# Patient Record
Sex: MALE | Race: WHITE | NOT HISPANIC OR LATINO
[De-identification: names, ages, dates, MRNs, and addresses within clinical notes are randomized per-mention and may not be internally consistent; named-entity substitution may affect disease eponyms.]

---

## 2018-09-04 ENCOUNTER — APPOINTMENT (OUTPATIENT)
Dept: NEUROLOGY | Facility: CLINIC | Age: 43
End: 2018-09-04

## 2018-09-04 VITALS
BODY MASS INDEX: 40.16 KG/M2 | SYSTOLIC BLOOD PRESSURE: 122 MMHG | DIASTOLIC BLOOD PRESSURE: 87 MMHG | WEIGHT: 265 LBS | HEART RATE: 87 BPM | HEIGHT: 68 IN

## 2018-10-23 ENCOUNTER — EMERGENCY (EMERGENCY)
Facility: HOSPITAL | Age: 43
LOS: 1 days | Discharge: HOME | End: 2018-10-23
Attending: EMERGENCY MEDICINE
Payer: MEDICARE

## 2018-10-23 VITALS
RESPIRATION RATE: 18 BRPM | WEIGHT: 235.01 LBS | HEART RATE: 75 BPM | SYSTOLIC BLOOD PRESSURE: 142 MMHG | OXYGEN SATURATION: 98 % | TEMPERATURE: 98 F | HEIGHT: 68 IN | DIASTOLIC BLOOD PRESSURE: 91 MMHG

## 2018-10-23 DIAGNOSIS — Z98.89 OTHER SPECIFIED POSTPROCEDURAL STATES: Chronic | ICD-10-CM

## 2018-10-23 DIAGNOSIS — Z96.7 PRESENCE OF OTHER BONE AND TENDON IMPLANTS: Chronic | ICD-10-CM

## 2018-10-23 LAB
ALBUMIN SERPL ELPH-MCNC: 4.5 G/DL — SIGNIFICANT CHANGE UP (ref 3.3–5)
ALP SERPL-CCNC: 86 U/L — SIGNIFICANT CHANGE UP (ref 40–120)
ALT FLD-CCNC: 27 U/L — SIGNIFICANT CHANGE UP (ref 10–45)
ANION GAP SERPL CALC-SCNC: 13 MMOL/L — SIGNIFICANT CHANGE UP (ref 5–17)
APPEARANCE UR: ABNORMAL
APTT BLD: 28.8 SEC — SIGNIFICANT CHANGE UP (ref 27.5–37.4)
AST SERPL-CCNC: 21 U/L — SIGNIFICANT CHANGE UP (ref 10–40)
BACTERIA # UR AUTO: ABNORMAL
BASOPHILS # BLD AUTO: 0 K/UL — SIGNIFICANT CHANGE UP (ref 0–0.2)
BASOPHILS NFR BLD AUTO: 0.4 % — SIGNIFICANT CHANGE UP (ref 0–2)
BILIRUB SERPL-MCNC: 0.7 MG/DL — SIGNIFICANT CHANGE UP (ref 0.2–1.2)
BILIRUB UR-MCNC: NEGATIVE — SIGNIFICANT CHANGE UP
BUN SERPL-MCNC: 15 MG/DL — SIGNIFICANT CHANGE UP (ref 7–23)
CALCIUM SERPL-MCNC: 10 MG/DL — SIGNIFICANT CHANGE UP (ref 8.4–10.5)
CHLORIDE SERPL-SCNC: 103 MMOL/L — SIGNIFICANT CHANGE UP (ref 96–108)
CO2 SERPL-SCNC: 24 MMOL/L — SIGNIFICANT CHANGE UP (ref 22–31)
COLOR SPEC: ABNORMAL
CREAT SERPL-MCNC: 1.31 MG/DL — HIGH (ref 0.5–1.3)
DIFF PNL FLD: ABNORMAL
EOSINOPHIL # BLD AUTO: 0 K/UL — SIGNIFICANT CHANGE UP (ref 0–0.5)
EOSINOPHIL NFR BLD AUTO: 0.4 % — SIGNIFICANT CHANGE UP (ref 0–6)
EPI CELLS # UR: 5 /HPF — SIGNIFICANT CHANGE UP
GLUCOSE SERPL-MCNC: 98 MG/DL — SIGNIFICANT CHANGE UP (ref 70–99)
GLUCOSE UR QL: NEGATIVE — SIGNIFICANT CHANGE UP
HCT VFR BLD CALC: 45.5 % — SIGNIFICANT CHANGE UP (ref 39–50)
HGB BLD-MCNC: 15.9 G/DL — SIGNIFICANT CHANGE UP (ref 13–17)
HYALINE CASTS # UR AUTO: 5 /LPF — HIGH (ref 0–2)
INR BLD: 1.02 RATIO — SIGNIFICANT CHANGE UP (ref 0.88–1.16)
KETONES UR-MCNC: NEGATIVE — SIGNIFICANT CHANGE UP
LEUKOCYTE ESTERASE UR-ACNC: ABNORMAL
LYMPHOCYTES # BLD AUTO: 2 K/UL — SIGNIFICANT CHANGE UP (ref 1–3.3)
LYMPHOCYTES # BLD AUTO: 24.8 % — SIGNIFICANT CHANGE UP (ref 13–44)
MCHC RBC-ENTMCNC: 29.7 PG — SIGNIFICANT CHANGE UP (ref 27–34)
MCHC RBC-ENTMCNC: 35 GM/DL — SIGNIFICANT CHANGE UP (ref 32–36)
MCV RBC AUTO: 85 FL — SIGNIFICANT CHANGE UP (ref 80–100)
MONOCYTES # BLD AUTO: 0.6 K/UL — SIGNIFICANT CHANGE UP (ref 0–0.9)
MONOCYTES NFR BLD AUTO: 7.1 % — SIGNIFICANT CHANGE UP (ref 2–14)
NEUTROPHILS # BLD AUTO: 5.5 K/UL — SIGNIFICANT CHANGE UP (ref 1.8–7.4)
NEUTROPHILS NFR BLD AUTO: 67.2 % — SIGNIFICANT CHANGE UP (ref 43–77)
NITRITE UR-MCNC: NEGATIVE — SIGNIFICANT CHANGE UP
PH UR: 5.5 — SIGNIFICANT CHANGE UP (ref 5–8)
PLATELET # BLD AUTO: 264 K/UL — SIGNIFICANT CHANGE UP (ref 150–400)
POTASSIUM SERPL-MCNC: 4 MMOL/L — SIGNIFICANT CHANGE UP (ref 3.5–5.3)
POTASSIUM SERPL-SCNC: 4 MMOL/L — SIGNIFICANT CHANGE UP (ref 3.5–5.3)
PROT SERPL-MCNC: 7.7 G/DL — SIGNIFICANT CHANGE UP (ref 6–8.3)
PROT UR-MCNC: ABNORMAL
PROTHROM AB SERPL-ACNC: 11 SEC — SIGNIFICANT CHANGE UP (ref 9.8–12.7)
RBC # BLD: 5.35 M/UL — SIGNIFICANT CHANGE UP (ref 4.2–5.8)
RBC # FLD: 13.2 % — SIGNIFICANT CHANGE UP (ref 10.3–14.5)
RBC CASTS # UR COMP ASSIST: 16 /HPF — HIGH (ref 0–4)
SODIUM SERPL-SCNC: 140 MMOL/L — SIGNIFICANT CHANGE UP (ref 135–145)
SP GR SPEC: 1.03 — HIGH (ref 1.01–1.02)
UROBILINOGEN FLD QL: NEGATIVE — SIGNIFICANT CHANGE UP
WBC # BLD: 8.1 K/UL — SIGNIFICANT CHANGE UP (ref 3.8–10.5)
WBC # FLD AUTO: 8.1 K/UL — SIGNIFICANT CHANGE UP (ref 3.8–10.5)
WBC UR QL: 11 /HPF — HIGH (ref 0–5)

## 2018-10-23 PROCEDURE — 99284 EMERGENCY DEPT VISIT MOD MDM: CPT | Mod: GC

## 2018-10-23 PROCEDURE — 74176 CT ABD & PELVIS W/O CONTRAST: CPT | Mod: 26

## 2018-10-23 RX ORDER — AZITHROMYCIN 500 MG/1
500 TABLET, FILM COATED ORAL ONCE
Qty: 0 | Refills: 0 | Status: DISCONTINUED | OUTPATIENT
Start: 2018-10-23 | End: 2018-10-23

## 2018-10-23 RX ORDER — CEFTRIAXONE 500 MG/1
1 INJECTION, POWDER, FOR SOLUTION INTRAMUSCULAR; INTRAVENOUS ONCE
Qty: 0 | Refills: 0 | Status: DISCONTINUED | OUTPATIENT
Start: 2018-10-23 | End: 2018-10-23

## 2018-10-23 RX ORDER — ACETAMINOPHEN 500 MG
1000 TABLET ORAL ONCE
Qty: 0 | Refills: 0 | Status: COMPLETED | OUTPATIENT
Start: 2018-10-23 | End: 2018-10-23

## 2018-10-23 RX ORDER — KETOROLAC TROMETHAMINE 30 MG/ML
15 SYRINGE (ML) INJECTION ONCE
Qty: 0 | Refills: 0 | Status: DISCONTINUED | OUTPATIENT
Start: 2018-10-23 | End: 2018-10-23

## 2018-10-23 RX ORDER — SODIUM CHLORIDE 9 MG/ML
1000 INJECTION INTRAMUSCULAR; INTRAVENOUS; SUBCUTANEOUS ONCE
Qty: 0 | Refills: 0 | Status: COMPLETED | OUTPATIENT
Start: 2018-10-23 | End: 2018-10-23

## 2018-10-23 RX ADMIN — Medication 15 MILLIGRAM(S): at 19:55

## 2018-10-23 RX ADMIN — SODIUM CHLORIDE 1000 MILLILITER(S): 9 INJECTION INTRAMUSCULAR; INTRAVENOUS; SUBCUTANEOUS at 19:55

## 2018-10-23 RX ADMIN — Medication 1000 MILLIGRAM(S): at 20:31

## 2018-10-23 RX ADMIN — Medication 400 MILLIGRAM(S): at 19:55

## 2018-10-23 RX ADMIN — Medication 15 MILLIGRAM(S): at 20:31

## 2018-10-23 RX ADMIN — SODIUM CHLORIDE 1000 MILLILITER(S): 9 INJECTION INTRAMUSCULAR; INTRAVENOUS; SUBCUTANEOUS at 22:04

## 2018-10-23 NOTE — ED PROVIDER NOTE - MEDICAL DECISION MAKING DETAILS
Attending Pedro Lyles DO: 42 yo male presents with right flank pain radiating to right groin x 1 day. Pain "11/10". No hx of kidney stones. No hematuria, dysuria or increased urinary frequency. No fevers. PE: Uncomfortable due to pain, right CVA tenderness, right lower quadrant ttp. A/P: likely kidney stone. Will obtain labs, fluids, analgesia, ct ab/pel, reevaluate.

## 2018-10-23 NOTE — ED PROVIDER NOTE - PLAN OF CARE
1) Please take tylenol and motrin for pain control and stay hydrated to prevent another kidney stone. Please follow-up with your primary care doctor within the next 3 days.  Please call today or tomorrow for an appointment.  If you cannot follow-up with your doctor(s), please return to the ED for any urgent issues.  2) If you have any worsening of symptoms or any other concerns please return to the ED immediately.  3) Please continue taking your home medications as directed.  4) You may have been given a copy of your labs and/or imaging.  Please go over these with your primary care doctor.

## 2018-10-23 NOTE — ED PROVIDER NOTE - PROGRESS NOTE DETAILS
NICKY Pennington MD : ct w perinephric stranding and no visualized stone - poss passed stone vs early pyelo. no overt uti on ua, cx pending. pt reassessed, states he is now havign some urinary burning. will tx w keflex - updated Dr Villarreal (family of pt). additional verbal instructions regarding diagnosis, return precautions and follow up plan given to pt and/or family. NICKY Pennington MD

## 2018-10-23 NOTE — ED PROVIDER NOTE - CARE PLAN
Principal Discharge DX:	Nephrolithiasis  Assessment and plan of treatment:	1) Please take tylenol and motrin for pain control and stay hydrated to prevent another kidney stone. Please follow-up with your primary care doctor within the next 3 days.  Please call today or tomorrow for an appointment.  If you cannot follow-up with your doctor(s), please return to the ED for any urgent issues.  2) If you have any worsening of symptoms or any other concerns please return to the ED immediately.  3) Please continue taking your home medications as directed.  4) You may have been given a copy of your labs and/or imaging.  Please go over these with your primary care doctor. Principal Discharge DX:	Nephrolithiasis  Assessment and plan of treatment:	1) Please take tylenol and motrin for pain control and stay hydrated to prevent another kidney stone. Please follow-up with your primary care doctor within the next 3 days.  Please call today or tomorrow for an appointment.  If you cannot follow-up with your doctor(s), please return to the ED for any urgent issues.  2) If you have any worsening of symptoms or any other concerns please return to the ED immediately.  3) Please continue taking your home medications as directed.  4) You may have been given a copy of your labs and/or imaging.  Please go over these with your primary care doctor.  Secondary Diagnosis:	Acute cystitis with hematuria

## 2018-10-23 NOTE — ED PROVIDER NOTE - NS ED ROS FT
Constitutional: no fevers, chills  HEENT: no visual changes, no sore throat, no rhinorrhea  CV: no cp  Resp: no sob  GI: +RLQ pain, no n/v/diarrhea/constipation  : no dysuria, hematuria  MSK: +R flank pain  skin: no rashes  neuro: no HA, no confusion

## 2018-10-23 NOTE — ED ADULT NURSE NOTE - OBJECTIVE STATEMENT
42 YO male c/o Right flank pain radiating to groin. Patient states his pain started this morning, describes the pain as sharp, 10/10, radiating to the groin area and LLQ of abdomen and had associated nausea and one episode of diarrhea. Patient took two Advil this morning with partial relief but came to the ED when pain returned. Patient denies fever or chills, denies blood in urine. Patient denies burning or pain upon urination. Positive CVA tenderness. Of note, patient states that he went to his cardiologist office and told him he had a abnormal EKG, patient does not recall what rhythm he was diagnosed with. Patient is A&OX3, no complaints of CP, HA or SOB. Airway patent, breathing unlabored. breath sounds clear and equal b/l. Abdomen soft non-tender, non-distended. Peripheral pulses positive b/l. Skin warm, dry and pink. Safety maintained, side rails up, call bell within reach.

## 2018-10-23 NOTE — ED ADULT TRIAGE NOTE - CHIEF COMPLAINT QUOTE
rt flank pain rad rt groin rad rle  nausea  Denies h/o kidney stones Denies blood in urine or diff urinating

## 2018-10-23 NOTE — ED PROVIDER NOTE - PHYSICAL EXAMINATION
Vitals: WNL  Gen: laying comfortably in NAD  Head: NCAT  ENT: sclerae white, anicterus, moist mucous membranes. No exudates. Neck supple, no LAD,  no carotid bruits, no JVD  CV: RRR. Audible S1 and S2. No murmurs, rubs, gallops, S3, nor S4, 2+ radial and DP pulses   Pulm: Clear to auscultation bilaterally. No wheezes, rales, or rhonchi  Abd: soft, normoactive BS x4, NTND, no rebound, no guarding, no rashes  Musculoskeletal:  No peripheral edema  Skin: no lesions or scars noted  Neurologic: AAOx3  : +L CVA tenderness, genital exam (chpaeroned by Milena Norman RN): no testicular tenderness, massess, no lesions/rashes, no hernias

## 2018-10-23 NOTE — ED ADULT NURSE NOTE - PSH
H/O lumbar discectomy    S/P ORIF (open reduction internal fixation) fracture  of the right ankle in Jan 2016

## 2018-10-23 NOTE — ED PROVIDER NOTE - OBJECTIVE STATEMENT
44yo M h/o chronic bl LBP pain L>R s/p fall years ago p/w R flank pain radiating to the RLQ and groin that started this AM. Pain worse with movemet, improved with using hot water on the R flank. Pain is constant. Denies dysuria, hematuria, f/c, n/v, bulges, h/o abd surgeries, h/o hernias, diarrhea/constipation, trauma to the region. Took flexeril and tylenol which didn't help.

## 2018-10-24 VITALS
DIASTOLIC BLOOD PRESSURE: 69 MMHG | OXYGEN SATURATION: 99 % | RESPIRATION RATE: 18 BRPM | SYSTOLIC BLOOD PRESSURE: 107 MMHG | HEART RATE: 66 BPM

## 2018-10-24 RX ORDER — OXYCODONE HYDROCHLORIDE 5 MG/1
1 TABLET ORAL
Qty: 0 | Refills: 0 | COMMUNITY
Start: 2018-10-24

## 2018-10-24 RX ORDER — CEPHALEXIN 500 MG
500 CAPSULE ORAL ONCE
Qty: 0 | Refills: 0 | Status: COMPLETED | OUTPATIENT
Start: 2018-10-24 | End: 2018-10-24

## 2018-10-24 RX ORDER — CEPHALEXIN 500 MG
1 CAPSULE ORAL
Qty: 14 | Refills: 0 | OUTPATIENT
Start: 2018-10-24 | End: 2018-10-30

## 2018-10-24 RX ADMIN — Medication 500 MILLIGRAM(S): at 01:32

## 2018-10-25 LAB
CULTURE RESULTS: NO GROWTH — SIGNIFICANT CHANGE UP
SPECIMEN SOURCE: SIGNIFICANT CHANGE UP

## 2018-10-26 ENCOUNTER — INPATIENT (INPATIENT)
Facility: HOSPITAL | Age: 43
LOS: 3 days | Discharge: HOME | DRG: 871 | End: 2018-10-30
Attending: INTERNAL MEDICINE | Admitting: INTERNAL MEDICINE
Payer: MEDICARE

## 2018-10-26 VITALS
OXYGEN SATURATION: 94 % | HEART RATE: 115 BPM | RESPIRATION RATE: 18 BRPM | SYSTOLIC BLOOD PRESSURE: 122 MMHG | TEMPERATURE: 103 F | DIASTOLIC BLOOD PRESSURE: 80 MMHG

## 2018-10-26 DIAGNOSIS — Z96.7 PRESENCE OF OTHER BONE AND TENDON IMPLANTS: Chronic | ICD-10-CM

## 2018-10-26 DIAGNOSIS — A41.9 SEPSIS, UNSPECIFIED ORGANISM: ICD-10-CM

## 2018-10-26 DIAGNOSIS — Z98.89 OTHER SPECIFIED POSTPROCEDURAL STATES: Chronic | ICD-10-CM

## 2018-10-26 LAB
ALBUMIN SERPL ELPH-MCNC: 3.9 G/DL — SIGNIFICANT CHANGE UP (ref 3.3–5)
ALP SERPL-CCNC: 84 U/L — SIGNIFICANT CHANGE UP (ref 40–120)
ALT FLD-CCNC: 24 U/L — SIGNIFICANT CHANGE UP (ref 10–45)
ANION GAP SERPL CALC-SCNC: 15 MMOL/L — SIGNIFICANT CHANGE UP (ref 5–17)
APPEARANCE UR: ABNORMAL
APTT BLD: 27.3 SEC — LOW (ref 27.5–37.4)
AST SERPL-CCNC: 21 U/L — SIGNIFICANT CHANGE UP (ref 10–40)
BACTERIA # UR AUTO: NEGATIVE — SIGNIFICANT CHANGE UP
BASE EXCESS BLDV CALC-SCNC: 0.2 MMOL/L — SIGNIFICANT CHANGE UP (ref -2–2)
BASOPHILS # BLD AUTO: 0 K/UL — SIGNIFICANT CHANGE UP (ref 0–0.2)
BASOPHILS NFR BLD AUTO: 0.1 % — SIGNIFICANT CHANGE UP (ref 0–2)
BILIRUB SERPL-MCNC: 1.6 MG/DL — HIGH (ref 0.2–1.2)
BILIRUB UR-MCNC: NEGATIVE — SIGNIFICANT CHANGE UP
BUN SERPL-MCNC: 8 MG/DL — SIGNIFICANT CHANGE UP (ref 7–23)
CA-I SERPL-SCNC: 1.11 MMOL/L — LOW (ref 1.12–1.3)
CALCIUM SERPL-MCNC: 9.3 MG/DL — SIGNIFICANT CHANGE UP (ref 8.4–10.5)
CHLORIDE BLDV-SCNC: 110 MMOL/L — HIGH (ref 96–108)
CHLORIDE SERPL-SCNC: 103 MMOL/L — SIGNIFICANT CHANGE UP (ref 96–108)
CO2 BLDV-SCNC: 24 MMOL/L — SIGNIFICANT CHANGE UP (ref 22–30)
CO2 SERPL-SCNC: 19 MMOL/L — LOW (ref 22–31)
COLOR SPEC: YELLOW — SIGNIFICANT CHANGE UP
CREAT SERPL-MCNC: 0.96 MG/DL — SIGNIFICANT CHANGE UP (ref 0.5–1.3)
DIFF PNL FLD: ABNORMAL
EOSINOPHIL # BLD AUTO: 0 K/UL — SIGNIFICANT CHANGE UP (ref 0–0.5)
EOSINOPHIL NFR BLD AUTO: 0.2 % — SIGNIFICANT CHANGE UP (ref 0–6)
EPI CELLS # UR: 11 /HPF — HIGH
GAS PNL BLDV: 131 MMOL/L — LOW (ref 136–145)
GAS PNL BLDV: SIGNIFICANT CHANGE UP
GLUCOSE BLDV-MCNC: 106 MG/DL — HIGH (ref 70–99)
GLUCOSE SERPL-MCNC: 108 MG/DL — HIGH (ref 70–99)
GLUCOSE UR QL: NEGATIVE — SIGNIFICANT CHANGE UP
HCO3 BLDV-SCNC: 23 MMOL/L — SIGNIFICANT CHANGE UP (ref 21–29)
HCT VFR BLD CALC: 43.3 % — SIGNIFICANT CHANGE UP (ref 39–50)
HCT VFR BLDA CALC: 47 % — SIGNIFICANT CHANGE UP (ref 39–50)
HGB BLD CALC-MCNC: 15.3 G/DL — SIGNIFICANT CHANGE UP (ref 13–17)
HGB BLD-MCNC: 15.3 G/DL — SIGNIFICANT CHANGE UP (ref 13–17)
HYALINE CASTS # UR AUTO: 5 /LPF — HIGH (ref 0–2)
INR BLD: 1.11 RATIO — SIGNIFICANT CHANGE UP (ref 0.88–1.16)
INR BLD: 1.26 RATIO — HIGH (ref 0.88–1.16)
KETONES UR-MCNC: NEGATIVE — SIGNIFICANT CHANGE UP
LACTATE BLDV-MCNC: 2.2 MMOL/L — HIGH (ref 0.7–2)
LACTATE BLDV-MCNC: 2.5 MMOL/L — HIGH (ref 0.7–2)
LEUKOCYTE ESTERASE UR-ACNC: NEGATIVE — SIGNIFICANT CHANGE UP
LYMPHOCYTES # BLD AUTO: 0.9 K/UL — LOW (ref 1–3.3)
LYMPHOCYTES # BLD AUTO: 8.7 % — LOW (ref 13–44)
MCHC RBC-ENTMCNC: 29.7 PG — SIGNIFICANT CHANGE UP (ref 27–34)
MCHC RBC-ENTMCNC: 35.3 GM/DL — SIGNIFICANT CHANGE UP (ref 32–36)
MCV RBC AUTO: 84 FL — SIGNIFICANT CHANGE UP (ref 80–100)
MONOCYTES # BLD AUTO: 0.7 K/UL — SIGNIFICANT CHANGE UP (ref 0–0.9)
MONOCYTES NFR BLD AUTO: 7.3 % — SIGNIFICANT CHANGE UP (ref 2–14)
NEUTROPHILS # BLD AUTO: 8.4 K/UL — HIGH (ref 1.8–7.4)
NEUTROPHILS NFR BLD AUTO: 83.7 % — HIGH (ref 43–77)
NITRITE UR-MCNC: NEGATIVE — SIGNIFICANT CHANGE UP
PCO2 BLDV: 33 MMHG — LOW (ref 35–50)
PH BLDV: 7.46 — HIGH (ref 7.35–7.45)
PH UR: 6 — SIGNIFICANT CHANGE UP (ref 5–8)
PLATELET # BLD AUTO: 193 K/UL — SIGNIFICANT CHANGE UP (ref 150–400)
PO2 BLDV: 54 MMHG — HIGH (ref 25–45)
POTASSIUM BLDV-SCNC: 3.4 MMOL/L — LOW (ref 3.5–5.3)
POTASSIUM SERPL-MCNC: 3.7 MMOL/L — SIGNIFICANT CHANGE UP (ref 3.5–5.3)
POTASSIUM SERPL-SCNC: 3.7 MMOL/L — SIGNIFICANT CHANGE UP (ref 3.5–5.3)
PROT SERPL-MCNC: 7.3 G/DL — SIGNIFICANT CHANGE UP (ref 6–8.3)
PROT UR-MCNC: ABNORMAL
PROTHROM AB SERPL-ACNC: 12.1 SEC — SIGNIFICANT CHANGE UP (ref 9.8–12.7)
PROTHROM AB SERPL-ACNC: 13.8 SEC — HIGH (ref 9.8–12.7)
RAPID RVP RESULT: SIGNIFICANT CHANGE UP
RBC # BLD: 5.15 M/UL — SIGNIFICANT CHANGE UP (ref 4.2–5.8)
RBC # FLD: 12.9 % — SIGNIFICANT CHANGE UP (ref 10.3–14.5)
RBC CASTS # UR COMP ASSIST: 20 /HPF — HIGH (ref 0–4)
SAO2 % BLDV: 88 % — SIGNIFICANT CHANGE UP (ref 67–88)
SODIUM SERPL-SCNC: 137 MMOL/L — SIGNIFICANT CHANGE UP (ref 135–145)
SP GR SPEC: 1.02 — SIGNIFICANT CHANGE UP (ref 1.01–1.02)
URATE CRY FLD QL MICRO: ABNORMAL
UROBILINOGEN FLD QL: NEGATIVE — SIGNIFICANT CHANGE UP
WBC # BLD: 10.1 K/UL — SIGNIFICANT CHANGE UP (ref 3.8–10.5)
WBC # FLD AUTO: 10.1 K/UL — SIGNIFICANT CHANGE UP (ref 3.8–10.5)
WBC UR QL: 9 /HPF — HIGH (ref 0–5)

## 2018-10-26 PROCEDURE — 93010 ELECTROCARDIOGRAM REPORT: CPT

## 2018-10-26 PROCEDURE — 99232 SBSQ HOSP IP/OBS MODERATE 35: CPT

## 2018-10-26 PROCEDURE — 99285 EMERGENCY DEPT VISIT HI MDM: CPT | Mod: GC,25

## 2018-10-26 PROCEDURE — 71045 X-RAY EXAM CHEST 1 VIEW: CPT | Mod: 26

## 2018-10-26 RX ORDER — SODIUM CHLORIDE 9 MG/ML
1000 INJECTION, SOLUTION INTRAVENOUS
Qty: 0 | Refills: 0 | Status: DISCONTINUED | OUTPATIENT
Start: 2018-10-26 | End: 2018-10-28

## 2018-10-26 RX ORDER — ZOLPIDEM TARTRATE 10 MG/1
5 TABLET ORAL AT BEDTIME
Qty: 0 | Refills: 0 | Status: DISCONTINUED | OUTPATIENT
Start: 2018-10-26 | End: 2018-10-26

## 2018-10-26 RX ORDER — KETOROLAC TROMETHAMINE 30 MG/ML
15 SYRINGE (ML) INJECTION ONCE
Qty: 0 | Refills: 0 | Status: DISCONTINUED | OUTPATIENT
Start: 2018-10-26 | End: 2018-10-27

## 2018-10-26 RX ORDER — ACETAMINOPHEN 500 MG
1000 TABLET ORAL ONCE
Qty: 0 | Refills: 0 | Status: COMPLETED | OUTPATIENT
Start: 2018-10-26 | End: 2018-10-26

## 2018-10-26 RX ORDER — SODIUM CHLORIDE 9 MG/ML
2000 INJECTION, SOLUTION INTRAVENOUS ONCE
Qty: 0 | Refills: 0 | Status: COMPLETED | OUTPATIENT
Start: 2018-10-26 | End: 2018-10-26

## 2018-10-26 RX ORDER — PIPERACILLIN AND TAZOBACTAM 4; .5 G/20ML; G/20ML
3.38 INJECTION, POWDER, LYOPHILIZED, FOR SOLUTION INTRAVENOUS EVERY 8 HOURS
Qty: 0 | Refills: 0 | Status: DISCONTINUED | OUTPATIENT
Start: 2018-10-26 | End: 2018-10-30

## 2018-10-26 RX ORDER — ONDANSETRON 8 MG/1
4 TABLET, FILM COATED ORAL ONCE
Qty: 0 | Refills: 0 | Status: COMPLETED | OUTPATIENT
Start: 2018-10-26 | End: 2018-10-26

## 2018-10-26 RX ORDER — CEFTRIAXONE 500 MG/1
1 INJECTION, POWDER, FOR SOLUTION INTRAMUSCULAR; INTRAVENOUS ONCE
Qty: 0 | Refills: 0 | Status: DISCONTINUED | OUTPATIENT
Start: 2018-10-26 | End: 2018-10-26

## 2018-10-26 RX ORDER — PIPERACILLIN AND TAZOBACTAM 4; .5 G/20ML; G/20ML
3.38 INJECTION, POWDER, LYOPHILIZED, FOR SOLUTION INTRAVENOUS ONCE
Qty: 0 | Refills: 0 | Status: COMPLETED | OUTPATIENT
Start: 2018-10-26 | End: 2018-10-26

## 2018-10-26 RX ORDER — HEPARIN SODIUM 5000 [USP'U]/ML
5000 INJECTION INTRAVENOUS; SUBCUTANEOUS EVERY 12 HOURS
Qty: 0 | Refills: 0 | Status: DISCONTINUED | OUTPATIENT
Start: 2018-10-26 | End: 2018-10-30

## 2018-10-26 RX ORDER — ACETAMINOPHEN 500 MG
650 TABLET ORAL EVERY 6 HOURS
Qty: 0 | Refills: 0 | Status: DISCONTINUED | OUTPATIENT
Start: 2018-10-26 | End: 2018-10-30

## 2018-10-26 RX ORDER — KETOROLAC TROMETHAMINE 30 MG/ML
15 SYRINGE (ML) INJECTION ONCE
Qty: 0 | Refills: 0 | Status: DISCONTINUED | OUTPATIENT
Start: 2018-10-26 | End: 2018-10-26

## 2018-10-26 RX ADMIN — Medication 15 MILLIGRAM(S): at 20:17

## 2018-10-26 RX ADMIN — ONDANSETRON 4 MILLIGRAM(S): 8 TABLET, FILM COATED ORAL at 17:43

## 2018-10-26 RX ADMIN — Medication 15 MILLIGRAM(S): at 19:47

## 2018-10-26 RX ADMIN — Medication 650 MILLIGRAM(S): at 22:44

## 2018-10-26 RX ADMIN — PIPERACILLIN AND TAZOBACTAM 25 GRAM(S): 4; .5 INJECTION, POWDER, LYOPHILIZED, FOR SOLUTION INTRAVENOUS at 22:14

## 2018-10-26 RX ADMIN — Medication 650 MILLIGRAM(S): at 22:14

## 2018-10-26 RX ADMIN — SODIUM CHLORIDE 100 MILLILITER(S): 9 INJECTION, SOLUTION INTRAVENOUS at 20:55

## 2018-10-26 RX ADMIN — Medication 400 MILLIGRAM(S): at 17:36

## 2018-10-26 RX ADMIN — SODIUM CHLORIDE 2000 MILLILITER(S): 9 INJECTION, SOLUTION INTRAVENOUS at 17:36

## 2018-10-26 RX ADMIN — PIPERACILLIN AND TAZOBACTAM 200 GRAM(S): 4; .5 INJECTION, POWDER, LYOPHILIZED, FOR SOLUTION INTRAVENOUS at 17:36

## 2018-10-26 NOTE — ED PROVIDER NOTE - ENMT, MLM
Airway patent, Nasal mucosa clear. Mouth with slightly erythematous mucosa. Throat has no vesicles, no oropharyngeal exudates and uvula is midline.

## 2018-10-26 NOTE — ED PROVIDER NOTE - PROGRESS NOTE DETAILS
At this time a FOCUSSED SEPSIS REASSESSMENT EXAM was performed. The patient generally appears improved. Vital signs were reviewed and are notable for improved heart rate and appropriate blood pressure. Cardiac and pulmonary auscultation was performed and the exam is unchanged. The patient shows no increased work of breathing. The skin exam is notable for good color and turgor, no incrased edema, and is warm to the touch. Distal capillary refill is <2 seconds. Mental status is appropriate. The patient appears to be responding appropriately to the current plan.

## 2018-10-26 NOTE — H&P ADULT - NSHPREVIEWOFSYSTEMS_GEN_ALL_CORE
REVIEW OF SYSTEMS:  GEN: fever,   / chills  RESP: no SOB,   no cough  CVS: no chest pain,   no palpitations  GI: abdominal pain,   no nausea,   no vomiting,   no constipation,   no diarrhea  : no dysuria,   no frequency  NEURO: no headache,   no dizziness  PSYCH: no depression,   not anxious  Derm : no rash

## 2018-10-26 NOTE — CHART NOTE - NSCHARTNOTEFT_GEN_A_CORE
Notified by RN for fever of 103. Pt seen and examined at bedside. Pt endorses chills and flank pain.  Denies any chest pain, palpitations, SOB, headache or urinary symptoms.     Vital Signs Last 24 Hrs  T(C): 39.4 (26 Oct 2018 22:13), Max: 39.4 (26 Oct 2018 16:24)  T(F): 103 (26 Oct 2018 22:13), Max: 103 (26 Oct 2018 22:13)  HR: 96 (26 Oct 2018 21:25) (95 - 115)  BP: 100/69 (26 Oct 2018 21:25) (100/69 - 122/80)  BP(mean): --  RR: 18 (26 Oct 2018 21:25) (18 - 20)  SpO2: 95% (26 Oct 2018 21:25) (94% - 96%)    PHYSICAL EXAM:   General: NAD, non-toxic appearance + chills  Neuro: NC, AT, PERRLA, no focal deficits  CV: S1 S2 RRR  Resp: B/L Lungs CTA, nonlabored  Abd: soft, NT, ND, + BS X4 quadrants  Ext: no edema, +PP b/l LE, warm to touch                           15.3   10.1  )-----------( 193      ( 26 Oct 2018 17:44 )             43.3     10-26    137  |  103  |  8   ----------------------------<  108<H>  3.7   |  19<L>  |  0.96    Ca    9.3      26 Oct 2018 17:44    TPro  7.3  /  Alb  3.9  /  TBili  1.6<H>  /  DBili  x   /  AST  21  /  ALT  24  /  AlkPhos  84  10-26    Blood Gas Venous - Lactate (10.26.18 @ 19:43)    Blood Gas Venous - Lactate: 2.5 mmoL/L    .Urine Clean Catch (Midstream)  10-24-18   No growth  --  --    Urinalysis (10.26.18 @ 18:31)    pH Urine: 6.0    Glucose Qualitative, Urine: Negative    Blood, Urine: Small    Color: Yellow    Urine Appearance: Slightly Turbid    Bilirubin: Negative    Ketone - Urine: Negative    Specific Gravity: 1.024    Protein, Urine: 30 mg/dL    Urobilinogen: Negative    Nitrite: Negative    Leukocyte Esterase Concentration: Negative    Rapid Respiratory Viral Panel (10.26.18 @ 17:44)    Rapid RVP Result: NotDete: The FilmArray RVP Rapid uses polymerase chain reaction (PCR) and melt  curve analysis to screen for adenovirus; coronavirus HKU1, NL63, 229E,  OC43; human metapneumovirus (hMPV); human enterovirus/rhinovirus  (Entero/RV); influenza A; influenza A/H1;influenza A/H3; influenza  A/H1-2009; influenza B; parainfluenza viruses 1, 2, 3, 4; respiratory  syncytial virus; Bordetella pertussis; Mycoplasma pneumoniae; and  Chlamydophila pneumoniae.    < from: Xray Chest 1 View- PORTABLE-Urgent (10.26.18 @ 19:18) >    INTERPRETATION:  low lung volumes. hazy opacity in the left lower lung   may be secondary to atelectasis vs pneumonia.    < end of copied text >    < from: CT Abdomen and Pelvis No Cont (10.23.18 @ 21:46) >    IMPRESSION:     Minimal right perinephric stranding, which may be due to a recently   passed stone and/or urinary tract infection. No hydroureteronephrosis or   obstructing radiopaque urinary tract stone. Punctate nonobstructing right   renal stone.    < end of copied text >    Assessment & Plan   42yo M who recently came in with   a  R kidney stone,    p/w fevers and malaise for 2 days . After the patient was seen in the ED  and  sent home  with keflex,  2 days  ago, , home he was unable to sleep, eat food 2/2 nausea and continued to feel worse. His flank pain resolved by 2 days PTA, but it has recently started back up again.  He has been taking his abx as prescribed without much improvement and states that when he takes ibuprofen his fever abates, but returns within 2 hours.  He denies any diarrhea, constipation, vomiting, burning with urination. He endorses a productive cough, but denies nasal dripping or sore throat.	  now  in er.  with  chills/ malaise/  ill looking (26 Oct 2018 17:38)    Pt acutely presenting with fever of 103  - Continue with IV Zosyn  - Continue IVF for hydration   - Tylenol PRN   - Cooling measures; pt refusing hypothermia blanket or ice packs.   - F/u blood cx x2 and urine cx sent in ER  - Will continue to monitor  F/U with primary team in am    Tamanna KILLIAN EPISODIC PA NOTE     CC: TEMP     Notified by RN for fever of 103. Pt seen and examined at bedside. Pt endorses chills and flank pain.  Denies any chest pain, palpitations, SOB, headache or urinary symptoms.     Vital Signs Last 24 Hrs  T(C): 39.4 (26 Oct 2018 22:13), Max: 39.4 (26 Oct 2018 16:24)  T(F): 103 (26 Oct 2018 22:13), Max: 103 (26 Oct 2018 22:13)  HR: 96 (26 Oct 2018 21:25) (95 - 115)  BP: 100/69 (26 Oct 2018 21:25) (100/69 - 122/80)  BP(mean): --  RR: 18 (26 Oct 2018 21:25) (18 - 20)  SpO2: 95% (26 Oct 2018 21:25) (94% - 96%)    PHYSICAL EXAM:   General: NAD, non-toxic appearance + chills  Neuro: NC, AT, PERRLA, no focal deficits  CV: S1 S2 RRR  Resp: B/L Lungs CTA, nonlabored  Abd: soft, NT, ND, + BS X4 quadrants  Ext: no edema, +PP b/l LE, warm to touch                           15.3   10.1  )-----------( 193      ( 26 Oct 2018 17:44 )             43.3     10-26    137  |  103  |  8   ----------------------------<  108<H>  3.7   |  19<L>  |  0.96    Ca    9.3      26 Oct 2018 17:44    TPro  7.3  /  Alb  3.9  /  TBili  1.6<H>  /  DBili  x   /  AST  21  /  ALT  24  /  AlkPhos  84  10-26    Blood Gas Venous - Lactate (10.26.18 @ 19:43)    Blood Gas Venous - Lactate: 2.5 mmoL/L    .Urine Clean Catch (Midstream)  10-24-18   No growth  --  --    Urinalysis (10.26.18 @ 18:31)    pH Urine: 6.0    Glucose Qualitative, Urine: Negative    Blood, Urine: Small    Color: Yellow    Urine Appearance: Slightly Turbid    Bilirubin: Negative    Ketone - Urine: Negative    Specific Gravity: 1.024    Protein, Urine: 30 mg/dL    Urobilinogen: Negative    Nitrite: Negative    Leukocyte Esterase Concentration: Negative    Rapid Respiratory Viral Panel (10.26.18 @ 17:44)    Rapid RVP Result: NotDete: The FilmArray RVP Rapid uses polymerase chain reaction (PCR) and melt  curve analysis to screen for adenovirus; coronavirus HKU1, NL63, 229E,  OC43; human metapneumovirus (hMPV); human enterovirus/rhinovirus  (Entero/RV); influenza A; influenza A/H1;influenza A/H3; influenza  A/H1-2009; influenza B; parainfluenza viruses 1, 2, 3, 4; respiratory  syncytial virus; Bordetella pertussis; Mycoplasma pneumoniae; and  Chlamydophila pneumoniae.    < from: Xray Chest 1 View- PORTABLE-Urgent (10.26.18 @ 19:18) >    INTERPRETATION:  low lung volumes. hazy opacity in the left lower lung   may be secondary to atelectasis vs pneumonia.    < end of copied text >    < from: CT Abdomen and Pelvis No Cont (10.23.18 @ 21:46) >    IMPRESSION:     Minimal right perinephric stranding, which may be due to a recently   passed stone and/or urinary tract infection. No hydroureteronephrosis or   obstructing radiopaque urinary tract stone. Punctate nonobstructing right   renal stone.    < end of copied text >    Assessment & Plan   42yo M who recently came in with   a  R kidney stone,    p/w fevers and malaise for 2 days . After the patient was seen in the ED  and  sent home  with keflex,  2 days  ago, , home he was unable to sleep, eat food 2/2 nausea and continued to feel worse. His flank pain resolved by 2 days PTA, but it has recently started back up again.  He has been taking his abx as prescribed without much improvement and states that when he takes ibuprofen his fever abates, but returns within 2 hours.  He denies any diarrhea, constipation, vomiting, burning with urination. He endorses a productive cough, but denies nasal dripping or sore throat.	  now  in er.  with  chills/ malaise/  ill looking (26 Oct 2018 17:38)    Pt acutely presenting with fever of 103  - Continue with IV Zosyn  - Continue IVF for hydration   - Tylenol PRN   - Cooling measures; pt refusing hypothermia blanket or ice packs.   - F/u blood cx x2 and urine cx sent in ER  - Will continue to monitor  F/U with primary team in am    Tamanna KILLIAN  #[id number] EPISODIC PA NOTE     CC: TEMP     Notified by RN for fever of 103. Pt seen and examined at bedside. Pt endorses chills and flank pain.  Denies any chest pain, palpitations, SOB, headache or urinary symptoms.     Vital Signs Last 24 Hrs  T(C): 39.4 (26 Oct 2018 22:13), Max: 39.4 (26 Oct 2018 16:24)  T(F): 103 (26 Oct 2018 22:13), Max: 103 (26 Oct 2018 22:13)  HR: 96 (26 Oct 2018 21:25) (95 - 115)  BP: 100/69 (26 Oct 2018 21:25) (100/69 - 122/80)  BP(mean): --  RR: 18 (26 Oct 2018 21:25) (18 - 20)  SpO2: 95% (26 Oct 2018 21:25) (94% - 96%)    PHYSICAL EXAM:   General: NAD, non-toxic appearance + chills  Neuro: NC, AT, PERRLA, no focal deficits  CV: S1 S2 RRR  Resp: B/L Lungs CTA, nonlabored  Abd: soft, NT, ND, + BS X4 quadrants  Ext: no edema, +PP b/l LE, warm to touch                           15.3   10.1  )-----------( 193      ( 26 Oct 2018 17:44 )             43.3     10-26    137  |  103  |  8   ----------------------------<  108<H>  3.7   |  19<L>  |  0.96    Ca    9.3      26 Oct 2018 17:44    TPro  7.3  /  Alb  3.9  /  TBili  1.6<H>  /  DBili  x   /  AST  21  /  ALT  24  /  AlkPhos  84  10-26    Blood Gas Venous - Lactate (10.26.18 @ 19:43)    Blood Gas Venous - Lactate: 2.5 mmoL/L    .Urine Clean Catch (Midstream)  10-24-18   No growth  --  --    Urinalysis (10.26.18 @ 18:31)    pH Urine: 6.0    Glucose Qualitative, Urine: Negative    Blood, Urine: Small    Color: Yellow    Urine Appearance: Slightly Turbid    Bilirubin: Negative    Ketone - Urine: Negative    Specific Gravity: 1.024    Protein, Urine: 30 mg/dL    Urobilinogen: Negative    Nitrite: Negative    Leukocyte Esterase Concentration: Negative    Rapid Respiratory Viral Panel (10.26.18 @ 17:44)    Rapid RVP Result: NotDete: The FilmArray RVP Rapid uses polymerase chain reaction (PCR) and melt  curve analysis to screen for adenovirus; coronavirus HKU1, NL63, 229E,  OC43; human metapneumovirus (hMPV); human enterovirus/rhinovirus  (Entero/RV); influenza A; influenza A/H1;influenza A/H3; influenza  A/H1-2009; influenza B; parainfluenza viruses 1, 2, 3, 4; respiratory  syncytial virus; Bordetella pertussis; Mycoplasma pneumoniae; and  Chlamydophila pneumoniae.    < from: Xray Chest 1 View- PORTABLE-Urgent (10.26.18 @ 19:18) >    INTERPRETATION:  low lung volumes. hazy opacity in the left lower lung   may be secondary to atelectasis vs pneumonia.    < end of copied text >    < from: CT Abdomen and Pelvis No Cont (10.23.18 @ 21:46) >    IMPRESSION:     Minimal right perinephric stranding, which may be due to a recently   passed stone and/or urinary tract infection. No hydroureteronephrosis or   obstructing radiopaque urinary tract stone. Punctate nonobstructing right   renal stone.    < end of copied text >    Assessment & Plan   42yo M who recently came in with   a  R kidney stone,    p/w fevers and malaise for 2 days . After the patient was seen in the ED  and  sent home  with keflex,  2 days  ago, , home he was unable to sleep, eat food 2/2 nausea and continued to feel worse. His flank pain resolved by 2 days PTA, but it has recently started back up again.  He has been taking his abx as prescribed without much improvement and states that when he takes ibuprofen his fever abates, but returns within 2 hours.  He denies any diarrhea, constipation, vomiting, burning with urination. He endorses a productive cough, but denies nasal dripping or sore throat.	  now  in er.  with  chills/ malaise/  ill looking (26 Oct 2018 17:38)    Pt acutely presenting with fever of 103  - Continue with IV Zosyn  - Continue IVF for hydration   - Tylenol PRN   - Cooling measures; pt refusing hypothermia blanket or ice packs.   - F/u blood cx x2 and urine cx sent in ER  - Will continue to monitor  F/U with primary team in am    Tamanna KILLIAN  #[id number]    ADDENDUM  Repeat temp @ 12:03 of 103. Pt feels better; refusing hypothermia blanket. Creatinine, Serum: 0.96 mg/dL (10.26.18 @ 17:44); Motrin 400 mg x 1. Will continue to monitor.     Tamanna HONG  #[id number] EPISODIC PA NOTE     CC: TEMP     Notified by RN for fever of 103. Pt seen and examined at bedside. Pt endorses chills and flank pain.  Denies any chest pain, palpitations, SOB, headache or urinary symptoms.     Vital Signs Last 24 Hrs  T(C): 39.4 (26 Oct 2018 22:13), Max: 39.4 (26 Oct 2018 16:24)  T(F): 103 (26 Oct 2018 22:13), Max: 103 (26 Oct 2018 22:13)  HR: 96 (26 Oct 2018 21:25) (95 - 115)  BP: 100/69 (26 Oct 2018 21:25) (100/69 - 122/80)  BP(mean): --  RR: 18 (26 Oct 2018 21:25) (18 - 20)  SpO2: 95% (26 Oct 2018 21:25) (94% - 96%)    PHYSICAL EXAM:   General: NAD, non-toxic appearance + chills  Neuro: NC, AT, PERRLA, no focal deficits  CV: S1 S2 RRR  Resp: B/L Lungs CTA, nonlabored  Abd: soft, NT, ND, + BS X4 quadrants  Ext: no edema, +PP b/l LE, warm to touch                           15.3   10.1  )-----------( 193      ( 26 Oct 2018 17:44 )             43.3     10-26    137  |  103  |  8   ----------------------------<  108<H>  3.7   |  19<L>  |  0.96    Ca    9.3      26 Oct 2018 17:44    TPro  7.3  /  Alb  3.9  /  TBili  1.6<H>  /  DBili  x   /  AST  21  /  ALT  24  /  AlkPhos  84  10-26    Blood Gas Venous - Lactate (10.26.18 @ 19:43)    Blood Gas Venous - Lactate: 2.5 mmoL/L    .Urine Clean Catch (Midstream)  10-24-18   No growth  --  --    Urinalysis (10.26.18 @ 18:31)    pH Urine: 6.0    Glucose Qualitative, Urine: Negative    Blood, Urine: Small    Color: Yellow    Urine Appearance: Slightly Turbid    Bilirubin: Negative    Ketone - Urine: Negative    Specific Gravity: 1.024    Protein, Urine: 30 mg/dL    Urobilinogen: Negative    Nitrite: Negative    Leukocyte Esterase Concentration: Negative    Rapid Respiratory Viral Panel (10.26.18 @ 17:44)    Rapid RVP Result: NotDete: The FilmArray RVP Rapid uses polymerase chain reaction (PCR) and melt  curve analysis to screen for adenovirus; coronavirus HKU1, NL63, 229E,  OC43; human metapneumovirus (hMPV); human enterovirus/rhinovirus  (Entero/RV); influenza A; influenza A/H1;influenza A/H3; influenza  A/H1-2009; influenza B; parainfluenza viruses 1, 2, 3, 4; respiratory  syncytial virus; Bordetella pertussis; Mycoplasma pneumoniae; and  Chlamydophila pneumoniae.    < from: Xray Chest 1 View- PORTABLE-Urgent (10.26.18 @ 19:18) >    INTERPRETATION:  low lung volumes. hazy opacity in the left lower lung   may be secondary to atelectasis vs pneumonia.    < end of copied text >    < from: CT Abdomen and Pelvis No Cont (10.23.18 @ 21:46) >    IMPRESSION:     Minimal right perinephric stranding, which may be due to a recently   passed stone and/or urinary tract infection. No hydroureteronephrosis or   obstructing radiopaque urinary tract stone. Punctate nonobstructing right   renal stone.    < end of copied text >    Assessment & Plan   44yo M who recently came in with   a  R kidney stone,    p/w fevers and malaise for 2 days . After the patient was seen in the ED  and  sent home  with keflex,  2 days  ago, , home he was unable to sleep, eat food 2/2 nausea and continued to feel worse. His flank pain resolved by 2 days PTA, but it has recently started back up again.  He has been taking his abx as prescribed without much improvement and states that when he takes ibuprofen his fever abates, but returns within 2 hours.  He denies any diarrhea, constipation, vomiting, burning with urination. He endorses a productive cough, but denies nasal dripping or sore throat.	  now  in er.  with  chills/ malaise/  ill looking (26 Oct 2018 17:38)    Pt acutely presenting with fever of 103  - Continue with IV Zosyn  - Continue IVF for hydration   - Tylenol PRN   - Cooling measures; pt refusing hypothermia blanket or ice packs.   - F/u blood cx x2 and urine cx sent in ER  - Will continue to monitor  F/U with primary team in am    Tamanna KILLIAN  #[id number]    ADDENDUM  Repeat temp @ 12:03 of 103. Pt feels better; refusing hypothermia blanket. Creatinine, Serum: 0.96 mg/dL (10.26.18 @ 17:44); Motrin 400 mg x 1. Will continue to monitor.     Tamanna HONG  #[id number]    ADDENDUM  Pt hypotensive with SBP of 95 1:40 am. Pt asymptomatic; resting comfortably.  cc over 1hr and will re assess BP. CXR: low lung volumes. hazy opacity in the left lower lung   may be secondary to atelectasis vs pneumonia. Pt with recent visit to hospital ; Vancomycin x 1 and will continue Zosyn. Will continue to monitor.     Tamanna HONG  #[id number] EPISODIC PA NOTE     CC: TEMP     Notified by RN for fever of 103. Pt seen and examined at bedside. Pt endorses chills and flank pain.  Denies any chest pain, palpitations, SOB, headache or urinary symptoms.     Vital Signs Last 24 Hrs  T(C): 39.4 (26 Oct 2018 22:13), Max: 39.4 (26 Oct 2018 16:24)  T(F): 103 (26 Oct 2018 22:13), Max: 103 (26 Oct 2018 22:13)  HR: 96 (26 Oct 2018 21:25) (95 - 115)  BP: 100/69 (26 Oct 2018 21:25) (100/69 - 122/80)  BP(mean): --  RR: 18 (26 Oct 2018 21:25) (18 - 20)  SpO2: 95% (26 Oct 2018 21:25) (94% - 96%)    PHYSICAL EXAM:   General: NAD, non-toxic appearance + chills  Neuro: NC, AT, PERRLA, no focal deficits  CV: S1 S2 RRR  Resp: B/L Lungs CTA, nonlabored  Abd: soft, NT, ND, + BS X4 quadrants  Ext: no edema, +PP b/l LE, warm to touch                           15.3   10.1  )-----------( 193      ( 26 Oct 2018 17:44 )             43.3     10-26    137  |  103  |  8   ----------------------------<  108<H>  3.7   |  19<L>  |  0.96    Ca    9.3      26 Oct 2018 17:44    TPro  7.3  /  Alb  3.9  /  TBili  1.6<H>  /  DBili  x   /  AST  21  /  ALT  24  /  AlkPhos  84  10-26    Blood Gas Venous - Lactate (10.26.18 @ 19:43)    Blood Gas Venous - Lactate: 2.5 mmoL/L    .Urine Clean Catch (Midstream)  10-24-18   No growth  --  --    Urinalysis (10.26.18 @ 18:31)    pH Urine: 6.0    Glucose Qualitative, Urine: Negative    Blood, Urine: Small    Color: Yellow    Urine Appearance: Slightly Turbid    Bilirubin: Negative    Ketone - Urine: Negative    Specific Gravity: 1.024    Protein, Urine: 30 mg/dL    Urobilinogen: Negative    Nitrite: Negative    Leukocyte Esterase Concentration: Negative    Rapid Respiratory Viral Panel (10.26.18 @ 17:44)    Rapid RVP Result: NotDete: The FilmArray RVP Rapid uses polymerase chain reaction (PCR) and melt  curve analysis to screen for adenovirus; coronavirus HKU1, NL63, 229E,  OC43; human metapneumovirus (hMPV); human enterovirus/rhinovirus  (Entero/RV); influenza A; influenza A/H1;influenza A/H3; influenza  A/H1-2009; influenza B; parainfluenza viruses 1, 2, 3, 4; respiratory  syncytial virus; Bordetella pertussis; Mycoplasma pneumoniae; and  Chlamydophila pneumoniae.    < from: Xray Chest 1 View- PORTABLE-Urgent (10.26.18 @ 19:18) >    INTERPRETATION:  low lung volumes. hazy opacity in the left lower lung   may be secondary to atelectasis vs pneumonia.    < end of copied text >    < from: CT Abdomen and Pelvis No Cont (10.23.18 @ 21:46) >    IMPRESSION:     Minimal right perinephric stranding, which may be due to a recently   passed stone and/or urinary tract infection. No hydroureteronephrosis or   obstructing radiopaque urinary tract stone. Punctate nonobstructing right   renal stone.    < end of copied text >    Assessment & Plan   42yo M who recently came in with   a  R kidney stone,    p/w fevers and malaise for 2 days . After the patient was seen in the ED  and  sent home  with keflex,  2 days  ago, , home he was unable to sleep, eat food 2/2 nausea and continued to feel worse. His flank pain resolved by 2 days PTA, but it has recently started back up again.  He has been taking his abx as prescribed without much improvement and states that when he takes ibuprofen his fever abates, but returns within 2 hours.  He denies any diarrhea, constipation, vomiting, burning with urination. He endorses a productive cough, but denies nasal dripping or sore throat.	  now  in er.  with  chills/ malaise/  ill looking (26 Oct 2018 17:38)    Pt acutely presenting with fever of 103  - Continue with IV Zosyn  - Continue IVF for hydration   - Tylenol PRN   - Cooling measures; pt refusing hypothermia blanket or ice packs.   - F/u blood cx x2 and urine cx sent in ER  - Will continue to monitor  F/U with primary team in am    Tamanna KILLIAN  #[id number]    ADDENDUM  Repeat temp @ 12:03 of 103. Pt feels better; refusing hypothermia blanket. Creatinine, Serum: 0.96 mg/dL (10.26.18 @ 17:44); Motrin 400 mg x 1. Will continue to monitor.     Tamanna HONG  #[id number]    ADDENDUM  Pt hypotensive with SBP of 95 1:40 am. Pt asymptomatic; resting comfortably.  cc over 1hr and will re assess BP. CXR: low lung volumes. hazy opacity in the left lower lung may be secondary to atelectasis vs pneumonia. Pt with recent visit to hospital ; Vancomycin x 1 and will continue Zosyn. Will continue to monitor.     Tamanna HONG  #[id number]    ADDENDUM  Pt c/o headache; states "feels like my fever is coming back". Temp: 99.6. Pt requesting IV Tylenol. IV tylenol x 1 ordered.  D/w Dr. Powers about CXR results; Vancomycin given; recommended CT chest and azithromycin 500 IVPB qD. Will continue to monitor.     Tamanna HONG  #[id number]

## 2018-10-26 NOTE — ED ADULT NURSE NOTE - NSIMPLEMENTINTERV_GEN_ALL_ED
Implemented All Universal Safety Interventions:  [place] to call system. Call bell, personal items and telephone within reach. Instruct patient to call for assistance. Room bathroom lighting operational. Non-slip footwear when patient is off stretcher. Physically safe environment: no spills, clutter or unnecessary equipment. Stretcher in lowest position, wheels locked, appropriate side rails in place.

## 2018-10-26 NOTE — H&P ADULT - NSHPPHYSICALEXAM_GEN_ALL_CORE
PHYSICAL EXAMINATION:  Vital Signs Last 24 Hrs  T(C): 39.4 (26 Oct 2018 16:24), Max: 39.4 (26 Oct 2018 16:24)  T(F): 102.9 (26 Oct 2018 16:24), Max: 102.9 (26 Oct 2018 16:24)  HR: 115 (26 Oct 2018 16:24) (115 - 115)  BP: 122/80 (26 Oct 2018 16:24) (122/80 - 122/80)  BP(mean): --  RR: 18 (26 Oct 2018 16:24) (18 - 18)  SpO2: 94% (26 Oct 2018 16:24) (94% - 94%)  CAPILLARY BLOOD GLUCOSE            GENERAL: NAD, well-groomed,  HEAD:  atraumatic, normocephalic  EYES: sclera anicteric  ENMT: mucous membranes moist  NECK: supple, No JVD  CHEST/LUNG: clear to auscultation bilaterally;    no      rales   ,   no rhonchi,   HEART: normal S1, S2  ABDOMEN: BS+, soft, ND, NT   myalgias/  no flank tenderness  EXTREMITIES:    no    edema    b/l LEs  foot drop, right  NEURO: awake, ,     moves all extremities  SKIN: no     rash

## 2018-10-26 NOTE — H&P ADULT - HISTORY OF PRESENT ILLNESS
43y Male complaining of weakness.	    Pt is a 42yo M who recently came in with   a  R kidney stone,    p/w fevers and malaise for 2 days . After the patient was seen in the ED  and  sent home  with keflex,  2 days  ago, , home he was unable to sleep, eat food 2/2 nausea and continued to feel worse. His flank pain resolved by 2 days PTA, but it has recently started back up again.  He has been taking his abx as prescribed without much improvement and states that when he takes ibuprofen his fever abates, but returns within 2 hours.  He denies any diarrhea, constipation, vomiting, burning with urination. He endorses a productive cough, but denies nasal dripping or sore throat.	  now  in er.  with  chills/ malaise/  ill looking

## 2018-10-26 NOTE — ED PROVIDER NOTE - OBJECTIVE STATEMENT
Pt is a 44yo M who recently came in with sx suggesting a R kidney stone p/w fevers and malaise for 2 days. After the patient was seen in the ED he states that when he went home he was unable to sleep, eat food 2/2 nausea and continued to feel worse. His flank pain resolved by 2 days PTA, but it has recently started back up again. He has been taking his abx as prescribed without much improvement and states that when he takes ibuprofen his fever abates, but returns within 2 hours. He denies any diarrhea, constipation, vomiting, burning with urination. He endorses a productive cough, but denies nasal dripping or sore throat.

## 2018-10-26 NOTE — ED ADULT NURSE NOTE - OBJECTIVE STATEMENT
Pt BIBA for weakness and fever.  Pt seen here 2 days ago with right sided flank pain radiating to groin and dx with kidney stone, dc from ER.  Was afebrile at that time and d/c on Keflex BID, which he has been taking.  His flank pain began to resolve but then he started having decreased appetite and chills.  Flank pain and groin pain returned today.  Was taking Advil cold and sinus at home but fevers were returning after 2 hours, last took this morning 0900, no tylenol.  Pt currently conversive without difficulty, endorses cough but none noted during exam, abd soft/nt/nd, +nausea, +right sided cva tenderness, pain still radiating to groin, skin hot/diaphoretic, denies cp, sob, vomiting/diarrhea, abd pain, hematuria.  CODE SEPSIS initiated and 2 large bore IVs place, +IVF and IVabx.

## 2018-10-26 NOTE — ED PROVIDER NOTE - MEDICAL DECISION MAKING DETAILS
43yo M with sx suggesting sepsis. Will obtain CXR, RVP, blood cultures, CBC, CMP, and further workup for sepsis. Will start fluids and give tylenol. Will start cefepime for abx.

## 2018-10-26 NOTE — PATIENT PROFILE ADULT - STATED REASON FOR ADMISSION
Pt c/o excruciating back pain, d/c home but then returned w/ frequent uncontrollable fevers, rigors, sweating, nausea.

## 2018-10-26 NOTE — H&P ADULT - ASSESSMENT
pt  with  h/o  fx  right  ankle a year  ago/  h/o  spinal  stenosis,   now admitted  with fevers/  chills/ myalgias/   fleeting flank pain at  home,  resolved now  appears  ill looking,  tachycardia, febrile/ chills/   RVP    iv  fluids   zosyn   follow blood  c/s  labs  in am   cxr, pending   ct  abdomen, noted     < from: CT Abdomen and Pelvis No Cont (10.23.18 @ 21:46) >    IMPRESSION:   Minimal right perinephric stranding, which may be due to a recently   passed stone and/or urinary tract infection. No hydroureteronephrosis or   obstructing radiopaque urinary tract stone. Punctate nonobstructing right   renal stone.  Additional findings as described.  < end of copied text > pt  with  h/o  fx  right  ankle a year  ago/  h/o  spinal  stenosis,   now admitted  with fevers/  chills/ myalgias/   fleeting flank pain at  home,  resolved now  appears  ill looking,  tachycardia, febrile/ chills/   pyelonephritis/ ?  passed  stone  RVP    iv  fluids   zosyn   follow blood  c/s  labs  in am   cxr, pending   ct  abdomen, noted     < from: CT Abdomen and Pelvis No Cont (10.23.18 @ 21:46) >    IMPRESSION:   Minimal right perinephric stranding, which may be due to a recently   passed stone and/or urinary tract infection. No hydroureteronephrosis or   obstructing radiopaque urinary tract stone. Punctate nonobstructing right   renal stone.  Additional findings as described.  < end of copied text >

## 2018-10-26 NOTE — ED PROVIDER NOTE - ATTENDING CONTRIBUTION TO CARE
ATTENDING MD:  James LEHMAN, personally have seen and examined this patient.  I have discussed all aspects of care with the resident physician. Resident note reviewed and agree on plan of care and except where noted.  See HPI, PE, and MDM for details.    VITALS: SIRS+, normoxemic  GEN: ill appearing, nontoxic, A & O x 4  HEAD/EYES: NCAT, PERRL, EOMI, anicteric sclerae, no conjunctival pallor  ENT: mucus membranes dry, oropharynx WNL, trachea midline, no JVD  RESP: lungs CTA with equal breath sounds bilaterally, chest wall nontender and atraumatic  CV: heart tachycardic reg rhythm S1, S2, no murmur; distal pulses intact and symmetric bilaterally, no pedal edema  GI: normoactive bowel sounds. the abdomen is soft, nondistended, there is mild suprapubic tenderness, there are no palpable masses  : +R CVAT  MSK: diffuse muscular tendernes sof trunka nd extremities nonfocal, no bony tendernes sof back, enck or extremities  SKIN: warm, dry, no rash, no bruising, no cyanosis. color appropriate for ethnicity  NEURO: alert, mentating appropriately, no facial asymmetry. gross sensation, motor, coordination are intact, no photophobia or meningismus  PSYCH: Affect appropriate     MDM: pt appaers septic, likely pyelo. dry couhg will send RVP. will cover with antibitoics, admit to medicine. floor stable currently. sepsis pathway initiated. Ideal body weight used to target fluid bolus 2/2 elevated BMI.

## 2018-10-27 LAB
CULTURE RESULTS: SIGNIFICANT CHANGE UP
LEGIONELLA AG UR QL: NEGATIVE — SIGNIFICANT CHANGE UP
SPECIMEN SOURCE: SIGNIFICANT CHANGE UP

## 2018-10-27 PROCEDURE — 71250 CT THORAX DX C-: CPT | Mod: 26

## 2018-10-27 RX ORDER — ONDANSETRON 8 MG/1
4 TABLET, FILM COATED ORAL EVERY 6 HOURS
Qty: 0 | Refills: 0 | Status: DISCONTINUED | OUTPATIENT
Start: 2018-10-27 | End: 2018-10-30

## 2018-10-27 RX ORDER — HYDROMORPHONE HYDROCHLORIDE 2 MG/ML
0.5 INJECTION INTRAMUSCULAR; INTRAVENOUS; SUBCUTANEOUS EVERY 8 HOURS
Qty: 0 | Refills: 0 | Status: DISCONTINUED | OUTPATIENT
Start: 2018-10-27 | End: 2018-10-27

## 2018-10-27 RX ORDER — HYDROMORPHONE HYDROCHLORIDE 2 MG/ML
0.5 INJECTION INTRAMUSCULAR; INTRAVENOUS; SUBCUTANEOUS EVERY 4 HOURS
Qty: 0 | Refills: 0 | Status: DISCONTINUED | OUTPATIENT
Start: 2018-10-27 | End: 2018-10-28

## 2018-10-27 RX ORDER — AZITHROMYCIN 500 MG/1
500 TABLET, FILM COATED ORAL ONCE
Qty: 0 | Refills: 0 | Status: DISCONTINUED | OUTPATIENT
Start: 2018-10-27 | End: 2018-10-27

## 2018-10-27 RX ORDER — IPRATROPIUM/ALBUTEROL SULFATE 18-103MCG
3 AEROSOL WITH ADAPTER (GRAM) INHALATION EVERY 6 HOURS
Qty: 0 | Refills: 0 | Status: DISCONTINUED | OUTPATIENT
Start: 2018-10-27 | End: 2018-10-30

## 2018-10-27 RX ORDER — IBUPROFEN 200 MG
400 TABLET ORAL ONCE
Qty: 0 | Refills: 0 | Status: COMPLETED | OUTPATIENT
Start: 2018-10-27 | End: 2018-10-27

## 2018-10-27 RX ORDER — AZITHROMYCIN 500 MG/1
TABLET, FILM COATED ORAL
Qty: 0 | Refills: 0 | Status: DISCONTINUED | OUTPATIENT
Start: 2018-10-27 | End: 2018-10-27

## 2018-10-27 RX ORDER — ALBUTEROL 90 UG/1
1 AEROSOL, METERED ORAL EVERY 4 HOURS
Qty: 0 | Refills: 0 | Status: DISCONTINUED | OUTPATIENT
Start: 2018-10-27 | End: 2018-10-30

## 2018-10-27 RX ORDER — ACETAMINOPHEN 500 MG
1000 TABLET ORAL ONCE
Qty: 0 | Refills: 0 | Status: COMPLETED | OUTPATIENT
Start: 2018-10-27 | End: 2018-10-27

## 2018-10-27 RX ORDER — VANCOMYCIN HCL 1 G
1000 VIAL (EA) INTRAVENOUS ONCE
Qty: 0 | Refills: 0 | Status: COMPLETED | OUTPATIENT
Start: 2018-10-27 | End: 2018-10-27

## 2018-10-27 RX ORDER — ACETAMINOPHEN 500 MG
325 TABLET ORAL ONCE
Qty: 0 | Refills: 0 | Status: COMPLETED | OUTPATIENT
Start: 2018-10-27 | End: 2018-10-27

## 2018-10-27 RX ORDER — AZITHROMYCIN 500 MG/1
TABLET, FILM COATED ORAL
Qty: 0 | Refills: 0 | Status: DISCONTINUED | OUTPATIENT
Start: 2018-10-27 | End: 2018-10-30

## 2018-10-27 RX ORDER — AZITHROMYCIN 500 MG/1
500 TABLET, FILM COATED ORAL EVERY 24 HOURS
Qty: 0 | Refills: 0 | Status: DISCONTINUED | OUTPATIENT
Start: 2018-10-28 | End: 2018-10-30

## 2018-10-27 RX ORDER — HYDROMORPHONE HYDROCHLORIDE 2 MG/ML
0.5 INJECTION INTRAMUSCULAR; INTRAVENOUS; SUBCUTANEOUS ONCE
Qty: 0 | Refills: 0 | Status: DISCONTINUED | OUTPATIENT
Start: 2018-10-27 | End: 2018-10-27

## 2018-10-27 RX ORDER — SODIUM CHLORIDE 9 MG/ML
500 INJECTION INTRAMUSCULAR; INTRAVENOUS; SUBCUTANEOUS ONCE
Qty: 0 | Refills: 0 | Status: COMPLETED | OUTPATIENT
Start: 2018-10-27 | End: 2018-10-27

## 2018-10-27 RX ORDER — ZOLPIDEM TARTRATE 10 MG/1
5 TABLET ORAL AT BEDTIME
Qty: 0 | Refills: 0 | Status: DISCONTINUED | OUTPATIENT
Start: 2018-10-27 | End: 2018-10-28

## 2018-10-27 RX ORDER — OXYCODONE AND ACETAMINOPHEN 5; 325 MG/1; MG/1
2 TABLET ORAL THREE TIMES A DAY
Qty: 0 | Refills: 0 | Status: DISCONTINUED | OUTPATIENT
Start: 2018-10-27 | End: 2018-10-27

## 2018-10-27 RX ORDER — AZITHROMYCIN 500 MG/1
500 TABLET, FILM COATED ORAL ONCE
Qty: 0 | Refills: 0 | Status: COMPLETED | OUTPATIENT
Start: 2018-10-27 | End: 2018-10-27

## 2018-10-27 RX ORDER — TIOTROPIUM BROMIDE 18 UG/1
1 CAPSULE ORAL; RESPIRATORY (INHALATION) DAILY
Qty: 0 | Refills: 0 | Status: DISCONTINUED | OUTPATIENT
Start: 2018-10-27 | End: 2018-10-30

## 2018-10-27 RX ADMIN — HYDROMORPHONE HYDROCHLORIDE 0.5 MILLIGRAM(S): 2 INJECTION INTRAMUSCULAR; INTRAVENOUS; SUBCUTANEOUS at 07:17

## 2018-10-27 RX ADMIN — HYDROMORPHONE HYDROCHLORIDE 0.5 MILLIGRAM(S): 2 INJECTION INTRAMUSCULAR; INTRAVENOUS; SUBCUTANEOUS at 10:03

## 2018-10-27 RX ADMIN — Medication 650 MILLIGRAM(S): at 19:55

## 2018-10-27 RX ADMIN — Medication 100 MILLIGRAM(S): at 22:45

## 2018-10-27 RX ADMIN — PIPERACILLIN AND TAZOBACTAM 25 GRAM(S): 4; .5 INJECTION, POWDER, LYOPHILIZED, FOR SOLUTION INTRAVENOUS at 14:01

## 2018-10-27 RX ADMIN — Medication 400 MILLIGRAM(S): at 01:22

## 2018-10-27 RX ADMIN — HYDROMORPHONE HYDROCHLORIDE 0.5 MILLIGRAM(S): 2 INJECTION INTRAMUSCULAR; INTRAVENOUS; SUBCUTANEOUS at 15:19

## 2018-10-27 RX ADMIN — Medication 400 MILLIGRAM(S): at 00:52

## 2018-10-27 RX ADMIN — AZITHROMYCIN 250 MILLIGRAM(S): 500 TABLET, FILM COATED ORAL at 17:32

## 2018-10-27 RX ADMIN — Medication 3 MILLILITER(S): at 17:32

## 2018-10-27 RX ADMIN — SODIUM CHLORIDE 500 MILLILITER(S): 9 INJECTION INTRAMUSCULAR; INTRAVENOUS; SUBCUTANEOUS at 02:09

## 2018-10-27 RX ADMIN — Medication 1000 MILLIGRAM(S): at 07:16

## 2018-10-27 RX ADMIN — Medication 1000 MILLIGRAM(S): at 06:01

## 2018-10-27 RX ADMIN — Medication 3 MILLILITER(S): at 23:22

## 2018-10-27 RX ADMIN — Medication 250 MILLIGRAM(S): at 04:55

## 2018-10-27 RX ADMIN — ZOLPIDEM TARTRATE 5 MILLIGRAM(S): 10 TABLET ORAL at 22:45

## 2018-10-27 RX ADMIN — PIPERACILLIN AND TAZOBACTAM 25 GRAM(S): 4; .5 INJECTION, POWDER, LYOPHILIZED, FOR SOLUTION INTRAVENOUS at 05:35

## 2018-10-27 RX ADMIN — Medication 400 MILLIGRAM(S): at 05:31

## 2018-10-27 RX ADMIN — HEPARIN SODIUM 5000 UNIT(S): 5000 INJECTION INTRAVENOUS; SUBCUTANEOUS at 17:34

## 2018-10-27 RX ADMIN — PIPERACILLIN AND TAZOBACTAM 25 GRAM(S): 4; .5 INJECTION, POWDER, LYOPHILIZED, FOR SOLUTION INTRAVENOUS at 22:44

## 2018-10-27 RX ADMIN — HEPARIN SODIUM 5000 UNIT(S): 5000 INJECTION INTRAVENOUS; SUBCUTANEOUS at 10:10

## 2018-10-27 NOTE — PROGRESS NOTE ADULT - SUBJECTIVE AND OBJECTIVE BOX
febrile/  transient hypotension  chills  REVIEW OF SYSTEMS:  GEN: no fever,    no chills  RESP: no SOB,   no cough  CVS: no chest pain,   no palpitations  GI: no abdominal pain,   no nausea,   no vomiting,   no constipation,   no diarrhea  : no dysuria,   no frequency  NEURO: no headache,   no dizziness  PSYCH: no depression,   not anxious  Derm : no rash    MEDICATIONS  (STANDING):  heparin  Injectable 5000 Unit(s) SubCutaneous every 12 hours  lactated ringers. 1000 milliLiter(s) (100 mL/Hr) IV Continuous <Continuous>  piperacillin/tazobactam IVPB. 3.375 Gram(s) IV Intermittent every 8 hours    MEDICATIONS  (PRN):  acetaminophen   Tablet .. 650 milliGRAM(s) Oral every 6 hours PRN Temp greater or equal to 38C (100.4F)      Vital Signs Last 24 Hrs  T(C): 37.7 (27 Oct 2018 04:19), Max: 39.4 (26 Oct 2018 16:24)  T(F): 99.9 (27 Oct 2018 04:19), Max: 103 (26 Oct 2018 22:13)  HR: 89 (27 Oct 2018 04:19) (89 - 115)  BP: 114/74 (27 Oct 2018 04:19) (95/61 - 122/80)  BP(mean): --  RR: 18 (27 Oct 2018 04:19) (18 - 20)  SpO2: 95% (27 Oct 2018 04:19) (93% - 97%)  CAPILLARY BLOOD GLUCOSE        I&O's Summary    26 Oct 2018 07:01  -  27 Oct 2018 07:00  --------------------------------------------------------  IN: 0 mL / OUT: 600 mL / NET: -600 mL        PHYSICAL EXAM:  HEAD:  Atraumatic, Normocephalic  NECK: Supple, No   JVD  CHEST/LUNG:   no     rales,     no,    rhonchi  HEART: Regular rate and rhythm;         murmur  ABDOMEN: Soft, Nontender, ;   EXTREMITIES:        edema  NEUROLOGY:  alert    LABS:                        15.3   10.1  )-----------( 193      ( 26 Oct 2018 17:44 )             43.3     10    137  |  103  |  8   ----------------------------<  108<H>  3.7   |  19<L>  |  0.96    Ca    9.3      26 Oct 2018 17:44    TPro  7.3  /  Alb  3.9  /  TBili  1.6<H>  /  DBili  x   /  AST  21  /  ALT  24  /  AlkPhos  84  10-26    PT/INR - ( 26 Oct 2018 19:43 )   PT: 13.8 sec;   INR: 1.26 ratio         PTT - ( 26 Oct 2018 19:43 )  PTT:27.3 sec      Urinalysis Basic - ( 26 Oct 2018 18:31 )    Color: Yellow / Appearance: Slightly Turbid / S.024 / pH: x  Gluc: x / Ketone: Negative  / Bili: Negative / Urobili: Negative   Blood: x / Protein: 30 mg/dL / Nitrite: Negative   Leuk Esterase: Negative / RBC: 20 /hpf / WBC 9 /hpf   Sq Epi: x / Non Sq Epi: 11 /hpf / Bacteria: Negative          10-26 @ 18:02  3.4  54              Consultant(s) Notes Reviewed:      Care Discussed with Consultants/Other Providers: febrile/  transient hypotension/   ha s improved   right flank pain,  descending  to groin  chills  REVIEW OF SYSTEMS:  GEN: no fever,    no chills  RESP: no SOB,   no cough  CVS: no chest pain,   no palpitations  GI: abdominal pain,   no nausea,   no vomiting,   no constipation,   no diarrhea  : no dysuria,   no frequency  NEURO: no headache,   no dizziness  PSYCH: no depression,   not anxious  Derm : no rash    MEDICATIONS  (STANDING):  heparin  Injectable 5000 Unit(s) SubCutaneous every 12 hours  lactated ringers. 1000 milliLiter(s) (100 mL/Hr) IV Continuous <Continuous>  piperacillin/tazobactam IVPB. 3.375 Gram(s) IV Intermittent every 8 hours    MEDICATIONS  (PRN):  acetaminophen   Tablet .. 650 milliGRAM(s) Oral every 6 hours PRN Temp greater or equal to 38C (100.4F)      Vital Signs Last 24 Hrs  T(C): 37.7 (27 Oct 2018 04:19), Max: 39.4 (26 Oct 2018 16:24)  T(F): 99.9 (27 Oct 2018 04:19), Max: 103 (26 Oct 2018 22:13)  HR: 89 (27 Oct 2018 04:19) (89 - 115)  BP: 114/74 (27 Oct 2018 04:19) (95/61 - 122/80)  BP(mean): --  RR: 18 (27 Oct 2018 04:19) (18 - 20)  SpO2: 95% (27 Oct 2018 04:19) (93% - 97%)  CAPILLARY BLOOD GLUCOSE        I&O's Summary    26 Oct 2018 07:01  -  27 Oct 2018 07:00  --------------------------------------------------------  IN: 0 mL / OUT: 600 mL / NET: -600 mL        PHYSICAL EXAM:  HEAD:  Atraumatic, Normocephalic  NECK: Supple, No   JVD  CHEST/LUNG:   no     rales,     no,    rhonchi  HEART: Regular rate and rhythm;         murmur  ABDOMEN: Soft, Nontender, ;   EXTREMITIES:        edema  NEUROLOGY:  alert    LABS:                        15.3   10.1  )-----------( 193      ( 26 Oct 2018 17:44 )             43.3     10-26    137  |  103  |  8   ----------------------------<  108<H>  3.7   |  19<L>  |  0.96    Ca    9.3      26 Oct 2018 17:44    TPro  7.3  /  Alb  3.9  /  TBili  1.6<H>  /  DBili  x   /  AST  21  /  ALT  24  /  AlkPhos  84  10-26    PT/INR - ( 26 Oct 2018 19:43 )   PT: 13.8 sec;   INR: 1.26 ratio         PTT - ( 26 Oct 2018 19:43 )  PTT:27.3 sec      Urinalysis Basic - ( 26 Oct 2018 18:31 )    Color: Yellow / Appearance: Slightly Turbid / S.024 / pH: x  Gluc: x / Ketone: Negative  / Bili: Negative / Urobili: Negative   Blood: x / Protein: 30 mg/dL / Nitrite: Negative   Leuk Esterase: Negative / RBC: 20 /hpf / WBC 9 /hpf   Sq Epi: x / Non Sq Epi: 11 /hpf / Bacteria: Negative          10-26 @ 18:02  3.4  54              Consultant(s) Notes Reviewed:      Care Discussed with Consultants/Other Providers:

## 2018-10-27 NOTE — CONSULT NOTE ADULT - ATTENDING COMMENTS
as above  agree with assessment and plan  c/o headache only -not very interactive at present    suggest continue current plan    f/u as outpt

## 2018-10-27 NOTE — CONSULT NOTE ADULT - ASSESSMENT
44 yo male with right renal colic and sepsis concerning for pyelonephritis. 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   no surgical intervention at this time   will ck urine color with next void  cont abx  fu cx   trend fever curve   nsaids as able for pain control

## 2018-10-27 NOTE — CONSULT NOTE ADULT - SUBJECTIVE AND OBJECTIVE BOX
HPI:   Patient is a 43y male with chronic back pain and leg weakness requires a cane who developed some flank pain a few days ago with some fever, came to ER, ct showed right perinephric stranding and possible evidence of a passed stone, large blood on UA. He was given po keflex but urine culture is negative. He came back to the hospital yesterday for escalating fever, severe sinus pressure and migrainous headache, cough with some phlegm. He is now vomiting since being given dilaudid. He was placed on zosyn and now zithromax since chest ct shows multifocal pneumonia. He has no dysuria or gross hematuria. He has no chest pain. No diarrhea. He had no recent travel. He has no recent dental work. No animal exposure,He has been exposed to viral uri in child. He lives home on disability for back pain. He had a small amount of bloody sputum today but not prior.     REVIEW OF SYSTEMS:  All other review of systems negative (Comprehensive ROS)    PAST MEDICAL & SURGICAL HISTORY:  No pertinent past medical history  S/P ORIF (open reduction internal fixation) fracture: of the right ankle in 2016  H/O lumbar discectomy      Allergies    No Known Allergies    Intolerances        Antimicrobials Day #  :2  azithromycin  IVPB 500 milliGRAM(s) IV Intermittent once  azithromycin  IVPB      piperacillin/tazobactam IVPB. 3.375 Gram(s) IV Intermittent every 8 hours    Other Medications:  acetaminophen   Tablet .. 650 milliGRAM(s) Oral every 6 hours PRN  ALBUTerol    90 MICROgram(s) HFA Inhaler 1 Puff(s) Inhalation every 4 hours  ALBUTerol/ipratropium for Nebulization 3 milliLiter(s) Nebulizer every 6 hours  benzonatate 100 milliGRAM(s) Oral three times a day  heparin  Injectable 5000 Unit(s) SubCutaneous every 12 hours  HYDROmorphone  Injectable 0.5 milliGRAM(s) IV Push every 4 hours PRN  lactated ringers. 1000 milliLiter(s) IV Continuous <Continuous>  tiotropium 18 MICROgram(s) Capsule 1 Capsule(s) Inhalation daily  zolpidem 5 milliGRAM(s) Oral at bedtime      FAMILY HISTORY:      SOCIAL HISTORY:  Smoking: [ ]Yes [x ]No  ETOH: [ ]Yes [ x]No  Drug Use: [ ]Yes [ x]No      T(F): 99.9 (10-27-18 @ 14:07), Max: 103 (10-26-18 @ 22:13)  HR: 98 (10-27-18 @ 14:07)  BP: 140/92 (10-27-18 @ 14:07)  RR: 18 (10-27-18 @ 14:07)  SpO2: 97% (10-27-18 @ 14:07)  Wt(kg): --    PHYSICAL EXAM:  General: sleepy with dilaudid, vomited  Eyes:  anicteric, no conjunctival injection, no discharge  Oropharynx: no lesions or injection 	  Neck: supple, without adenopathy  Lungs: rales and rhonchi, to auscultation  Heart: regular rate and rhythm; no murmur, rubs or gallops  Abdomen: soft, nondistended, nontender, without mass or organomegaly, obese  Skin: no lesions  Extremities: no clubbing, cyanosis,. trace pedal edema  Neurologic: alert, oriented, moves all extremities but very diffusely weak.   scrotum no swelling  back no ulcers  LAB RESULTS:                        15.3   10.1  )-----------( 193      ( 26 Oct 2018 17:44 )             43.3     10-26    137  |  103  |  8   ----------------------------<  108<H>  3.7   |  19<L>  |  0.96    Ca    9.3      26 Oct 2018 17:44    TPro  7.3  /  Alb  3.9  /  TBili  1.6<H>  /  DBili  x   /  AST  21  /  ALT  24  /  AlkPhos  84  10-26    LIVER FUNCTIONS - ( 26 Oct 2018 17:44 )  Alb: 3.9 g/dL / Pro: 7.3 g/dL / ALK PHOS: 84 U/L / ALT: 24 U/L / AST: 21 U/L / GGT: x           Urinalysis Basic - ( 26 Oct 2018 18:31 )    Color: Yellow / Appearance: Slightly Turbid / S.024 / pH: x  Gluc: x / Ketone: Negative  / Bili: Negative / Urobili: Negative   Blood: x / Protein: 30 mg/dL / Nitrite: Negative   Leuk Esterase: Negative / RBC: 20 /hpf / WBC 9 /hpf   Sq Epi: x / Non Sq Epi: 11 /hpf / Bacteria: Negative        MICROBIOLOGY:  RECENT CULTURES:  10-26 @ 20:44 .Urine Clean Catch (Midstream)     <10,000 CFU/ml Normal Urogenital cyndi present      10-24 @ 03:06 .Urine Clean Catch (Midstream)     No growth      RADIOLOGY REVIEWED:  < from: CT Chest No Cont (10.27.18 @ 15:49) >    EXAM:  CT CHEST                            PROCEDURE DATE:  10/27/2018            INTERPRETATION:  CLINICAL INFORMATION: Fevers.    COMPARISON: None.    PROCEDURE:   CT of the Chest was performed without intravenous contrast.  Sagittal and coronal reformats were performed.  Axial MIP reformats were performed.    FINDINGS:    CHEST:     LUNGS AND LARGE AIRWAYS: Patent central airways.  Patchy opacities in the   left lower lobe and right upper lobe, consistent with multifocal   pneumonia.  PLEURA: No pleural effusion or pneumothorax.  VESSELS: Within normal limits.  HEART: Heart size is normal. No pericardial effusion.  MEDIASTINUM AND JESSE: No lymphadenopathy.  CHEST WALL AND LOWER NECK: Within normal limits.  VISUALIZED UPPER ABDOMEN: Within normal limits.  BONES: Within normal limits.    IMPRESSION: Multifocal pneumonia as above.      < from: CT Abdomen and Pelvis No Cont (10.23.18 @ 21:46) >  EXAM:  CT ABDOMEN AND PELVIS                            PROCEDURE DATE:  10/23/2018            INTERPRETATION:  CLINICAL INFORMATION: Right flank pain    COMPARISON: 2010 study is not available    PROCEDURE:   CT of the Abdomen and Pelvis wasperformed without intravenous contrast   in the prone position.  Intravenous contrast: None.  Oral contrast: None.  Sagittal and coronal reformats were performed.    FINDINGS: Evaluation of the abdominal/pelvic organs, viscera and   vasculature is limited without intravenous contrast.     LOWER CHEST: Unremarkable.    LIVER: Diffuse low-attenuation indicating fatty infiltration with sparing   near the gallbladder fossa.  GALLBLADDER/BILE DUCTS: No intrahepatic or extrahepatic biliary   dilatation. No radiopaque gallstone.  PANCREAS: Unremarkable.  SPLEEN: Unremarkable.    ADRENALS: Unremarkable.  KIDNEYS/URETERS: Minimal right perinephric stranding without   hydroureteronephrosis or obstructing radiopaque urinary tract stone.   Punctate 0.2 cm nonobstructing right lower pole renal stone. Duplicated   right renal collecting system.  BLADDER: Partially distended.  REPRODUCTIVE ORGANS: Unremarkable.    BOWEL: No bowel obstruction. Unremarkable appendix. Colon diverticulosis.  PERITONEUM: No drainable fluid collection or free air.  VESSELS: Normal caliber of the abdominal aorta.   RETROPERITONEUM: No lymphadenopathy.    ABDOMINAL WALL/SOFT TISSUES: Small fat-containing umbilical and inguinal   hernias.  BONES: Degenerative changes of the spine.     Minimal right perinephric stranding, which may be due to a recently   passed stone and/or urinary tract infection. No hydroureteronephrosis or   obstructing radiopaque urinary tract stone. Punctate nonobstructing right   renal stone.    Additional findings as described.    Imp: Patient with a few days of now escalating fever, initially flank pain and maybe passed a stone but urine culture negative now with some cough productive of blood streak sputum and bad headache, exam with rhonchi and rales, nausea and vomiting suspect from dilaudid, ct shows multifocal pneumonia. He got a few days of keflex for concern of uti but all urine cultures negative. Suspect community acquired pneumonia is driving his current fever.   Recommendations:  For now will continue zosyn and azithromax(got a dose of vanco)  follow up cultures  follow up urine legionella ag  supportive care   would favor limiting narcotics since making him vomit  check am procalcitonin  repeat cmp since bili a bit up  check cpk and urine myoglobin since large blood on ua but few wbc HPI:   Patient is a 43y male with chronic back pain and leg weakness requires a cane who developed some flank pain a few days ago with some fever, came to ER, ct showed right perinephric stranding and possible evidence of a passed stone, large blood on UA. He was given po keflex but urine culture is negative. He came back to the hospital yesterday for escalating fever, severe sinus pressure and migrainous headache, cough with some phlegm. He is now vomiting since being given dilaudid. He was placed on zosyn and now zithromax since chest ct shows multifocal pneumonia. He has no dysuria or gross hematuria. He has no chest pain. No diarrhea. He had no recent travel. He has no recent dental work. No animal exposure,He has been exposed to viral uri in child. He lives home on disability for back pain. He had a small amount of bloody sputum today but not prior.     REVIEW OF SYSTEMS:  All other review of systems negative (Comprehensive ROS)    PAST MEDICAL & SURGICAL HISTORY:  No pertinent past medical history  S/P ORIF (open reduction internal fixation) fracture: of the right ankle in 2016  H/O lumbar discectomy      Allergies    No Known Allergies    Intolerances        Antimicrobials Day #  :2  azithromycin  IVPB 500 milliGRAM(s) IV Intermittent once  azithromycin  IVPB      piperacillin/tazobactam IVPB. 3.375 Gram(s) IV Intermittent every 8 hours    Other Medications:  acetaminophen   Tablet .. 650 milliGRAM(s) Oral every 6 hours PRN  ALBUTerol    90 MICROgram(s) HFA Inhaler 1 Puff(s) Inhalation every 4 hours  ALBUTerol/ipratropium for Nebulization 3 milliLiter(s) Nebulizer every 6 hours  benzonatate 100 milliGRAM(s) Oral three times a day  heparin  Injectable 5000 Unit(s) SubCutaneous every 12 hours  HYDROmorphone  Injectable 0.5 milliGRAM(s) IV Push every 4 hours PRN  lactated ringers. 1000 milliLiter(s) IV Continuous <Continuous>  tiotropium 18 MICROgram(s) Capsule 1 Capsule(s) Inhalation daily  zolpidem 5 milliGRAM(s) Oral at bedtime      FAMILY HISTORY:      SOCIAL HISTORY:  Smoking: [ ]Yes [x ]No  ETOH: [ ]Yes [ x]No  Drug Use: [ ]Yes [ x]No      T(F): 99.9 (10-27-18 @ 14:07), Max: 103 (10-26-18 @ 22:13)  HR: 98 (10-27-18 @ 14:07)  BP: 140/92 (10-27-18 @ 14:07)  RR: 18 (10-27-18 @ 14:07)  SpO2: 97% (10-27-18 @ 14:07)  Wt(kg): --    PHYSICAL EXAM:  General: sleepy with dilaudid, vomited  Eyes:  anicteric, no conjunctival injection, no discharge  Oropharynx: no lesions or injection 	  Neck: supple, without adenopathy  Lungs: rales and rhonchi, to auscultation  Heart: regular rate and rhythm; no murmur, rubs or gallops  Abdomen: soft, nondistended, nontender, without mass or organomegaly, obese  Skin: no lesions  Extremities: no clubbing, cyanosis,. trace pedal edema  Neurologic: alert, oriented, moves all extremities but very diffusely weak.   scrotum no swelling  back no ulcers  LAB RESULTS:                        15.3   10.1  )-----------( 193      ( 26 Oct 2018 17:44 )             43.3     10-26    137  |  103  |  8   ----------------------------<  108<H>  3.7   |  19<L>  |  0.96    Ca    9.3      26 Oct 2018 17:44    TPro  7.3  /  Alb  3.9  /  TBili  1.6<H>  /  DBili  x   /  AST  21  /  ALT  24  /  AlkPhos  84  10-26    LIVER FUNCTIONS - ( 26 Oct 2018 17:44 )  Alb: 3.9 g/dL / Pro: 7.3 g/dL / ALK PHOS: 84 U/L / ALT: 24 U/L / AST: 21 U/L / GGT: x           Urinalysis Basic - ( 26 Oct 2018 18:31 )    Color: Yellow / Appearance: Slightly Turbid / S.024 / pH: x  Gluc: x / Ketone: Negative  / Bili: Negative / Urobili: Negative   Blood: x / Protein: 30 mg/dL / Nitrite: Negative   Leuk Esterase: Negative / RBC: 20 /hpf / WBC 9 /hpf   Sq Epi: x / Non Sq Epi: 11 /hpf / Bacteria: Negative        MICROBIOLOGY:  RECENT CULTURES:  10-26 @ 20:44 .Urine Clean Catch (Midstream)     <10,000 CFU/ml Normal Urogenital cyndi present      10-24 @ 03:06 .Urine Clean Catch (Midstream)     No growth      RADIOLOGY REVIEWED:  < from: CT Chest No Cont (10.27.18 @ 15:49) >    EXAM:  CT CHEST                            PROCEDURE DATE:  10/27/2018            INTERPRETATION:  CLINICAL INFORMATION: Fevers.    COMPARISON: None.    PROCEDURE:   CT of the Chest was performed without intravenous contrast.  Sagittal and coronal reformats were performed.  Axial MIP reformats were performed.    FINDINGS:    CHEST:     LUNGS AND LARGE AIRWAYS: Patent central airways.  Patchy opacities in the   left lower lobe and right upper lobe, consistent with multifocal   pneumonia.  PLEURA: No pleural effusion or pneumothorax.  VESSELS: Within normal limits.  HEART: Heart size is normal. No pericardial effusion.  MEDIASTINUM AND JESSE: No lymphadenopathy.  CHEST WALL AND LOWER NECK: Within normal limits.  VISUALIZED UPPER ABDOMEN: Within normal limits.  BONES: Within normal limits.    IMPRESSION: Multifocal pneumonia as above.      < from: CT Abdomen and Pelvis No Cont (10.23.18 @ 21:46) >  EXAM:  CT ABDOMEN AND PELVIS                            PROCEDURE DATE:  10/23/2018            INTERPRETATION:  CLINICAL INFORMATION: Right flank pain    COMPARISON: 2010 study is not available    PROCEDURE:   CT of the Abdomen and Pelvis wasperformed without intravenous contrast   in the prone position.  Intravenous contrast: None.  Oral contrast: None.  Sagittal and coronal reformats were performed.    FINDINGS: Evaluation of the abdominal/pelvic organs, viscera and   vasculature is limited without intravenous contrast.     LOWER CHEST: Unremarkable.    LIVER: Diffuse low-attenuation indicating fatty infiltration with sparing   near the gallbladder fossa.  GALLBLADDER/BILE DUCTS: No intrahepatic or extrahepatic biliary   dilatation. No radiopaque gallstone.  PANCREAS: Unremarkable.  SPLEEN: Unremarkable.    ADRENALS: Unremarkable.  KIDNEYS/URETERS: Minimal right perinephric stranding without   hydroureteronephrosis or obstructing radiopaque urinary tract stone.   Punctate 0.2 cm nonobstructing right lower pole renal stone. Duplicated   right renal collecting system.  BLADDER: Partially distended.  REPRODUCTIVE ORGANS: Unremarkable.    BOWEL: No bowel obstruction. Unremarkable appendix. Colon diverticulosis.  PERITONEUM: No drainable fluid collection or free air.  VESSELS: Normal caliber of the abdominal aorta.   RETROPERITONEUM: No lymphadenopathy.    ABDOMINAL WALL/SOFT TISSUES: Small fat-containing umbilical and inguinal   hernias.  BONES: Degenerative changes of the spine.     Minimal right perinephric stranding, which may be due to a recently   passed stone and/or urinary tract infection. No hydroureteronephrosis or   obstructing radiopaque urinary tract stone. Punctate nonobstructing right   renal stone.    Additional findings as described.    Imp: Patient with a few days of now escalating fever, initially flank pain and maybe passed a stone but urine culture negative now with some cough productive of blood streak sputum and bad headache, exam with rhonchi and rales, nausea and vomiting suspect from dilaudid, ct shows multifocal pneumonia. He got a few days of keflex for concern of uti but all urine cultures negative. Suspect community acquired pneumonia is driving his current fever.   Recommendations:  For now will continue zosyn and azithromax(got a dose of vanco)  follow up cultures  follow up urine legionella ag  supportive care   would favor limiting narcotics since making him vomit  check am procalcitonin  repeat cmp since bili a bit up  check cpk and urine myoglobin since large blood on ua but not many rbc  may need to repeat abdomen imaging with US if right flank pain returns to r/o radiolucent kidney stone as a reason for his presentation as well

## 2018-10-27 NOTE — PROVIDER CONTACT NOTE (MEDICATION) - SITUATION
patient complaining of a bad headache. pt states it feels like a migraine. pt refusing tylenol. pt stated he does not think the tylenol will help.

## 2018-10-27 NOTE — PROGRESS NOTE ADULT - ASSESSMENT
pt  with  h/o  fx  right  ankle a year  ago/  h/o  spinal  stenosis,   now admitted  with fevers/  chills/ myalgias/   fleeting flank pain at  home,  resolved now  appears  ill looking,  tachycardia, febrile/ chills/   sepsis   from pyelonephritis/ ?  passed  stone  RVP negative    iv  fluids   zosyn   follow blood  c/s   cxr, no pna/  ct chest, pending   ct  abdomen, noted     < from: CT Abdomen and Pelvis No Cont (10.23.18 @ 21:46) >  IMPRESSION:   Minimal right perinephric stranding, which may be due to a recently   passed stone and/or urinary tract infection. No hydroureteronephrosis or   obstructing radiopaque urinary tract stone. Punctate nonobstructing right   renal stone.  Additional findings as described.  < end of copied text > pt  with  h/o  fx  right  ankle a year  ago/  h/o  spinal  stenosis,   now admitted  with fevers/  chills/ myalgias/   fleeting flank pain at  home,  resolved now  appears  ill looking,  tachycardia, febrile/ chills/   sepsis   from pyelonephritis/ ?  passed  stone  RVP negative    iv  fluids   zosyn   follow blood  c/s   cxr, no pna/  ct chest, pending   ct  abdomen, noted  urology  eval   dilaudid,  for  headaches     < from: CT Abdomen and Pelvis No Cont (10.23.18 @ 21:46) >  IMPRESSION:   Minimal right perinephric stranding, which may be due to a recently   passed stone and/or urinary tract infection. No hydroureteronephrosis or   obstructing radiopaque urinary tract stone. Punctate nonobstructing right   renal stone.  Additional findings as described.  < end of copied text >

## 2018-10-27 NOTE — CONSULT NOTE ADULT - SUBJECTIVE AND OBJECTIVE BOX
43yMale who arrives with right renal colic five days ago to the ER. Was dx with UTI and rx keflex and told a stone likely passed. Thereafter developed fevers at home that were persistent and therefore came to ER yesterday. + fevers/ chills/ nausea poor appetite/ right flank pain to groin/ dec urinations dark colored urine ? red. Here with fevers 103, tachycardia, hypotension   Denies sensation of incomplete emptying/ dysuria/ freq   Saw Dr. Gary Goldberg for terminal hematuria two years ago that was attributed to stone passage at this time      PAST MEDICAL & SURGICAL HISTORY:  No pertinent past medical history  S/P ORIF (open reduction internal fixation) fracture: of the right ankle in 2016  H/O lumbar discectomy      MEDICATIONS  (STANDING):  heparin  Injectable 5000 Unit(s) SubCutaneous every 12 hours  lactated ringers. 1000 milliLiter(s) (100 mL/Hr) IV Continuous <Continuous>  piperacillin/tazobactam IVPB. 3.375 Gram(s) IV Intermittent every 8 hours  zolpidem 5 milliGRAM(s) Oral at bedtime    MEDICATIONS  (PRN):  acetaminophen   Tablet .. 650 milliGRAM(s) Oral every 6 hours PRN Temp greater or equal to 38C (100.4F)  HYDROmorphone  Injectable 0.5 milliGRAM(s) IV Push every 8 hours PRN Moderate Pain (4 - 6)      FAMILY HISTORY: + nephrolithiasis       Allergies    No Known Allergies    Intolerances        SOCIAL HISTORY:    REVIEW OF SYSTEMS: Otherwise negative as stated in HPI    Physical Exam  Vital signs  T(C): 37.7 (10-27-18 @ 04:19), Max: 39.4 (10-26-18 @ 16:24)  HR: 89 (10-27-18 @ 04:19)  BP: 114/74 (10-27-18 @ 04:19)  SpO2: 95% (10-27-18 @ 04:19)  Wt(kg): --    Output    UOP  none available   Gen:  AWAKE ALERT NAD AXOX3    Pulm:  NO RESP DISTRESS  	  CV:  S1S2    GI:  OBESE SOFT NT/ND  NO CVAT BL   NONPALP BLADDER NONTENDER  NEG PSOAS/ REBOUND/ GUARDING     :  DEFERRED      LABS:                            15.3   10.1  )-----------( 193      ( 26 Oct 2018 17:44 )             43.3       10-26    137  |  103  |  8   ----------------------------<  108<H>  3.7   |  19<L>  |  0.96    Ca    9.3      26 Oct 2018 17:44    TPro  7.3  /  Alb  3.9  /  TBili  1.6<H>  /  DBili  x   /  AST  21  /  ALT  24  /  AlkPhos  84  10-26    PT/INR - ( 26 Oct 2018 19:43 )   PT: 13.8 sec;   INR: 1.26 ratio         PTT - ( 26 Oct 2018 19:43 )  PTT:27.3 sec  Urinalysis Basic - ( 26 Oct 2018 18:31 )    Color: Yellow / Appearance: Slightly Turbid / S.024 / pH: x  Gluc: x / Ketone: Negative  / Bili: Negative / Urobili: Negative   Blood: x / Protein: 30 mg/dL / Nitrite: Negative   Leuk Esterase: Negative / RBC: 20 /hpf / WBC 9 /hpf   Sq Epi: x / Non Sq Epi: 11 /hpf / Bacteria: Negative        Urine Cx: Culture - Urine (10.24.18 @ 03:06)    Specimen Source: .Urine Clean Catch (Midstream)    Culture Results:   No growth    Urine Microscopic-Add On (NC) (10.26.18 @ 18:31)    Uric Acid Crystals: Few    Red Blood Cell - Urine: 20 /hpf    White Blood Cell - Urine: 9 /hpf    Hyaline Casts: 5 /lpf    Bacteria: Negative    Epithelial Cells: 11 /hpf    Urinalysis + Microscopic Examination (10.23.18 @ 22:38)    Urine Appearance: Slightly Turbid    Nitrite: Negative    pH Urine: 5.5    Leukocyte Esterase Concentration: Small    Protein, Urine: 30 mg/dL    Urobilinogen: Negative    Specific Gravity: 1.026    Glucose Qualitative, Urine: Negative    Color: Light Orange    Bilirubin: Negative    Ketone - Urine: Negative    Blood, Urine: Large    Red Blood Cell - Urine: 16 /hpf    White Blood Cell - Urine: 11 /hpf    Epithelial Cells: 5 /hpf    Hyaline Casts: 5 /lpf    Bacteria: Few      Blood Cx: SENT     < from: CT Abdomen and Pelvis No Cont (10.23.18 @ 21:46) >  FINDINGS: Evaluation of the abdominal/pelvic organs, viscera and   vasculature is limited without intravenous contrast.     LOWER CHEST: Unremarkable.    LIVER: Diffuse low-attenuation indicating fatty infiltration with sparing   near the gallbladder fossa.  GALLBLADDER/BILE DUCTS: No intrahepatic or extrahepatic biliary   dilatation. No radiopaque gallstone.  PANCREAS: Unremarkable.  SPLEEN: Unremarkable.    ADRENALS: Unremarkable.  KIDNEYS/URETERS: Minimal right perinephric stranding without   hydroureteronephrosis or obstructing radiopaque urinary tract stone.   Punctate 0.2 cm nonobstructing right lower pole renal stone. Duplicated   right renal collecting system.  BLADDER: Partially distended.  REPRODUCTIVE ORGANS: Unremarkable.    BOWEL: No bowel obstruction. Unremarkable appendix. Colon diverticulosis.  PERITONEUM: No drainable fluid collection or free air.  VESSELS: Normal caliber of the abdominal aorta.   RETROPERITONEUM: No lymphadenopathy.    ABDOMINAL WALL/SOFT TISSUES: Small fat-containing umbilical and inguinal   hernias.  BONES: Degenerative changes of the spine.     IMPRESSION:     Minimal right perinephric stranding, which may be due to a recently   passed stone and/or urinary tract infection. No hydroureteronephrosis or   obstructing radiopaque urinary tract stone. Punctate nonobstructing right   renal stone.    Additional findings as described.    < end of copied text >

## 2018-10-27 NOTE — PROVIDER CONTACT NOTE (OTHER) - ACTION/TREATMENT ORDERED:
500 bolus ordered for the blood pressure.   pain medication previously administered (motrin PO) will continue to monitor.

## 2018-10-27 NOTE — PROVIDER CONTACT NOTE (MEDICATION) - ASSESSMENT
vss and documented. pt eyes are red and watery. patient states the pressure headache is in the middle of his head and by his eyes. pt denies numbness or tingling.

## 2018-10-28 LAB
CHOLEST SERPL-MCNC: 187 MG/DL — SIGNIFICANT CHANGE UP (ref 10–199)
CK SERPL-CCNC: 33 U/L — SIGNIFICANT CHANGE UP (ref 30–200)
HCT VFR BLD CALC: 35.7 % — LOW (ref 39–50)
HDLC SERPL-MCNC: 33 MG/DL — LOW
HGB BLD-MCNC: 12.7 G/DL — LOW (ref 13–17)
LIPID PNL WITH DIRECT LDL SERPL: 124 MG/DL — SIGNIFICANT CHANGE UP
MCHC RBC-ENTMCNC: 29.1 PG — SIGNIFICANT CHANGE UP (ref 27–34)
MCHC RBC-ENTMCNC: 35.6 GM/DL — SIGNIFICANT CHANGE UP (ref 32–36)
MCV RBC AUTO: 81.9 FL — SIGNIFICANT CHANGE UP (ref 80–100)
PLATELET # BLD AUTO: 164 K/UL — SIGNIFICANT CHANGE UP (ref 150–400)
RBC # BLD: 4.36 M/UL — SIGNIFICANT CHANGE UP (ref 4.2–5.8)
RBC # FLD: 12.8 % — SIGNIFICANT CHANGE UP (ref 10.3–14.5)
TOTAL CHOLESTEROL/HDL RATIO MEASUREMENT: 5.7 RATIO — SIGNIFICANT CHANGE UP (ref 3.4–9.6)
TRIGL SERPL-MCNC: 150 MG/DL — HIGH (ref 10–149)
TSH SERPL-MCNC: 1.94 UIU/ML — SIGNIFICANT CHANGE UP (ref 0.27–4.2)
WBC # BLD: 8.26 K/UL — SIGNIFICANT CHANGE UP (ref 3.8–10.5)
WBC # FLD AUTO: 8.26 K/UL — SIGNIFICANT CHANGE UP (ref 3.8–10.5)

## 2018-10-28 RX ORDER — OXYCODONE AND ACETAMINOPHEN 5; 325 MG/1; MG/1
1 TABLET ORAL EVERY 6 HOURS
Qty: 0 | Refills: 0 | Status: DISCONTINUED | OUTPATIENT
Start: 2018-10-28 | End: 2018-10-30

## 2018-10-28 RX ORDER — OXYCODONE AND ACETAMINOPHEN 5; 325 MG/1; MG/1
1 TABLET ORAL ONCE
Qty: 0 | Refills: 0 | Status: DISCONTINUED | OUTPATIENT
Start: 2018-10-28 | End: 2018-10-28

## 2018-10-28 RX ORDER — SODIUM CHLORIDE 9 MG/ML
1000 INJECTION INTRAMUSCULAR; INTRAVENOUS; SUBCUTANEOUS
Qty: 0 | Refills: 0 | Status: DISCONTINUED | OUTPATIENT
Start: 2018-10-28 | End: 2018-10-29

## 2018-10-28 RX ADMIN — HEPARIN SODIUM 5000 UNIT(S): 5000 INJECTION INTRAVENOUS; SUBCUTANEOUS at 07:11

## 2018-10-28 RX ADMIN — OXYCODONE AND ACETAMINOPHEN 1 TABLET(S): 5; 325 TABLET ORAL at 13:07

## 2018-10-28 RX ADMIN — Medication 100 MILLIGRAM(S): at 21:20

## 2018-10-28 RX ADMIN — Medication 3 MILLILITER(S): at 05:08

## 2018-10-28 RX ADMIN — ONDANSETRON 4 MILLIGRAM(S): 8 TABLET, FILM COATED ORAL at 05:06

## 2018-10-28 RX ADMIN — Medication 100 MILLIGRAM(S): at 08:15

## 2018-10-28 RX ADMIN — ONDANSETRON 4 MILLIGRAM(S): 8 TABLET, FILM COATED ORAL at 12:54

## 2018-10-28 RX ADMIN — PIPERACILLIN AND TAZOBACTAM 25 GRAM(S): 4; .5 INJECTION, POWDER, LYOPHILIZED, FOR SOLUTION INTRAVENOUS at 13:01

## 2018-10-28 RX ADMIN — Medication 650 MILLIGRAM(S): at 05:33

## 2018-10-28 RX ADMIN — HEPARIN SODIUM 5000 UNIT(S): 5000 INJECTION INTRAVENOUS; SUBCUTANEOUS at 05:13

## 2018-10-28 RX ADMIN — OXYCODONE AND ACETAMINOPHEN 1 TABLET(S): 5; 325 TABLET ORAL at 13:37

## 2018-10-28 RX ADMIN — OXYCODONE AND ACETAMINOPHEN 1 TABLET(S): 5; 325 TABLET ORAL at 03:40

## 2018-10-28 RX ADMIN — Medication 3 MILLILITER(S): at 23:48

## 2018-10-28 RX ADMIN — HEPARIN SODIUM 5000 UNIT(S): 5000 INJECTION INTRAVENOUS; SUBCUTANEOUS at 17:28

## 2018-10-28 RX ADMIN — PIPERACILLIN AND TAZOBACTAM 25 GRAM(S): 4; .5 INJECTION, POWDER, LYOPHILIZED, FOR SOLUTION INTRAVENOUS at 21:20

## 2018-10-28 RX ADMIN — OXYCODONE AND ACETAMINOPHEN 1 TABLET(S): 5; 325 TABLET ORAL at 04:20

## 2018-10-28 RX ADMIN — Medication 650 MILLIGRAM(S): at 07:11

## 2018-10-28 RX ADMIN — Medication 100 MILLIGRAM(S): at 13:01

## 2018-10-28 RX ADMIN — Medication 650 MILLIGRAM(S): at 21:20

## 2018-10-28 RX ADMIN — Medication 650 MILLIGRAM(S): at 22:20

## 2018-10-28 RX ADMIN — Medication 3 MILLILITER(S): at 13:01

## 2018-10-28 RX ADMIN — SODIUM CHLORIDE 50 MILLILITER(S): 9 INJECTION INTRAMUSCULAR; INTRAVENOUS; SUBCUTANEOUS at 09:33

## 2018-10-28 RX ADMIN — AZITHROMYCIN 250 MILLIGRAM(S): 500 TABLET, FILM COATED ORAL at 17:28

## 2018-10-28 RX ADMIN — ONDANSETRON 4 MILLIGRAM(S): 8 TABLET, FILM COATED ORAL at 20:15

## 2018-10-28 RX ADMIN — PIPERACILLIN AND TAZOBACTAM 25 GRAM(S): 4; .5 INJECTION, POWDER, LYOPHILIZED, FOR SOLUTION INTRAVENOUS at 05:12

## 2018-10-28 NOTE — PROGRESS NOTE ADULT - SUBJECTIVE AND OBJECTIVE BOX
CC: f/u for  pneumonia, fever  Patient reports still headache, brings up lots of phlegm    REVIEW OF SYSTEMS:  All other review of systems negative (Comprehensive ROS)    Antimicrobials Day #  :  azithromycin  IVPB 500 milliGRAM(s) IV Intermittent every 24 hours  day 2  azithromycin  IVPB      piperacillin/tazobactam IVPB. 3.375 Gram(s) IV Intermittent every 8 hours   day 3    Other Medications Reviewed    T(F): 100.1 (10-28-18 @ 05:29), Max: 102.8 (10-27-18 @ 19:56)  HR: 107 (10-28-18 @ 05:29)  BP: 117/84 (10-28-18 @ 05:29)  RR: 18 (10-28-18 @ 05:29)  SpO2: 93% (10-28-18 @ 05:29)  Wt(kg): --    PHYSICAL EXAM:  General: alert, no acute distress, looks nontoxic today  Eyes:  anicteric, no conjunctival injection, no discharge  Oropharynx: no lesions or injection 	  Neck: supple, without adenopathy  Lungs: basilar rales to auscultation  Heart: regular rate and rhythm; no murmur, rubs or gallops  Abdomen: soft, nondistended, nontender, without mass or organomegaly  Skin: no lesions  Extremities: no clubbing, cyanosis, or edema  Neurologic: alert, oriented, moves all extremities    LAB RESULTS:                        12.7   8  )-----------( 164      ( 28 Oct 2018 12:29 )             35.7     10-26    137  |  103  |  8   ----------------------------<  108<H>  3.7   |  19<L>  |  0.96    Ca    9.3      26 Oct 2018 17:44    TPro  7.3  /  Alb  3.9  /  TBili  1.6<H>  /  DBili  x   /  AST  21  /  ALT  24  /  AlkPhos  84  10-26    LIVER FUNCTIONS - ( 26 Oct 2018 17:44 )  Alb: 3.9 g/dL / Pro: 7.3 g/dL / ALK PHOS: 84 U/L / ALT: 24 U/L / AST: 21 U/L / GGT: x           Urinalysis Basic - ( 26 Oct 2018 18:31 )    Color: Yellow / Appearance: Slightly Turbid / S.024 / pH: x  Gluc: x / Ketone: Negative  / Bili: Negative / Urobili: Negative   Blood: x / Protein: 30 mg/dL / Nitrite: Negative   Leuk Esterase: Negative / RBC: 20 /hpf / WBC 9 /hpf   Sq Epi: x / Non Sq Epi: 11 /hpf / Bacteria: Negative      MICROBIOLOGY:  RECENT CULTURES:  10-26 @ 20:44 .Urine Clean Catch (Midstream)     <10,000 CFU/ml Normal Urogenital cyndi present      10-26 @ 20:03 .Blood Blood-Peripheral     No growth to date.      10-24 @ 03:06 .Urine Clean Catch (Midstream)     No growth  rvp neg  urine leg neg        RADIOLOGY REVIEWED:    < from: CT Chest No Cont (10.27.18 @ 15:49) >  IMPRESSION: Multifocal pneumonia as above.    < end of copied text >  < from: CT Abdomen and Pelvis No Cont (10.23.18 @ 21:46) >    IMPRESSION:     Minimal right perinephric stranding, which may be due to a recently   passed stone and/or urinary tract infection. No hydroureteronephrosis or   obstructing radiopaque urinary tract stone. Punctate nonobstructing right   renal stone.    Additional findings as described.      < end of copied text >  Assessment:  Patient recently seen in ED for flank pain, possible passed stone with pyelo but uc neg, no obstructing stone seen but was given keflex which he took. He then returned to hospital with escalating fever, cough productive or yellow green phlegm, headache found to have multifocal pneumonia. Now on zosyn for possible kelfex resistant uti/pna and zithro. Looks definitely much better today  Plan: continue zosyn and zithromax  f/u final cultures  favor us kidneys to r/o radiolucent kidney stone  favor ct head if headaches persist.   am procalcitonin

## 2018-10-28 NOTE — PROGRESS NOTE ADULT - SUBJECTIVE AND OBJECTIVE BOX
febrile/ tachycardia    REVIEW OF SYSTEMS:  GEN:  fever,    no chills  RESP: no SOB,   no cough  CVS: no chest pain,   no palpitations  GI: no abdominal pain,   no nausea,   no vomiting,   no constipation,   no diarrhea  : no dysuria,   no frequency  NEURO: no headache,   no dizziness  PSYCH: no depression,   not anxious  Derm : no rash    MEDICATIONS  (STANDING):  ALBUTerol    90 MICROgram(s) HFA Inhaler 1 Puff(s) Inhalation every 4 hours  ALBUTerol/ipratropium for Nebulization 3 milliLiter(s) Nebulizer every 6 hours  azithromycin  IVPB 500 milliGRAM(s) IV Intermittent every 24 hours  azithromycin  IVPB      benzonatate 100 milliGRAM(s) Oral three times a day  heparin  Injectable 5000 Unit(s) SubCutaneous every 12 hours  lactated ringers. 1000 milliLiter(s) (100 mL/Hr) IV Continuous <Continuous>  piperacillin/tazobactam IVPB. 3.375 Gram(s) IV Intermittent every 8 hours  tiotropium 18 MICROgram(s) Capsule 1 Capsule(s) Inhalation daily  zolpidem 5 milliGRAM(s) Oral at bedtime    MEDICATIONS  (PRN):  acetaminophen   Tablet .. 650 milliGRAM(s) Oral every 6 hours PRN Temp greater or equal to 38C (100.4F)  HYDROmorphone  Injectable 0.5 milliGRAM(s) IV Push every 4 hours PRN Moderate Pain (4 - 6)  ondansetron Injectable 4 milliGRAM(s) IV Push every 6 hours PRN Nausea and/or Vomiting      Vital Signs Last 24 Hrs  T(C): 37.8 (28 Oct 2018 05:29), Max: 39.3 (27 Oct 2018 19:56)  T(F): 100.1 (28 Oct 2018 05:29), Max: 102.8 (27 Oct 2018 19:56)  HR: 107 (28 Oct 2018 05:29) (91 - 107)  BP: 117/84 (28 Oct 2018 05:29) (105/72 - 140/92)  BP(mean): --  RR: 18 (28 Oct 2018 05:29) (18 - 18)  SpO2: 93% (28 Oct 2018 05:29) (93% - 97%)  CAPILLARY BLOOD GLUCOSE        I&O's Summary    27 Oct 2018 07:01  -  28 Oct 2018 07:00  --------------------------------------------------------  IN: 1400 mL / OUT: 1300 mL / NET: 100 mL        PHYSICAL EXAM:  HEAD:  Atraumatic, Normocephalic  NECK: Supple, No   JVD  CHEST/LUNG:   no     rales,     no,    rhonchi  HEART: Regular rate and rhythm;         murmur  ABDOMEN: Soft, Nontender, ;   EXTREMITIES:    no    edema  NEUROLOGY:  alert    LABS:                        15.3   10.1  )-----------( 193      ( 26 Oct 2018 17:44 )             43.3     10-26    137  |  103  |  8   ----------------------------<  108<H>  3.7   |  19<L>  |  0.96    Ca    9.3      26 Oct 2018 17:44    TPro  7.3  /  Alb  3.9  /  TBili  1.6<H>  /  DBili  x   /  AST  21  /  ALT  24  /  AlkPhos  84  10-26    PT/INR - ( 26 Oct 2018 19:43 )   PT: 13.8 sec;   INR: 1.26 ratio         PTT - ( 26 Oct 2018 19:43 )  PTT:27.3 sec      Urinalysis Basic - ( 26 Oct 2018 18:31 )    Color: Yellow / Appearance: Slightly Turbid / S.024 / pH: x  Gluc: x / Ketone: Negative  / Bili: Negative / Urobili: Negative   Blood: x / Protein: 30 mg/dL / Nitrite: Negative   Leuk Esterase: Negative / RBC: 20 /hpf / WBC 9 /hpf   Sq Epi: x / Non Sq Epi: 11 /hpf / Bacteria: Negative          10-26 @ 18:02  3.4  54              Consultant(s) Notes Reviewed:      Care Discussed with Consultants/Other Providers:

## 2018-10-28 NOTE — PROGRESS NOTE ADULT - ASSESSMENT
pt  with  h/o  fx  right  ankle a year  ago/  h/o  spinal  stenosis,   now admitted  with fevers/  chills/ myalgias/   fleeting flank pain at  home,  resolved now  appears  ill looking,  tachycardia, febrile/ chills/   sepsis   from   PNA/ CAP  RVP negative  urology  eval,  no intervention   Dilaudid  for  headaches  on zosyn/  zmax  legionella  negative      < from: CT Chest No Cont (10.27.18 @ 15:49) >  IMPRESSION: Multifocal pneumonia as above.  < end of copied text >

## 2018-10-29 LAB
HCT VFR BLD CALC: 37.2 % — LOW (ref 39–50)
HGB BLD-MCNC: 12.9 G/DL — LOW (ref 13–17)
MCHC RBC-ENTMCNC: 28.6 PG — SIGNIFICANT CHANGE UP (ref 27–34)
MCHC RBC-ENTMCNC: 34.7 GM/DL — SIGNIFICANT CHANGE UP (ref 32–36)
MCV RBC AUTO: 82.5 FL — SIGNIFICANT CHANGE UP (ref 80–100)
MYOGLOBIN UR-MCNC: 95 MCG/L — HIGH
PLATELET # BLD AUTO: 182 K/UL — SIGNIFICANT CHANGE UP (ref 150–400)
PROCALCITONIN SERPL-MCNC: 0.2 NG/ML — HIGH (ref 0.02–0.1)
RBC # BLD: 4.51 M/UL — SIGNIFICANT CHANGE UP (ref 4.2–5.8)
RBC # FLD: 13 % — SIGNIFICANT CHANGE UP (ref 10.3–14.5)
WBC # BLD: 7.08 K/UL — SIGNIFICANT CHANGE UP (ref 3.8–10.5)
WBC # FLD AUTO: 7.08 K/UL — SIGNIFICANT CHANGE UP (ref 3.8–10.5)

## 2018-10-29 PROCEDURE — 99232 SBSQ HOSP IP/OBS MODERATE 35: CPT

## 2018-10-29 RX ORDER — PANTOPRAZOLE SODIUM 20 MG/1
40 TABLET, DELAYED RELEASE ORAL
Qty: 0 | Refills: 0 | Status: DISCONTINUED | OUTPATIENT
Start: 2018-10-29 | End: 2018-10-30

## 2018-10-29 RX ORDER — FAMOTIDINE 10 MG/ML
20 INJECTION INTRAVENOUS ONCE
Qty: 0 | Refills: 0 | Status: COMPLETED | OUTPATIENT
Start: 2018-10-29 | End: 2018-10-29

## 2018-10-29 RX ADMIN — OXYCODONE AND ACETAMINOPHEN 1 TABLET(S): 5; 325 TABLET ORAL at 09:18

## 2018-10-29 RX ADMIN — Medication 3 MILLILITER(S): at 17:26

## 2018-10-29 RX ADMIN — OXYCODONE AND ACETAMINOPHEN 1 TABLET(S): 5; 325 TABLET ORAL at 22:02

## 2018-10-29 RX ADMIN — OXYCODONE AND ACETAMINOPHEN 1 TABLET(S): 5; 325 TABLET ORAL at 10:02

## 2018-10-29 RX ADMIN — SODIUM CHLORIDE 50 MILLILITER(S): 9 INJECTION INTRAMUSCULAR; INTRAVENOUS; SUBCUTANEOUS at 05:35

## 2018-10-29 RX ADMIN — OXYCODONE AND ACETAMINOPHEN 1 TABLET(S): 5; 325 TABLET ORAL at 23:00

## 2018-10-29 RX ADMIN — Medication 3 MILLILITER(S): at 12:04

## 2018-10-29 RX ADMIN — ONDANSETRON 4 MILLIGRAM(S): 8 TABLET, FILM COATED ORAL at 03:04

## 2018-10-29 RX ADMIN — PIPERACILLIN AND TAZOBACTAM 25 GRAM(S): 4; .5 INJECTION, POWDER, LYOPHILIZED, FOR SOLUTION INTRAVENOUS at 13:30

## 2018-10-29 RX ADMIN — FAMOTIDINE 20 MILLIGRAM(S): 10 INJECTION INTRAVENOUS at 08:19

## 2018-10-29 RX ADMIN — ONDANSETRON 4 MILLIGRAM(S): 8 TABLET, FILM COATED ORAL at 17:26

## 2018-10-29 RX ADMIN — Medication 100 MILLIGRAM(S): at 13:30

## 2018-10-29 RX ADMIN — Medication 3 MILLILITER(S): at 05:36

## 2018-10-29 RX ADMIN — Medication 100 MILLIGRAM(S): at 05:36

## 2018-10-29 RX ADMIN — AZITHROMYCIN 250 MILLIGRAM(S): 500 TABLET, FILM COATED ORAL at 19:18

## 2018-10-29 RX ADMIN — HEPARIN SODIUM 5000 UNIT(S): 5000 INJECTION INTRAVENOUS; SUBCUTANEOUS at 17:26

## 2018-10-29 RX ADMIN — PIPERACILLIN AND TAZOBACTAM 25 GRAM(S): 4; .5 INJECTION, POWDER, LYOPHILIZED, FOR SOLUTION INTRAVENOUS at 22:34

## 2018-10-29 RX ADMIN — ONDANSETRON 4 MILLIGRAM(S): 8 TABLET, FILM COATED ORAL at 09:16

## 2018-10-29 RX ADMIN — PIPERACILLIN AND TAZOBACTAM 25 GRAM(S): 4; .5 INJECTION, POWDER, LYOPHILIZED, FOR SOLUTION INTRAVENOUS at 05:39

## 2018-10-29 RX ADMIN — HEPARIN SODIUM 5000 UNIT(S): 5000 INJECTION INTRAVENOUS; SUBCUTANEOUS at 05:36

## 2018-10-29 RX ADMIN — Medication 100 MILLIGRAM(S): at 22:02

## 2018-10-29 NOTE — PROGRESS NOTE ADULT - ASSESSMENT
pt  with  h/o  fx  right  ankle a year  ago/  h/o  spinal  stenosis,   now admitted  with fevers/  chills/ myalgias/   fleeting flank pain at  home,  resolved now  appears  ill looking,  tachycardia, febrile/ chills/   sepsis , resolving   from   PNA/ CAP  RVP negative  urology  eval,  no intervention  on zosyn/  zmax  legionella  negative  slow  improvement      < from: CT Chest No Cont (10.27.18 @ 15:49) >  IMPRESSION: Multifocal pneumonia as above.  < end of copied text >

## 2018-10-29 NOTE — PROGRESS NOTE ADULT - SUBJECTIVE AND OBJECTIVE BOX
low  grade fever   slowly improvingh  REVIEW OF SYSTEMS:  GEN: no fever,    no chills  RESP: no SOB,   no cough  CVS: no chest pain,   no palpitations  GI: no abdominal pain,   no nausea,   no vomiting,   no constipation,   no diarrhea  : no dysuria,   no frequency  NEURO: no headache,   no dizziness  PSYCH: no depression,   not anxious  Derm : no rash    MEDICATIONS  (STANDING):  ALBUTerol    90 MICROgram(s) HFA Inhaler 1 Puff(s) Inhalation every 4 hours  ALBUTerol/ipratropium for Nebulization 3 milliLiter(s) Nebulizer every 6 hours  aluminum hydroxide/magnesium hydroxide/simethicone Suspension 30 milliLiter(s) Oral once  azithromycin  IVPB 500 milliGRAM(s) IV Intermittent every 24 hours  azithromycin  IVPB      benzonatate 100 milliGRAM(s) Oral three times a day  heparin  Injectable 5000 Unit(s) SubCutaneous every 12 hours  piperacillin/tazobactam IVPB. 3.375 Gram(s) IV Intermittent every 8 hours  sodium chloride 0.9%. 1000 milliLiter(s) (50 mL/Hr) IV Continuous <Continuous>  tiotropium 18 MICROgram(s) Capsule 1 Capsule(s) Inhalation daily    MEDICATIONS  (PRN):  acetaminophen   Tablet .. 650 milliGRAM(s) Oral every 6 hours PRN Temp greater or equal to 38C (100.4F)  ondansetron Injectable 4 milliGRAM(s) IV Push every 6 hours PRN Nausea and/or Vomiting  oxyCODONE    5 mG/acetaminophen 325 mG 1 Tablet(s) Oral every 6 hours PRN Moderate Pain (4 - 6)      Vital Signs Last 24 Hrs  T(C): 36.4 (29 Oct 2018 05:34), Max: 38.2 (28 Oct 2018 21:20)  T(F): 97.5 (29 Oct 2018 05:34), Max: 100.7 (28 Oct 2018 21:20)  HR: 97 (29 Oct 2018 05:34) (94 - 98)  BP: 123/68 (29 Oct 2018 05:34) (109/73 - 123/68)  BP(mean): --  RR: 18 (29 Oct 2018 05:34) (18 - 18)  SpO2: 95% (29 Oct 2018 05:34) (95% - 98%)  CAPILLARY BLOOD GLUCOSE        I&O's Summary    28 Oct 2018 07:01  -  29 Oct 2018 07:00  --------------------------------------------------------  IN: 2690 mL / OUT: 1150 mL / NET: 1540 mL        PHYSICAL EXAM:  HEAD:  Atraumatic, Normocephalic  NECK: Supple, No   JVD  CHEST/LUNG:   no     rales,     no,    rhonchi  HEART: Regular rate and rhythm;     no    murmur  ABDOMEN: Soft, Nontender, ;   EXTREMITIES:    no    edema  NEUROLOGY:  alert    LABS:                        12.7   8.26  )-----------( 164      ( 28 Oct 2018 12:29 )             35.7             CARDIAC MARKERS ( 28 Oct 2018 08:38 )  x     / x     / 33 U/L / x     / x                    Thyroid Stimulating Hormone, Serum: 1.94 uIU/mL (10-28 @ 12:29)          Consultant(s) Notes Reviewed:      Care Discussed with Consultants/Other Providers:

## 2018-10-29 NOTE — CHART NOTE - NSCHARTNOTEFT_GEN_A_CORE
Called by RN that, pt spiked Temp of 100.7 F [Orally ]@ 2120. Pt was evaluated. c/o LBP, fever. chills and decreased appetite. 43yMale who arrives with right renal colic five days ago to the ER. Was dx with UTI and rx keflex and told a stone likely passed. Thereafter developed fevers at home that were persistent and therefore came to ER yesterday. + fevers/ chills/ nausea poor appetite/ right flank pain to groin/ dec urinations dark colored urine ? red. Here with fevers 103, tachycardia, hypotension. Pt found to have multifocal pneumonia. Now on zosyn for possible kelfex resistant uti/pna and zithro..     # Sepsis 2/2 MF PNA.   - No leukocytosis.   - f/u Lactate, Procalcitonin.   - UA [10/26]: NTD.   - UCX [10/26]: NTD.  - BCX [10/26]: NTD.   - RVP [10/26]: NTD.   - Legionella [10/27]: NTD.   - CT Chest [10/27]:  PNA.   - f/u BCX sent on 10/29.   - c/w IVF.   - c/w zosyn and zithromax.   - Tylenol prn fever.   - Cooling Measures.   - Consider CT Head if HA persists.   - f/u Urology and ID recs.   - Will endorse to primary team in [place]ul[place] LYN. HANNA PATTERSON   # [id number]  Medicine PA.

## 2018-10-29 NOTE — PROGRESS NOTE ADULT - SUBJECTIVE AND OBJECTIVE BOX
CC: f/u for pneumonia    Patient reports: he c/o nausea after antibiotic infusions.His appetite is diminished .He still has a cough, mucous production.His fevers are moderating.    REVIEW OF SYSTEMS:  All other review of systems negative (Comprehensive ROS)    Antimicrobials Day #  :day 3  azithromycin  IVPB 500 milliGRAM(s) IV Intermittent every 24 hours  azithromycin  IVPB      piperacillin/tazobactam IVPB. 3.375 Gram(s) IV Intermittent every 8 hours    Other Medications Reviewed    T(F): 98.9 (10-29-18 @ 08:08), Max: 100.7 (10-28-18 @ 21:20)  HR: 85 (10-29-18 @ 08:08)  BP: 121/81 (10-29-18 @ 08:08)  RR: 18 (10-29-18 @ 08:08)  SpO2: 95% (10-29-18 @ 08:08)  Wt(kg): --    PHYSICAL EXAM:  General: alert, no acute distress  Eyes:  anicteric, no conjunctival injection, no discharge  Oropharynx: no lesions or injection 	  Neck: supple, without adenopathy  Lungs: scattered ronchi  Heart: regular rate and rhythm; no murmur, rubs or gallops  Abdomen: soft, nondistended, nontender, without mass or organomegaly  Skin: no lesions  Extremities: no clubbing, cyanosis, or edema  Neurologic: alert, oriented, moves all extremities    LAB RESULTS:                        12.9   7.08  )-----------( 182      ( 29 Oct 2018 07:43 )             37.2       PC .2        MICROBIOLOGY:  RECENT CULTURES:  10-26 @ 20:44 .Urine Clean Catch (Midstream)     <10,000 CFU/ml Normal Urogenital cyndi present      10-26 @ 20:03 .Blood Blood-Peripheral     No growth to date.    Legionella urine antigen negative      RADIOLOGY REVIEWED:  < from: CT Chest No Cont (10.27.18 @ 15:49) >  PROCEDURE:   CT of the Chest was performed without intravenous contrast.  Sagittal and coronal reformats were performed.  Axial MIP reformats were performed.    FINDINGS:    CHEST:     LUNGS AND LARGE AIRWAYS: Patent central airways.  Patchy opacities in the   left lower lobe and right upper lobe, consistent with multifocal   pneumonia.  PLEURA: No pleural effusion or pneumothorax.  VESSELS: Within normal limits.  HEART: Heart size is normal. No pericardial effusion.  MEDIASTINUM AND JESSE: No lymphadenopathy.  CHEST WALL AND LOWER NECK: Within normal limits.  VISUALIZED UPPER ABDOMEN: Within normal limits.  BONES: Within normal limits.    IMPRESSION: Multifocal pneumonia as above.    < end of copied text >

## 2018-10-29 NOTE — PROGRESS NOTE ADULT - ASSESSMENT
Community acquired pneumonia  legionella urine antigen negative, blood cultures negative so far.  RVP negative.  Fever is moderating, voiding well.  Possible recent passing of kidney stone  He is clinically improving.  Suggest:  1.would limit azithromycin to 3 days for "atypical " coverage, it may be contributing to nausea  2.continue zosyn for now  3.if he is afebrile for 1-2 days I would consider oral stepdown  4.Will defer  evaluation to urology

## 2018-10-30 ENCOUNTER — TRANSCRIPTION ENCOUNTER (OUTPATIENT)
Age: 43
End: 2018-10-30

## 2018-10-30 VITALS
TEMPERATURE: 98 F | SYSTOLIC BLOOD PRESSURE: 118 MMHG | DIASTOLIC BLOOD PRESSURE: 79 MMHG | OXYGEN SATURATION: 97 % | RESPIRATION RATE: 19 BRPM | HEART RATE: 78 BPM

## 2018-10-30 RX ORDER — PANTOPRAZOLE SODIUM 20 MG/1
1 TABLET, DELAYED RELEASE ORAL
Qty: 0 | Refills: 0 | COMMUNITY

## 2018-10-30 RX ORDER — OXYCODONE HYDROCHLORIDE 5 MG/1
1 TABLET ORAL
Qty: 12 | Refills: 0 | OUTPATIENT
Start: 2018-10-30 | End: 2018-11-01

## 2018-10-30 RX ORDER — GABAPENTIN 400 MG/1
1 CAPSULE ORAL
Qty: 0 | Refills: 0 | COMMUNITY

## 2018-10-30 RX ORDER — CEPHALEXIN 500 MG
1 CAPSULE ORAL
Qty: 0 | Refills: 0 | COMMUNITY

## 2018-10-30 RX ORDER — PANTOPRAZOLE SODIUM 20 MG/1
1 TABLET, DELAYED RELEASE ORAL
Qty: 30 | Refills: 0 | OUTPATIENT
Start: 2018-10-30 | End: 2018-11-28

## 2018-10-30 RX ORDER — CYCLOBENZAPRINE HYDROCHLORIDE 10 MG/1
1 TABLET, FILM COATED ORAL
Qty: 0 | Refills: 0 | COMMUNITY

## 2018-10-30 RX ADMIN — PIPERACILLIN AND TAZOBACTAM 25 GRAM(S): 4; .5 INJECTION, POWDER, LYOPHILIZED, FOR SOLUTION INTRAVENOUS at 05:08

## 2018-10-30 RX ADMIN — Medication 3 MILLILITER(S): at 05:11

## 2018-10-30 RX ADMIN — HEPARIN SODIUM 5000 UNIT(S): 5000 INJECTION INTRAVENOUS; SUBCUTANEOUS at 05:10

## 2018-10-30 RX ADMIN — Medication 100 MILLIGRAM(S): at 05:09

## 2018-10-30 NOTE — DISCHARGE NOTE ADULT - PATIENT PORTAL LINK FT
You can access the [facility] Patient Portal, offered by [facility], by registering with the following website: [email or web address]

## 2018-10-30 NOTE — DISCHARGE NOTE ADULT - ADDITIONAL INSTRUCTIONS
Follow up with your primary care doctor. Repeat Chest X-ray in 2 weeks. Follow up with your primary care doctor. Repeat Chest X-ray in 2 weeks.  Take the full course of antibiotics as prescribed

## 2018-10-30 NOTE — DISCHARGE NOTE ADULT - MEDICATION SUMMARY - MEDICATIONS TO STOP TAKING
I will STOP taking the medications listed below when I get home from the hospital:    Medrol Dosepak 4 mg oral tablet  -- 4 milligram(s) by mouth once a day  MEDROL PAMELLA - TAKE AS DIRECTED    Keflex 500 mg oral capsule  -- 1 cap(s) by mouth 2 times a day for UTI  -- Finish all this medication unless otherwise directed by prescriber.    Keflex 500 mg oral capsule  -- 1 cap(s) by mouth every 12 hours

## 2018-10-30 NOTE — PROGRESS NOTE ADULT - ASSESSMENT
pt  with  h/o  fx  right  ankle a year  ago/  h/o  spinal  stenosis,   now admitted  with fevers/  chills/ myalgias/   fleeting flank pain at  home,  resolved now  appears  ill looking,  tachycardia, febrile/ chills/   sepsis , resolving   from   PNA/ CAP  RVP negative  urology  eval,  no intervention  on zosyn/  zmax  legionella  negative  d/c today on augmentin  f/p pmd,  rpt cxr  in 2  weeks      < from: CT Chest No Cont (10.27.18 @ 15:49) >  IMPRESSION: Multifocal pneumonia as above.  < end of copied text >

## 2018-10-30 NOTE — DISCHARGE NOTE ADULT - CARE PLAN
Principal Discharge DX:	Sepsis  Goal:	Pt will be free of PNA  Assessment and plan of treatment:	Pneumonia is a lung infection that can cause a fever, cough, and trouble breathing.  Continue all antibiotics as ordered until complete.  Nutrition is important, eat small frequent meals.  Get lots of rest and drink fluids.  Call your health care provider upon arrival home from hospital and make a follow up appointment for one week.  If your cough worsens, you develop fever greater than 101', you have shaking chills, a fast heartbeat, trouble breathing and/or feel your are breathing much faster than usual, call your healthcare provider.  Make sure you wash your hands frequently.

## 2018-10-30 NOTE — PROGRESS NOTE ADULT - SUBJECTIVE AND OBJECTIVE BOX
doing betetr  REVIEW OF SYSTEMS:  GEN: no fever,    no chills  RESP: no SOB,   no cough  CVS: no chest pain,   no palpitations  GI: no abdominal pain,   no nausea,   no vomiting,   no constipation,   no diarrhea  : no dysuria,   no frequency  NEURO: no headache,   no dizziness  PSYCH: no depression,   not anxious  Derm : no rash    MEDICATIONS  (STANDING):  ALBUTerol    90 MICROgram(s) HFA Inhaler 1 Puff(s) Inhalation every 4 hours  ALBUTerol/ipratropium for Nebulization 3 milliLiter(s) Nebulizer every 6 hours  azithromycin  IVPB 500 milliGRAM(s) IV Intermittent every 24 hours  azithromycin  IVPB      benzonatate 100 milliGRAM(s) Oral three times a day  heparin  Injectable 5000 Unit(s) SubCutaneous every 12 hours  pantoprazole    Tablet 40 milliGRAM(s) Oral before breakfast  piperacillin/tazobactam IVPB. 3.375 Gram(s) IV Intermittent every 8 hours  tiotropium 18 MICROgram(s) Capsule 1 Capsule(s) Inhalation daily    MEDICATIONS  (PRN):  acetaminophen   Tablet .. 650 milliGRAM(s) Oral every 6 hours PRN Temp greater or equal to 38C (100.4F)  ondansetron Injectable 4 milliGRAM(s) IV Push every 6 hours PRN Nausea and/or Vomiting  oxyCODONE    5 mG/acetaminophen 325 mG 1 Tablet(s) Oral every 6 hours PRN Moderate Pain (4 - 6)      Vital Signs Last 24 Hrs  T(C): 36.7 (30 Oct 2018 05:29), Max: 37.2 (29 Oct 2018 08:08)  T(F): 98 (30 Oct 2018 05:29), Max: 98.9 (29 Oct 2018 08:08)  HR: 78 (30 Oct 2018 05:29) (78 - 85)  BP: 118/79 (30 Oct 2018 05:29) (118/79 - 136/89)  BP(mean): --  RR: 19 (30 Oct 2018 05:29) (18 - 19)  SpO2: 97% (30 Oct 2018 05:29) (95% - 97%)  CAPILLARY BLOOD GLUCOSE        I&O's Summary    29 Oct 2018 07:01  -  30 Oct 2018 07:00  --------------------------------------------------------  IN: 2010 mL / OUT: 0 mL / NET: 2010 mL        PHYSICAL EXAM:  HEAD:  Atraumatic, Normocephalic  NECK: Supple, No   JVD  CHEST/LUNG:   no     rales,     no,    rhonchi  HEART: Regular rate and rhythm;         murmur  ABDOMEN: Soft, Nontender, ;   EXTREMITIES:    no    edema  NEUROLOGY:  alert    LABS:                        12.9   7.08  )-----------( 182      ( 29 Oct 2018 07:43 )             37.2             CARDIAC MARKERS ( 28 Oct 2018 08:38 )  x     / x     / 33 U/L / x     / x                    Thyroid Stimulating Hormone, Serum: 1.94 uIU/mL (10-28 @ 12:29)          Consultant(s) Notes Reviewed:      Care Discussed with Consultants/Other Providers:

## 2018-10-30 NOTE — DISCHARGE NOTE ADULT - CARE PROVIDER_API CALL
Reyes, Elsa J (MD), Internal Medicine  [address] [place] [address]  Phone: [phone]  Fax: [phone]

## 2018-10-30 NOTE — DISCHARGE NOTE ADULT - MEDICATION SUMMARY - MEDICATIONS TO TAKE
I will START or STAY ON the medications listed below when I get home from the hospital:    oxyCODONE 5 mg oral tablet  -- 1 tab(s) by mouth every 6 hours, As Needed MDD:4  -- Caution federal law prohibits the transfer of this drug to any person other  than the person for whom it was prescribed.  It is very important that you take or use this exactly as directed.  Do not skip doses or discontinue unless directed by your doctor.  May cause drowsiness.  Alcohol may intensify this effect.  Use care when operating dangerous machinery.  This prescription cannot be refilled.  Using more of this medication than prescribed may cause serious breathing problems.    -- Indication: For pain    amoxicillin-clavulanate 875 mg-125 mg oral tablet  -- 1 tab(s) by mouth 2 times a day  -- Indication: For pna    pantoprazole 40 mg oral delayed release tablet  -- 1 tab(s) by mouth once a day (before a meal)  -- Indication: For GI

## 2018-10-30 NOTE — DISCHARGE NOTE ADULT - PLAN OF CARE
Pt will be free of PNA Pneumonia is a lung infection that can cause a fever, cough, and trouble breathing.  Continue all antibiotics as ordered until complete.  Nutrition is important, eat small frequent meals.  Get lots of rest and drink fluids.  Call your health care provider upon arrival home from hospital and make a follow up appointment for one week.  If your cough worsens, you develop fever greater than 101', you have shaking chills, a fast heartbeat, trouble breathing and/or feel your are breathing much faster than usual, call your healthcare provider.  Make sure you wash your hands frequently.

## 2018-10-30 NOTE — DISCHARGE NOTE ADULT - MEDICATION SUMMARY - MEDICATIONS TO CHANGE
I will SWITCH the dose or number of times a day I take the medications listed below when I get home from the hospital:    oxyCODONE 10 mg oral tablet  -- 1 tab(s) by mouth , As Needed

## 2018-10-30 NOTE — DISCHARGE NOTE ADULT - HOSPITAL COURSE
43 M who recently came in with a  R kidney stone, presented with fevers and malaise for 2 days.  After the patient was seen in the ED  and sent home  with keflex 2 days  ago. At home he was unable to sleep, eat food 2/2 nausea and continued to feel worse. His flank pain resolved by 2 days PTA, but it has recently started back up again.  He has been taking his abx as prescribed without much improvement and states that when he takes ibuprofen his fever abates, but returns within 2 hours. Pt has hx fx  right  ankle a year ago, h/o  spinal  stenosis,   now admitted  with fevers/  chills/ myalgias/ fleeting flank pain at  home,  resolved now  Pt with tachycardia, febrile/ chills/ sepsis , resolving   from   PNA/ CAP  RVP negative  urology  eval,  no intervention  on zosyn/  zmax  legionella  negative  slow  improvement

## 2018-10-31 LAB
CULTURE RESULTS: SIGNIFICANT CHANGE UP
CULTURE RESULTS: SIGNIFICANT CHANGE UP
SPECIMEN SOURCE: SIGNIFICANT CHANGE UP
SPECIMEN SOURCE: SIGNIFICANT CHANGE UP

## 2018-11-01 ENCOUNTER — INPATIENT (INPATIENT)
Facility: HOSPITAL | Age: 43
LOS: 4 days | Discharge: HOME | DRG: 693 | End: 2018-11-06
Attending: INTERNAL MEDICINE | Admitting: INTERNAL MEDICINE
Payer: MEDICARE

## 2018-11-01 VITALS
HEIGHT: 68 IN | OXYGEN SATURATION: 97 % | RESPIRATION RATE: 18 BRPM | SYSTOLIC BLOOD PRESSURE: 121 MMHG | HEART RATE: 73 BPM | WEIGHT: 231.93 LBS | DIASTOLIC BLOOD PRESSURE: 85 MMHG

## 2018-11-01 DIAGNOSIS — Z98.89 OTHER SPECIFIED POSTPROCEDURAL STATES: Chronic | ICD-10-CM

## 2018-11-01 DIAGNOSIS — Z96.7 PRESENCE OF OTHER BONE AND TENDON IMPLANTS: Chronic | ICD-10-CM

## 2018-11-01 DIAGNOSIS — R10.9 UNSPECIFIED ABDOMINAL PAIN: ICD-10-CM

## 2018-11-01 LAB
ALBUMIN SERPL ELPH-MCNC: 4.1 G/DL — SIGNIFICANT CHANGE UP (ref 3.3–5)
ALP SERPL-CCNC: 84 U/L — SIGNIFICANT CHANGE UP (ref 40–120)
ALT FLD-CCNC: 129 U/L — HIGH (ref 10–45)
ANION GAP SERPL CALC-SCNC: 16 MMOL/L — SIGNIFICANT CHANGE UP (ref 5–17)
AST SERPL-CCNC: 93 U/L — HIGH (ref 10–40)
BASOPHILS # BLD AUTO: 0 K/UL — SIGNIFICANT CHANGE UP (ref 0–0.2)
BASOPHILS NFR BLD AUTO: 0.2 % — SIGNIFICANT CHANGE UP (ref 0–2)
BILIRUB SERPL-MCNC: 0.8 MG/DL — SIGNIFICANT CHANGE UP (ref 0.2–1.2)
BUN SERPL-MCNC: 15 MG/DL — SIGNIFICANT CHANGE UP (ref 7–23)
CALCIUM SERPL-MCNC: 9.3 MG/DL — SIGNIFICANT CHANGE UP (ref 8.4–10.5)
CHLORIDE SERPL-SCNC: 102 MMOL/L — SIGNIFICANT CHANGE UP (ref 96–108)
CO2 SERPL-SCNC: 22 MMOL/L — SIGNIFICANT CHANGE UP (ref 22–31)
CREAT SERPL-MCNC: 1.12 MG/DL — SIGNIFICANT CHANGE UP (ref 0.5–1.3)
EOSINOPHIL # BLD AUTO: 0.2 K/UL — SIGNIFICANT CHANGE UP (ref 0–0.5)
EOSINOPHIL NFR BLD AUTO: 1.3 % — SIGNIFICANT CHANGE UP (ref 0–6)
GAS PNL BLDV: SIGNIFICANT CHANGE UP
GLUCOSE SERPL-MCNC: 92 MG/DL — SIGNIFICANT CHANGE UP (ref 70–99)
HCT VFR BLD CALC: 43 % — SIGNIFICANT CHANGE UP (ref 39–50)
HGB BLD-MCNC: 15 G/DL — SIGNIFICANT CHANGE UP (ref 13–17)
LYMPHOCYTES # BLD AUTO: 1.9 K/UL — SIGNIFICANT CHANGE UP (ref 1–3.3)
LYMPHOCYTES # BLD AUTO: 16.2 % — SIGNIFICANT CHANGE UP (ref 13–44)
MCHC RBC-ENTMCNC: 29 PG — SIGNIFICANT CHANGE UP (ref 27–34)
MCHC RBC-ENTMCNC: 34.8 GM/DL — SIGNIFICANT CHANGE UP (ref 32–36)
MCV RBC AUTO: 83.3 FL — SIGNIFICANT CHANGE UP (ref 80–100)
MONOCYTES # BLD AUTO: 0.7 K/UL — SIGNIFICANT CHANGE UP (ref 0–0.9)
MONOCYTES NFR BLD AUTO: 5.9 % — SIGNIFICANT CHANGE UP (ref 2–14)
NEUTROPHILS # BLD AUTO: 8.8 K/UL — HIGH (ref 1.8–7.4)
NEUTROPHILS NFR BLD AUTO: 76.4 % — SIGNIFICANT CHANGE UP (ref 43–77)
PLATELET # BLD AUTO: 290 K/UL — SIGNIFICANT CHANGE UP (ref 150–400)
POTASSIUM SERPL-MCNC: 3.2 MMOL/L — LOW (ref 3.5–5.3)
POTASSIUM SERPL-SCNC: 3.2 MMOL/L — LOW (ref 3.5–5.3)
PROT SERPL-MCNC: 7.5 G/DL — SIGNIFICANT CHANGE UP (ref 6–8.3)
RBC # BLD: 5.17 M/UL — SIGNIFICANT CHANGE UP (ref 4.2–5.8)
RBC # FLD: 12.1 % — SIGNIFICANT CHANGE UP (ref 10.3–14.5)
SODIUM SERPL-SCNC: 140 MMOL/L — SIGNIFICANT CHANGE UP (ref 135–145)
WBC # BLD: 11.6 K/UL — HIGH (ref 3.8–10.5)
WBC # FLD AUTO: 11.6 K/UL — HIGH (ref 3.8–10.5)

## 2018-11-01 PROCEDURE — 99284 EMERGENCY DEPT VISIT MOD MDM: CPT | Mod: GC

## 2018-11-01 PROCEDURE — 71046 X-RAY EXAM CHEST 2 VIEWS: CPT | Mod: 26

## 2018-11-01 PROCEDURE — 74176 CT ABD & PELVIS W/O CONTRAST: CPT | Mod: 26

## 2018-11-01 RX ORDER — PANTOPRAZOLE SODIUM 20 MG/1
40 TABLET, DELAYED RELEASE ORAL
Qty: 0 | Refills: 0 | Status: DISCONTINUED | OUTPATIENT
Start: 2018-11-01 | End: 2018-11-06

## 2018-11-01 RX ORDER — HYDROMORPHONE HYDROCHLORIDE 2 MG/ML
0.5 INJECTION INTRAMUSCULAR; INTRAVENOUS; SUBCUTANEOUS EVERY 6 HOURS
Qty: 0 | Refills: 0 | Status: DISCONTINUED | OUTPATIENT
Start: 2018-11-01 | End: 2018-11-04

## 2018-11-01 RX ORDER — SODIUM CHLORIDE 9 MG/ML
1000 INJECTION INTRAMUSCULAR; INTRAVENOUS; SUBCUTANEOUS
Qty: 0 | Refills: 0 | Status: DISCONTINUED | OUTPATIENT
Start: 2018-11-01 | End: 2018-11-06

## 2018-11-01 RX ORDER — SODIUM CHLORIDE 9 MG/ML
1000 INJECTION INTRAMUSCULAR; INTRAVENOUS; SUBCUTANEOUS ONCE
Qty: 0 | Refills: 0 | Status: COMPLETED | OUTPATIENT
Start: 2018-11-01 | End: 2018-11-01

## 2018-11-01 RX ORDER — HYDROMORPHONE HYDROCHLORIDE 2 MG/ML
0.2 INJECTION INTRAMUSCULAR; INTRAVENOUS; SUBCUTANEOUS EVERY 4 HOURS
Qty: 0 | Refills: 0 | Status: DISCONTINUED | OUTPATIENT
Start: 2018-11-01 | End: 2018-11-04

## 2018-11-01 RX ORDER — ONDANSETRON 8 MG/1
4 TABLET, FILM COATED ORAL THREE TIMES A DAY
Qty: 0 | Refills: 0 | Status: DISCONTINUED | OUTPATIENT
Start: 2018-11-01 | End: 2018-11-06

## 2018-11-01 RX ORDER — KETOROLAC TROMETHAMINE 30 MG/ML
15 SYRINGE (ML) INJECTION ONCE
Qty: 0 | Refills: 0 | Status: DISCONTINUED | OUTPATIENT
Start: 2018-11-01 | End: 2018-11-01

## 2018-11-01 RX ORDER — TAMSULOSIN HYDROCHLORIDE 0.4 MG/1
0.4 CAPSULE ORAL AT BEDTIME
Qty: 0 | Refills: 0 | Status: DISCONTINUED | OUTPATIENT
Start: 2018-11-01 | End: 2018-11-06

## 2018-11-01 RX ORDER — ZOLPIDEM TARTRATE 10 MG/1
5 TABLET ORAL AT BEDTIME
Qty: 0 | Refills: 0 | Status: DISCONTINUED | OUTPATIENT
Start: 2018-11-01 | End: 2018-11-06

## 2018-11-01 RX ORDER — ENOXAPARIN SODIUM 100 MG/ML
40 INJECTION SUBCUTANEOUS DAILY
Qty: 0 | Refills: 0 | Status: DISCONTINUED | OUTPATIENT
Start: 2018-11-01 | End: 2018-11-06

## 2018-11-01 RX ORDER — MORPHINE SULFATE 50 MG/1
4 CAPSULE, EXTENDED RELEASE ORAL ONCE
Qty: 0 | Refills: 0 | Status: DISCONTINUED | OUTPATIENT
Start: 2018-11-01 | End: 2018-11-01

## 2018-11-01 RX ORDER — ONDANSETRON 8 MG/1
4 TABLET, FILM COATED ORAL ONCE
Qty: 0 | Refills: 0 | Status: COMPLETED | OUTPATIENT
Start: 2018-11-01 | End: 2018-11-01

## 2018-11-01 RX ORDER — HYDROMORPHONE HYDROCHLORIDE 2 MG/ML
0.5 INJECTION INTRAMUSCULAR; INTRAVENOUS; SUBCUTANEOUS ONCE
Qty: 0 | Refills: 0 | Status: DISCONTINUED | OUTPATIENT
Start: 2018-11-01 | End: 2018-11-01

## 2018-11-01 RX ADMIN — HYDROMORPHONE HYDROCHLORIDE 0.5 MILLIGRAM(S): 2 INJECTION INTRAMUSCULAR; INTRAVENOUS; SUBCUTANEOUS at 18:52

## 2018-11-01 RX ADMIN — HYDROMORPHONE HYDROCHLORIDE 0.5 MILLIGRAM(S): 2 INJECTION INTRAMUSCULAR; INTRAVENOUS; SUBCUTANEOUS at 22:00

## 2018-11-01 RX ADMIN — ONDANSETRON 4 MILLIGRAM(S): 8 TABLET, FILM COATED ORAL at 21:29

## 2018-11-01 RX ADMIN — HYDROMORPHONE HYDROCHLORIDE 0.5 MILLIGRAM(S): 2 INJECTION INTRAMUSCULAR; INTRAVENOUS; SUBCUTANEOUS at 21:28

## 2018-11-01 RX ADMIN — HYDROMORPHONE HYDROCHLORIDE 0.5 MILLIGRAM(S): 2 INJECTION INTRAMUSCULAR; INTRAVENOUS; SUBCUTANEOUS at 17:46

## 2018-11-01 RX ADMIN — Medication 1 TABLET(S): at 21:22

## 2018-11-01 RX ADMIN — ONDANSETRON 4 MILLIGRAM(S): 8 TABLET, FILM COATED ORAL at 17:24

## 2018-11-01 RX ADMIN — ENOXAPARIN SODIUM 40 MILLIGRAM(S): 100 INJECTION SUBCUTANEOUS at 23:10

## 2018-11-01 RX ADMIN — Medication 15 MILLIGRAM(S): at 17:46

## 2018-11-01 RX ADMIN — TAMSULOSIN HYDROCHLORIDE 0.4 MILLIGRAM(S): 0.4 CAPSULE ORAL at 21:43

## 2018-11-01 RX ADMIN — Medication 15 MILLIGRAM(S): at 17:24

## 2018-11-01 RX ADMIN — SODIUM CHLORIDE 70 MILLILITER(S): 9 INJECTION INTRAMUSCULAR; INTRAVENOUS; SUBCUTANEOUS at 21:29

## 2018-11-01 RX ADMIN — SODIUM CHLORIDE 1000 MILLILITER(S): 9 INJECTION INTRAMUSCULAR; INTRAVENOUS; SUBCUTANEOUS at 17:24

## 2018-11-01 NOTE — ED PROVIDER NOTE - OBJECTIVE STATEMENT
42yo M recent admission for multifocal PNA presents with R flank pain. 10/23, was seen in the ER and found to have R sided kidney stone, discharged home. Started having fevers 10/24 and was seen in the ER, admitted for multifocal PNA. Discharged 2 days ago, now back with continued extreme R sided flank pain on the same area. denies f/c at this time.

## 2018-11-01 NOTE — ED ADULT NURSE NOTE - OBJECTIVE STATEMENT
43 y.o male pmh chronic back pain and back surgeries- ambulates with use of cane, kidney stones, and recently treated in hospital for multifocal pna and d/c home 2 days ago presenting to ED from home by EMS c.o 8/10 R. flank pian and discomfort since 4 am this morning that is getting worse. pt describes the pain as sharp and stabbing in his r. flank with no radiation. denies any vomiting, abdominal pain, fever, testicular pain, pain upon urination, cough, cp or sob. pt states he is currently still taking Augmentin PO since being d/c 2 days ago. c/o feeling nauseas. labs an line obtained. pt pending ct scan and admission. VS stable. safety and fall precautions maintained.

## 2018-11-01 NOTE — ED PROVIDER NOTE - NS ED ROS FT
ROS: denies HA, weakness, dizziness, fevers/chills, nausea/vomiting, chest pain, SOB, diaphoresis, abdominal pain, diarrhea, joint pain, neuro deficits, dysuria/hematuria, rash    +R flank pain

## 2018-11-01 NOTE — ED PROVIDER NOTE - MEDICAL DECISION MAKING DETAILS
ddx pyelo vs kidneys tone 44yo M recent history of multifocal PNA, kidney stones recently discharged 2 days ago presents with R flank pain. no fevers, chills. Will obtain blood work, pain meds, CT stone hunt and possible readmission to hospital. ZR 42yo M recent history of multifocal PNA, kidney stones recently discharged 2 days ago presents with R flank pain. no fevers, chills. looks uncomfortable ,no cva tenderness ,probably kidney stone, Will obtain blood work, pain meds, CT stone hunt and possible readmission to hospital. ZR

## 2018-11-01 NOTE — H&P ADULT - ASSESSMENT
pt  with  h/o  fx  right  ankle a year  ago/  h/o  spinal  stenosis,,  right  foot  drop,  renal stone   recent admission for  CAP.  still on Augmentin ,  need  to  complete  course    now admitted  with   right flank pain   denies    fevers/  chills/ dysuria/ cp/sob   urology  eval,   pain meds   in er  with  wbc of 11.00    awaiting  cxr/  ct  abdomen/  stone hunt  labs  in am  ID  to  f/p        < from: CT Abdomen and Pelvis No Cont (10.23.18 @ 21:46) >  ADRENALS: Unremarkable.  KIDNEYS/URETERS: Minimal right perinephric stranding without   hydroureteronephrosis or obstructing radiopaque urinary tract stone.   Punctate 0.2 cm nonobstructing right lower pole renal stone. Duplicated   right renal collecting system.  < end of copied text >    l  < from: CT Chest No Cont (10.27.18 @ 15:49) >  IMPRESSION: Multifocal pneumonia as above.  < end of copied text > pt  with  h/o  fx  right  ankle a year  ago/  h/o  spinal  stenosis,,  right  foot  drop,  renal stone   recent admission for  CAP.  still on Augmentin ,  need  to  complete  course    now admitted  with   right flank pain   denies    fevers/  chills/ dysuria/ cp/sob   urology  eval,   pain meds   in er  with  wbc of 11.00    awaiting  cxr/    ct  abdomen  noted  labs  in am      < from: CT Abdomen and Pelvis No Cont (11.01.18 @ 18:11) >  IMPRESSION: 3 mm calculus in the distal right ureter with associated mild   right hydronephrosis.  < end of copied text >      < from: CT Abdomen and Pelvis No Cont (10.23.18 @ 21:46) >  ADRENALS: Unremarkable.  KIDNEYS/URETERS: Minimal right perinephric stranding without   hydroureteronephrosis or obstructing radiopaque urinary tract stone.   Punctate 0.2 cm nonobstructing right lower pole renal stone. Duplicated   right renal collecting system.  < end of copied text >  l  < from: CT Chest No Cont (10.27.18 @ 15:49) >  IMPRESSION: Multifocal pneumonia as above.  < end of copied text >

## 2018-11-01 NOTE — H&P ADULT - NSHPPHYSICALEXAM_GEN_ALL_CORE
PHYSICAL EXAMINATION:  Vital Signs Last 24 Hrs  T(C): --  T(F): --  HR: 73 (01 Nov 2018 16:18) (73 - 73)  BP: 121/85 (01 Nov 2018 16:18) (121/85 - 121/85)  BP(mean): --  RR: 18 (01 Nov 2018 16:18) (18 - 18)  SpO2: 97% (01 Nov 2018 16:18) (97% - 97%)  CAPILLARY BLOOD GLUCOSE            GENERAL: NAD, well-groomed,  HEAD:  atraumatic, normocephalic  EYES: sclera anicteric  ENMT: mucous membranes moist  NECK: supple, No JVD  CHEST/LUNG: clear to auscultation bilaterally;    no      rales   ,   no rhonchi,   HEART: normal S1, S2  ABDOMEN: BS+, soft, ND,  right flank  tenderness   EXTREMITIES:    no    edema    b/l LEs  NEURO: awake, ,     moves all extremities  SKIN: no     rash

## 2018-11-01 NOTE — H&P ADULT - NSHPLABSRESULTS_GEN_ALL_CORE
LABS:                        15.0   11.6  )-----------( 290      ( 01 Nov 2018 17:39 )             43.0     11-01    140  |  102  |  15  ----------------------------<  92  3.2<L>   |  22  |  1.12    Ca    9.3      01 Nov 2018 17:39    TPro  7.5  /  Alb  4.1  /  TBili  0.8  /  DBili  x   /  AST  93<H>  /  ALT  129<H>  /  AlkPhos  84  11-01 11-01 @ 18:18  Performed In Lab  --      Thyroid Stimulating Hormone, Serum: 1.94 uIU/mL (10-28 @ 12:29)

## 2018-11-01 NOTE — H&P ADULT - HISTORY OF PRESENT ILLNESS
High Risk Travel:  International Travel? No(1)    43y Male complaining of R flank pain	    42yo M recent admission for multifocal PNA    presents with R flank pain. h/o right foot  drop/  lumbar diskectomy/ ankle  surgery on   10/23,  pt was seen in the ER and found to have R sided kidney stone, discharged home.  , then on  10/24,    pt was   admitted for multifocal PNA.  Discharged 2 days ago, now  in er   with   extreme R   flank pain   was  seen by urology on last  admission  no intervention

## 2018-11-01 NOTE — ED ADULT NURSE NOTE - NSIMPLEMENTINTERV_GEN_ALL_ED
Implemented All Universal Safety Interventions:  [place] to call system. Call bell, personal items and telephone within reach. Instruct patient to call for assistance. Room bathroom lighting operational. Non-slip footwear when patient is off stretcher. Physically safe environment: no spills, clutter or unnecessary equipment. Stretcher in lowest position, wheels locked, appropriate side rails in place.

## 2018-11-01 NOTE — ED PROVIDER NOTE - PROGRESS NOTE DETAILS
Pt in pain, toradol given dilaudid given. Pt does not tolerate morphine. Consulted Urology. Will admit for pain control under Dr. Bria Powers

## 2018-11-01 NOTE — ED PROVIDER NOTE - PHYSICAL EXAMINATION
Gen: Ill appearing, in pain  Head: NCAT  HEENT: PERRL, MMM, normal conjunctiva, anicteric, neck supple  Lung: CTAB, no adventitious sounds  CV: RRR, no murmurs, rubs or gallops  Abd: soft, NTND, no rebound or guarding, +CVAT R  MSK: No edema, no visible deformities  Neuro: No focal neurologic deficits. CN II-XII grossly intact. 5/5 strength and normal sensation in all extremities.  Skin: Warm and dry, no evidence of rash  Psych: normal mood and affect

## 2018-11-02 RX ORDER — POTASSIUM CHLORIDE 20 MEQ
40 PACKET (EA) ORAL ONCE
Qty: 0 | Refills: 0 | Status: COMPLETED | OUTPATIENT
Start: 2018-11-02 | End: 2018-11-02

## 2018-11-02 RX ORDER — HYDROMORPHONE HYDROCHLORIDE 2 MG/ML
0.5 INJECTION INTRAMUSCULAR; INTRAVENOUS; SUBCUTANEOUS ONCE
Qty: 0 | Refills: 0 | Status: DISCONTINUED | OUTPATIENT
Start: 2018-11-02 | End: 2018-11-03

## 2018-11-02 RX ADMIN — HYDROMORPHONE HYDROCHLORIDE 0.5 MILLIGRAM(S): 2 INJECTION INTRAMUSCULAR; INTRAVENOUS; SUBCUTANEOUS at 12:32

## 2018-11-02 RX ADMIN — TAMSULOSIN HYDROCHLORIDE 0.4 MILLIGRAM(S): 0.4 CAPSULE ORAL at 21:41

## 2018-11-02 RX ADMIN — HYDROMORPHONE HYDROCHLORIDE 0.5 MILLIGRAM(S): 2 INJECTION INTRAMUSCULAR; INTRAVENOUS; SUBCUTANEOUS at 04:05

## 2018-11-02 RX ADMIN — HYDROMORPHONE HYDROCHLORIDE 0.5 MILLIGRAM(S): 2 INJECTION INTRAMUSCULAR; INTRAVENOUS; SUBCUTANEOUS at 19:04

## 2018-11-02 RX ADMIN — Medication 1 TABLET(S): at 21:41

## 2018-11-02 RX ADMIN — HYDROMORPHONE HYDROCHLORIDE 0.2 MILLIGRAM(S): 2 INJECTION INTRAMUSCULAR; INTRAVENOUS; SUBCUTANEOUS at 10:28

## 2018-11-02 RX ADMIN — Medication 1 TABLET(S): at 10:23

## 2018-11-02 RX ADMIN — PANTOPRAZOLE SODIUM 40 MILLIGRAM(S): 20 TABLET, DELAYED RELEASE ORAL at 10:23

## 2018-11-02 RX ADMIN — ENOXAPARIN SODIUM 40 MILLIGRAM(S): 100 INJECTION SUBCUTANEOUS at 10:22

## 2018-11-02 RX ADMIN — Medication 40 MILLIEQUIVALENT(S): at 02:55

## 2018-11-02 RX ADMIN — HYDROMORPHONE HYDROCHLORIDE 0.5 MILLIGRAM(S): 2 INJECTION INTRAMUSCULAR; INTRAVENOUS; SUBCUTANEOUS at 19:30

## 2018-11-02 RX ADMIN — ONDANSETRON 4 MILLIGRAM(S): 8 TABLET, FILM COATED ORAL at 19:29

## 2018-11-02 RX ADMIN — HYDROMORPHONE HYDROCHLORIDE 0.2 MILLIGRAM(S): 2 INJECTION INTRAMUSCULAR; INTRAVENOUS; SUBCUTANEOUS at 10:58

## 2018-11-02 RX ADMIN — ONDANSETRON 4 MILLIGRAM(S): 8 TABLET, FILM COATED ORAL at 10:23

## 2018-11-02 RX ADMIN — ONDANSETRON 4 MILLIGRAM(S): 8 TABLET, FILM COATED ORAL at 04:05

## 2018-11-02 RX ADMIN — HYDROMORPHONE HYDROCHLORIDE 0.5 MILLIGRAM(S): 2 INJECTION INTRAMUSCULAR; INTRAVENOUS; SUBCUTANEOUS at 13:02

## 2018-11-02 NOTE — CONSULT NOTE ADULT - SUBJECTIVE AND OBJECTIVE BOX
Patient is a 43y old  Male who presents with a chief complaint of flank pain (02 Nov 2018 07:40)      HPI:  High Risk Travel:  International Travel? No(1)    43y Male complaining of R flank pain	    44yo M recent admission for multifocal PNA    presents with R flank pain. h/o right foot  drop/  lumbar diskectomy/ ankle  surgery on   10/23,  pt was seen in the ER and found to have non obstructing 2mm R sided kidney stone, discharged home.  , Then on  10/24,    pt was   admitted for multifocal PNA.  Discharged 2 days ago on atb and returned to er   with   extreme R   flank pain.     CT reviewed-stone fell down into ureter-currently mild hydro with 3 mm stone at right UVJ    Has had no fever, no vomiting-currently pain controlled-eating dinner at time of visit    PAST MEDICAL & SURGICAL HISTORY:  No pertinent past medical history  S/P ORIF (open reduction internal fixation) fracture: of the right ankle in Jan 2016  H/O lumbar discectomy      REVIEW OF SYSTEMS:    CONSTITUTIONAL: No weakness, fevers or chills  HEENT: No visual changes;  No vertigo or throat pain   ENDO: No sweating, no palpitations  NECK: No pain or stiffness  MUSCULOSKELETAL: No back pain, no joint pain  RESPIRATORY: No cough, wheezing, hemoptysis; No shortness of breath  CARDIOVASCULAR: No chest pain or palpitations  GASTROINTESTINAL: No abdominal or epigastric pain. No nausea, vomiting, or hematemesis; No diarrhea or constipation. No melena or hematochezia.  NEUROLOGICAL: No numbness or weakness  PSYCH: No depression, no mood changes  SKIN: No itching, burning, rashes, or lesions   All other review of systems is negative unless indicated above.    MEDICATIONS  (STANDING):  amoxicillin  875 milliGRAM(s)/clavulanate 1 Tablet(s) Oral two times a day  enoxaparin Injectable 40 milliGRAM(s) SubCutaneous daily  HYDROmorphone  Injectable 0.5 milliGRAM(s) IV Push once  pantoprazole    Tablet 40 milliGRAM(s) Oral before breakfast  sodium chloride 0.9%. 1000 milliLiter(s) (70 mL/Hr) IV Continuous <Continuous>  tamsulosin 0.4 milliGRAM(s) Oral at bedtime  zolpidem 5 milliGRAM(s) Oral at bedtime    MEDICATIONS  (PRN):  HYDROmorphone  Injectable 0.5 milliGRAM(s) IV Push every 6 hours PRN Severe Pain (7 - 10)  HYDROmorphone  Injectable 0.2 milliGRAM(s) IV Push every 4 hours PRN Moderate Pain (4 - 6)  ondansetron Injectable 4 milliGRAM(s) IV Push three times a day PRN Nausea and/or Vomiting      Allergies    No Known Allergies    Intolerances        SOCIAL HISTORY: No illicit drug use    FAMILY HISTORY:      Vital Signs Last 24 Hrs  T(C): 37.2 (02 Nov 2018 17:34), Max: 37.2 (02 Nov 2018 17:34)  T(F): 99 (02 Nov 2018 17:34), Max: 99 (02 Nov 2018 17:34)  HR: 88 (02 Nov 2018 17:34) (72 - 89)  BP: 122/86 (02 Nov 2018 17:34) (122/78 - 135/83)  BP(mean): --  RR: 18 (02 Nov 2018 17:34) (18 - 18)  SpO2: 96% (02 Nov 2018 17:34) (94% - 97%)    PHYSICAL EXAM:    Constitutional: NAD, well-developed  HEENT: CHENG, EOMI, Normal Hearing, MMM  Neck: No JVD  Back: No CVA tenderness  Respiratory: No accessory respiratory muscle use  Abd: Soft, NT/ND  :   Neurological: A/O x 3, no focal deficits  Psychiatric: Normal mood, normal affect  Skin: No rashes    I&O's Summary    01 Nov 2018 07:01  -  02 Nov 2018 07:00  --------------------------------------------------------  IN: 210 mL / OUT: 0 mL / NET: 210 mL    02 Nov 2018 07:01  -  02 Nov 2018 19:22  --------------------------------------------------------  IN: 300 mL / OUT: 850 mL / NET: -550 mL        LABS:                        15.0   11.6  )-----------( 290      ( 01 Nov 2018 17:39 )             43.0     11-01    140  |  102  |  15  ----------------------------<  92  3.2<L>   |  22  |  1.12    Ca    9.3      01 Nov 2018 17:39    TPro  7.5  /  Alb  4.1  /  TBili  0.8  /  DBili  x   /  AST  93<H>  /  ALT  129<H>  /  AlkPhos  84  11-01        Urine Culture: negative last week    RADIOLOGY & ADDITIONAL STUDIES:CT reviewed-see above

## 2018-11-02 NOTE — PROGRESS NOTE ADULT - ASSESSMENT
pt  with  h/o  fx  right  ankle a year  ago/  h/o  spinal  stenosis,,  right  foot  drop,  renal stone   recent admission for  CAP.  still on Augmentin ,  need  to  complete  course    now admitted  with   right flank pain   denies    fevers/  chills/ dysuria/ cp/sob   urology  eval,   pain meds   in er  with  wbc of 11.00    ct  abdomen  noted  pain betetr, with pain meds        < from: CT Abdomen and Pelvis No Cont (11.01.18 @ 18:11) >  IMPRESSION: 3 mm calculus in the distal right ureter with associated mild   right hydronephrosis.  < end of copied text >      < from: CT Abdomen and Pelvis No Cont (10.23.18 @ 21:46) >  ADRENALS: Unremarkable.  KIDNEYS/URETERS: Minimal right perinephric stranding without   hydroureteronephrosis or obstructing radiopaque urinary tract stone.   Punctate 0.2 cm nonobstructing right lower pole renal stone. Duplicated   right renal collecting system.  < end of copied text >  l  < from: CT Chest No Cont (10.27.18 @ 15:49) >  IMPRESSION: Multifocal pneumonia as above.  < end of copied text >

## 2018-11-02 NOTE — CONSULT NOTE ADULT - ASSESSMENT
42 yo with distal stone on right superimposed on recent PNA.    would continue flomax and analgesia  observe for now  strain urine  likely d/c 1-2 days-to f/u in office for u/s and complete stone w/u

## 2018-11-02 NOTE — CHART NOTE - NSCHARTNOTEFT_GEN_A_CORE
Attending was made aware  urology has not consulted, states that she spoke with the PA this morning and they are suppose to consult for kidney stone/flank pain. [facility] paged x 2 and no call back. Spoke with Urology PA who stated that patient wanted to see DR Conklin.  Also stated that DR Goldberg and Dr Conklin was made aware. States they will sonsult today or tomorrow

## 2018-11-02 NOTE — PROGRESS NOTE ADULT - SUBJECTIVE AND OBJECTIVE BOX
afebrile, right flank pain, less    REVIEW OF SYSTEMS:  GEN: no fever,    no chills  RESP: no SOB,   no cough  CVS: no chest pain,   no palpitations  GI: no abdominal pain,   no nausea,   no vomiting,   no constipation,   no diarrhea  : no dysuria,   no frequency  NEURO: no headache,   no dizziness  PSYCH: no depression,   not anxious  Derm : no rash    MEDICATIONS  (STANDING):  amoxicillin  875 milliGRAM(s)/clavulanate 1 Tablet(s) Oral two times a day  enoxaparin Injectable 40 milliGRAM(s) SubCutaneous daily  HYDROmorphone  Injectable 0.5 milliGRAM(s) IV Push once  pantoprazole    Tablet 40 milliGRAM(s) Oral before breakfast  sodium chloride 0.9%. 1000 milliLiter(s) (70 mL/Hr) IV Continuous <Continuous>  tamsulosin 0.4 milliGRAM(s) Oral at bedtime  zolpidem 5 milliGRAM(s) Oral at bedtime    MEDICATIONS  (PRN):  HYDROmorphone  Injectable 0.5 milliGRAM(s) IV Push every 6 hours PRN Severe Pain (7 - 10)  HYDROmorphone  Injectable 0.2 milliGRAM(s) IV Push every 4 hours PRN Moderate Pain (4 - 6)  ondansetron Injectable 4 milliGRAM(s) IV Push three times a day PRN Nausea and/or Vomiting      Vital Signs Last 24 Hrs  T(C): 37.1 (02 Nov 2018 05:00), Max: 37.1 (02 Nov 2018 05:00)  T(F): 98.7 (02 Nov 2018 05:00), Max: 98.7 (02 Nov 2018 05:00)  HR: 72 (02 Nov 2018 05:00) (72 - 83)  BP: 122/78 (02 Nov 2018 05:00) (121/85 - 135/83)  BP(mean): --  RR: 18 (02 Nov 2018 05:00) (18 - 18)  SpO2: 97% (02 Nov 2018 05:00) (94% - 97%)  CAPILLARY BLOOD GLUCOSE        I&O's Summary      PHYSICAL EXAM:  HEAD:  Atraumatic, Normocephalic  NECK: Supple, No   JVD  CHEST/LUNG:   no     rales,     no,    rhonchi  HEART: Regular rate and rhythm;         murmur  ABDOMEN: Soft, Nontender, ;   EXTREMITIES:   no     edema  NEUROLOGY:  alert    LABS:                        15.0   11.6  )-----------( 290      ( 01 Nov 2018 17:39 )             43.0     11-01    140  |  102  |  15  ----------------------------<  92  3.2<L>   |  22  |  1.12    Ca    9.3      01 Nov 2018 17:39    TPro  7.5  /  Alb  4.1  /  TBili  0.8  /  DBili  x   /  AST  93<H>  /  ALT  129<H>  /  AlkPhos  84  11-01 11-01 @ 18:18  3.1  <50      Thyroid Stimulating Hormone, Serum: 1.94 uIU/mL (10-28 @ 12:29)          Consultant(s) Notes Reviewed:      Care Discussed with Consultants/Other Providers:

## 2018-11-03 DIAGNOSIS — N20.1 CALCULUS OF URETER: ICD-10-CM

## 2018-11-03 DIAGNOSIS — R10.9 UNSPECIFIED ABDOMINAL PAIN: ICD-10-CM

## 2018-11-03 LAB
APPEARANCE UR: CLEAR — SIGNIFICANT CHANGE UP
BILIRUB UR-MCNC: NEGATIVE — SIGNIFICANT CHANGE UP
COLOR SPEC: SIGNIFICANT CHANGE UP
CULTURE RESULTS: NO GROWTH — SIGNIFICANT CHANGE UP
CULTURE RESULTS: SIGNIFICANT CHANGE UP
CULTURE RESULTS: SIGNIFICANT CHANGE UP
DIFF PNL FLD: NEGATIVE — SIGNIFICANT CHANGE UP
GLUCOSE UR QL: NEGATIVE — SIGNIFICANT CHANGE UP
KETONES UR-MCNC: NEGATIVE — SIGNIFICANT CHANGE UP
LEUKOCYTE ESTERASE UR-ACNC: NEGATIVE — SIGNIFICANT CHANGE UP
NITRITE UR-MCNC: NEGATIVE — SIGNIFICANT CHANGE UP
PH UR: 6.5 — SIGNIFICANT CHANGE UP (ref 5–8)
PROT UR-MCNC: NEGATIVE — SIGNIFICANT CHANGE UP
SP GR SPEC: 1.01 — SIGNIFICANT CHANGE UP (ref 1.01–1.02)
SPECIMEN SOURCE: SIGNIFICANT CHANGE UP
UROBILINOGEN FLD QL: NEGATIVE — SIGNIFICANT CHANGE UP

## 2018-11-03 RX ORDER — POLYETHYLENE GLYCOL 3350 17 G/17G
17 POWDER, FOR SOLUTION ORAL DAILY
Qty: 0 | Refills: 0 | Status: DISCONTINUED | OUTPATIENT
Start: 2018-11-03 | End: 2018-11-03

## 2018-11-03 RX ORDER — DOCUSATE SODIUM 100 MG
100 CAPSULE ORAL THREE TIMES A DAY
Qty: 0 | Refills: 0 | Status: DISCONTINUED | OUTPATIENT
Start: 2018-11-03 | End: 2018-11-03

## 2018-11-03 RX ORDER — SENNA PLUS 8.6 MG/1
2 TABLET ORAL AT BEDTIME
Qty: 0 | Refills: 0 | Status: DISCONTINUED | OUTPATIENT
Start: 2018-11-03 | End: 2018-11-06

## 2018-11-03 RX ORDER — HYDROMORPHONE HYDROCHLORIDE 2 MG/ML
0.5 INJECTION INTRAMUSCULAR; INTRAVENOUS; SUBCUTANEOUS ONCE
Qty: 0 | Refills: 0 | Status: DISCONTINUED | OUTPATIENT
Start: 2018-11-03 | End: 2018-11-03

## 2018-11-03 RX ADMIN — TAMSULOSIN HYDROCHLORIDE 0.4 MILLIGRAM(S): 0.4 CAPSULE ORAL at 21:19

## 2018-11-03 RX ADMIN — HYDROMORPHONE HYDROCHLORIDE 0.5 MILLIGRAM(S): 2 INJECTION INTRAMUSCULAR; INTRAVENOUS; SUBCUTANEOUS at 14:30

## 2018-11-03 RX ADMIN — Medication 1 TABLET(S): at 11:26

## 2018-11-03 RX ADMIN — HYDROMORPHONE HYDROCHLORIDE 0.5 MILLIGRAM(S): 2 INJECTION INTRAMUSCULAR; INTRAVENOUS; SUBCUTANEOUS at 06:25

## 2018-11-03 RX ADMIN — HYDROMORPHONE HYDROCHLORIDE 0.5 MILLIGRAM(S): 2 INJECTION INTRAMUSCULAR; INTRAVENOUS; SUBCUTANEOUS at 01:19

## 2018-11-03 RX ADMIN — ENOXAPARIN SODIUM 40 MILLIGRAM(S): 100 INJECTION SUBCUTANEOUS at 11:26

## 2018-11-03 RX ADMIN — HYDROMORPHONE HYDROCHLORIDE 0.5 MILLIGRAM(S): 2 INJECTION INTRAMUSCULAR; INTRAVENOUS; SUBCUTANEOUS at 01:35

## 2018-11-03 RX ADMIN — HYDROMORPHONE HYDROCHLORIDE 0.5 MILLIGRAM(S): 2 INJECTION INTRAMUSCULAR; INTRAVENOUS; SUBCUTANEOUS at 06:11

## 2018-11-03 RX ADMIN — SODIUM CHLORIDE 70 MILLILITER(S): 9 INJECTION INTRAMUSCULAR; INTRAVENOUS; SUBCUTANEOUS at 06:00

## 2018-11-03 RX ADMIN — HYDROMORPHONE HYDROCHLORIDE 0.5 MILLIGRAM(S): 2 INJECTION INTRAMUSCULAR; INTRAVENOUS; SUBCUTANEOUS at 16:36

## 2018-11-03 RX ADMIN — POLYETHYLENE GLYCOL 3350 17 GRAM(S): 17 POWDER, FOR SOLUTION ORAL at 11:26

## 2018-11-03 RX ADMIN — Medication 1 TABLET(S): at 21:19

## 2018-11-03 RX ADMIN — HYDROMORPHONE HYDROCHLORIDE 0.5 MILLIGRAM(S): 2 INJECTION INTRAMUSCULAR; INTRAVENOUS; SUBCUTANEOUS at 17:06

## 2018-11-03 RX ADMIN — HYDROMORPHONE HYDROCHLORIDE 0.2 MILLIGRAM(S): 2 INJECTION INTRAMUSCULAR; INTRAVENOUS; SUBCUTANEOUS at 23:44

## 2018-11-03 RX ADMIN — ONDANSETRON 4 MILLIGRAM(S): 8 TABLET, FILM COATED ORAL at 06:12

## 2018-11-03 RX ADMIN — SODIUM CHLORIDE 70 MILLILITER(S): 9 INJECTION INTRAMUSCULAR; INTRAVENOUS; SUBCUTANEOUS at 13:56

## 2018-11-03 RX ADMIN — SENNA PLUS 2 TABLET(S): 8.6 TABLET ORAL at 21:19

## 2018-11-03 RX ADMIN — HYDROMORPHONE HYDROCHLORIDE 0.5 MILLIGRAM(S): 2 INJECTION INTRAMUSCULAR; INTRAVENOUS; SUBCUTANEOUS at 13:52

## 2018-11-03 RX ADMIN — ONDANSETRON 4 MILLIGRAM(S): 8 TABLET, FILM COATED ORAL at 01:19

## 2018-11-03 RX ADMIN — HYDROMORPHONE HYDROCHLORIDE 0.5 MILLIGRAM(S): 2 INJECTION INTRAMUSCULAR; INTRAVENOUS; SUBCUTANEOUS at 19:36

## 2018-11-03 RX ADMIN — PANTOPRAZOLE SODIUM 40 MILLIGRAM(S): 20 TABLET, DELAYED RELEASE ORAL at 06:01

## 2018-11-03 RX ADMIN — ONDANSETRON 4 MILLIGRAM(S): 8 TABLET, FILM COATED ORAL at 13:48

## 2018-11-03 RX ADMIN — HYDROMORPHONE HYDROCHLORIDE 0.5 MILLIGRAM(S): 2 INJECTION INTRAMUSCULAR; INTRAVENOUS; SUBCUTANEOUS at 19:50

## 2018-11-03 NOTE — PROGRESS NOTE ADULT - SUBJECTIVE AND OBJECTIVE BOX
Urology Progress Note    Overnight Events:  No acute urologic events overnight. Pain still reports pain.    Review of Systems:   Constitutional: No weight loss, no weakness  CV: No chest pain, no chest pressure  Pulm: No shortness of breath, cough, or sputum    Physical Exam:  Vital signs  T(C): 36.8 (18 @ 17:33), Max: 37.4 (18 @ 22:29)  HR: 88 (18 @ 17:33)  BP: 133/81 (18 @ 17:33)  SpO2: 95% (18 @ 17:33)  Wt(kg): --    Gen: No acute distress. Normal mood  Abd: soft, non-tender, non-distended  : Non-palpable bladder    Labs:              Urinalysis Basic - ( 2018 16:53 )    Color: Light Yellow / Appearance: Clear / S.014 / pH: x  Gluc: x / Ketone: Negative  / Bili: Negative / Urobili: Negative   Blood: x / Protein: Negative / Nitrite: Negative   Leuk Esterase: Negative / RBC: x / WBC x   Sq Epi: x / Non Sq Epi: x / Bacteria: x

## 2018-11-03 NOTE — PROGRESS NOTE ADULT - SUBJECTIVE AND OBJECTIVE BOX
still with  right flank pain    REVIEW OF SYSTEMS:  GEN: no fever,    no chills  RESP: no SOB,   no cough  CVS: no chest pain,   no palpitations  GI: no abdominal pain,   no nausea,   no vomiting,   no constipation,   no diarrhea  : no dysuria,   no frequency  NEURO: no headache,   no dizziness  PSYCH: no depression,   not anxious  Derm : no rash    MEDICATIONS  (STANDING):  amoxicillin  875 milliGRAM(s)/clavulanate 1 Tablet(s) Oral two times a day  enoxaparin Injectable 40 milliGRAM(s) SubCutaneous daily  pantoprazole    Tablet 40 milliGRAM(s) Oral before breakfast  senna 2 Tablet(s) Oral at bedtime  sodium chloride 0.9%. 1000 milliLiter(s) (70 mL/Hr) IV Continuous <Continuous>  tamsulosin 0.4 milliGRAM(s) Oral at bedtime  zolpidem 5 milliGRAM(s) Oral at bedtime    MEDICATIONS  (PRN):  HYDROmorphone  Injectable 0.5 milliGRAM(s) IV Push every 6 hours PRN Severe Pain (7 - 10)  HYDROmorphone  Injectable 0.2 milliGRAM(s) IV Push every 4 hours PRN Moderate Pain (4 - 6)  ondansetron Injectable 4 milliGRAM(s) IV Push three times a day PRN Nausea and/or Vomiting      Vital Signs Last 24 Hrs  T(C): 36.7 (03 Nov 2018 09:31), Max: 37.4 (02 Nov 2018 22:29)  T(F): 98 (03 Nov 2018 09:31), Max: 99.4 (02 Nov 2018 22:29)  HR: 75 (03 Nov 2018 09:31) (75 - 88)  BP: 125/80 (03 Nov 2018 09:31) (109/70 - 125/80)  BP(mean): --  RR: 18 (03 Nov 2018 09:31) (18 - 18)  SpO2: 97% (03 Nov 2018 09:31) (95% - 97%)  CAPILLARY BLOOD GLUCOSE        I&O's Summary    02 Nov 2018 07:01  -  03 Nov 2018 07:00  --------------------------------------------------------  IN: 1840 mL / OUT: 3600 mL / NET: -1760 mL    03 Nov 2018 08:01  -  03 Nov 2018 11:43  --------------------------------------------------------  IN: 240 mL / OUT: 700 mL / NET: -460 mL        PHYSICAL EXAM:  HEAD:  Atraumatic, Normocephalic  NECK: Supple, No   JVD  CHEST/LUNG:   no     rales,     no,    rhonchi  HEART: Regular rate and rhythm;         murmur  ABDOMEN: Soft, Nontender, ;   EXTREMITIES:        edema  NEUROLOGY:  alert    LABS:                        15.0   11.6  )-----------( 290      ( 01 Nov 2018 17:39 )             43.0     11-01    140  |  102  |  15  ----------------------------<  92  3.2<L>   |  22  |  1.12    Ca    9.3      01 Nov 2018 17:39    TPro  7.5  /  Alb  4.1  /  TBili  0.8  /  DBili  x   /  AST  93<H>  /  ALT  129<H>  /  AlkPhos  84  11-01 11-01 @ 18:18  3.1  <50      Thyroid Stimulating Hormone, Serum: 1.94 uIU/mL (10-28 @ 12:29)          Consultant(s) Notes Reviewed:      Care Discussed with Consultants/Other Providers:

## 2018-11-03 NOTE — PROGRESS NOTE ADULT - ASSESSMENT
pt  with  h/o  fx  right  ankle a year  ago/  h/o  spinal  stenosis,,  right  foot  drop,  renal stone   recent admission for  CAP.  still on Augmentin ,  need  to  complete  course    now admitted  with   right flank pain   denies    fevers/  chills/ dysuria/ cp/sob   urology  eval,  noted,  no intrevention  pain meds   in er  with  wbc of 11.00    ct  abdomen  noted  pain better with pain meds  awaiting passage  of stone        < from: CT Abdomen and Pelvis No Cont (11.01.18 @ 18:11) >  IMPRESSION: 3 mm calculus in the distal right ureter with associated mild   right hydronephrosis.  < end of copied text >      < from: CT Abdomen and Pelvis No Cont (10.23.18 @ 21:46) >  ADRENALS: Unremarkable.  KIDNEYS/URETERS: Minimal right perinephric stranding without   hydroureteronephrosis or obstructing radiopaque urinary tract stone.   Punctate 0.2 cm nonobstructing right lower pole renal stone. Duplicated   right renal collecting system.  < end of copied text >  l  < from: CT Chest No Cont (10.27.18 @ 15:49) >  IMPRESSION: Multifocal pneumonia as above.  < end of copied text >

## 2018-11-03 NOTE — PROGRESS NOTE ADULT - PROBLEM SELECTOR PLAN 1
-Continue medical expulsive therapy  -Flomax 0.4mg daily  -Hydration  -Pain control  -Ambulation  -Strain all urine  -If he develops fevers, uncontrollable pain, AMY, will need to assess for ureteral stent placement  -If pain is controlled, can followup with his urologist as outpatient

## 2018-11-04 LAB
ANION GAP SERPL CALC-SCNC: 12 MMOL/L — SIGNIFICANT CHANGE UP (ref 5–17)
BUN SERPL-MCNC: 8 MG/DL — SIGNIFICANT CHANGE UP (ref 7–23)
CALCIUM SERPL-MCNC: 9.5 MG/DL — SIGNIFICANT CHANGE UP (ref 8.4–10.5)
CHLORIDE SERPL-SCNC: 100 MMOL/L — SIGNIFICANT CHANGE UP (ref 96–108)
CO2 SERPL-SCNC: 24 MMOL/L — SIGNIFICANT CHANGE UP (ref 22–31)
CREAT SERPL-MCNC: 1.21 MG/DL — SIGNIFICANT CHANGE UP (ref 0.5–1.3)
GLUCOSE SERPL-MCNC: 104 MG/DL — HIGH (ref 70–99)
HCT VFR BLD CALC: 38.4 % — LOW (ref 39–50)
HGB BLD-MCNC: 13.7 G/DL — SIGNIFICANT CHANGE UP (ref 13–17)
MCHC RBC-ENTMCNC: 30 PG — SIGNIFICANT CHANGE UP (ref 27–34)
MCHC RBC-ENTMCNC: 35.7 GM/DL — SIGNIFICANT CHANGE UP (ref 32–36)
MCV RBC AUTO: 84 FL — SIGNIFICANT CHANGE UP (ref 80–100)
PLATELET # BLD AUTO: 236 K/UL — SIGNIFICANT CHANGE UP (ref 150–400)
POTASSIUM SERPL-MCNC: 3.7 MMOL/L — SIGNIFICANT CHANGE UP (ref 3.5–5.3)
POTASSIUM SERPL-SCNC: 3.7 MMOL/L — SIGNIFICANT CHANGE UP (ref 3.5–5.3)
RBC # BLD: 4.57 M/UL — SIGNIFICANT CHANGE UP (ref 4.2–5.8)
RBC # FLD: 13.3 % — SIGNIFICANT CHANGE UP (ref 10.3–14.5)
SODIUM SERPL-SCNC: 136 MMOL/L — SIGNIFICANT CHANGE UP (ref 135–145)
WBC # BLD: 9.35 K/UL — SIGNIFICANT CHANGE UP (ref 3.8–10.5)
WBC # FLD AUTO: 9.35 K/UL — SIGNIFICANT CHANGE UP (ref 3.8–10.5)

## 2018-11-04 RX ORDER — LACTULOSE 10 G/15ML
20 SOLUTION ORAL ONCE
Qty: 0 | Refills: 0 | Status: COMPLETED | OUTPATIENT
Start: 2018-11-04 | End: 2018-11-04

## 2018-11-04 RX ORDER — KETOROLAC TROMETHAMINE 30 MG/ML
15 SYRINGE (ML) INJECTION ONCE
Qty: 0 | Refills: 0 | Status: DISCONTINUED | OUTPATIENT
Start: 2018-11-04 | End: 2018-11-04

## 2018-11-04 RX ORDER — ACETAMINOPHEN 500 MG
650 TABLET ORAL EVERY 6 HOURS
Qty: 0 | Refills: 0 | Status: DISCONTINUED | OUTPATIENT
Start: 2018-11-04 | End: 2018-11-04

## 2018-11-04 RX ORDER — HYDROMORPHONE HYDROCHLORIDE 2 MG/ML
0.5 INJECTION INTRAMUSCULAR; INTRAVENOUS; SUBCUTANEOUS EVERY 4 HOURS
Qty: 0 | Refills: 0 | Status: DISCONTINUED | OUTPATIENT
Start: 2018-11-04 | End: 2018-11-06

## 2018-11-04 RX ORDER — POLYETHYLENE GLYCOL 3350 17 G/17G
17 POWDER, FOR SOLUTION ORAL DAILY
Qty: 0 | Refills: 0 | Status: DISCONTINUED | OUTPATIENT
Start: 2018-11-04 | End: 2018-11-06

## 2018-11-04 RX ORDER — ACETAMINOPHEN 500 MG
1000 TABLET ORAL ONCE
Qty: 0 | Refills: 0 | Status: COMPLETED | OUTPATIENT
Start: 2018-11-04 | End: 2018-11-04

## 2018-11-04 RX ORDER — KETOROLAC TROMETHAMINE 30 MG/ML
15 SYRINGE (ML) INJECTION EVERY 8 HOURS
Qty: 0 | Refills: 0 | Status: DISCONTINUED | OUTPATIENT
Start: 2018-11-04 | End: 2018-11-06

## 2018-11-04 RX ORDER — DOCUSATE SODIUM 100 MG
100 CAPSULE ORAL THREE TIMES A DAY
Qty: 0 | Refills: 0 | Status: DISCONTINUED | OUTPATIENT
Start: 2018-11-04 | End: 2018-11-06

## 2018-11-04 RX ADMIN — HYDROMORPHONE HYDROCHLORIDE 0.2 MILLIGRAM(S): 2 INJECTION INTRAMUSCULAR; INTRAVENOUS; SUBCUTANEOUS at 00:00

## 2018-11-04 RX ADMIN — Medication 1000 MILLIGRAM(S): at 12:03

## 2018-11-04 RX ADMIN — TAMSULOSIN HYDROCHLORIDE 0.4 MILLIGRAM(S): 0.4 CAPSULE ORAL at 21:41

## 2018-11-04 RX ADMIN — Medication 1 TABLET(S): at 11:35

## 2018-11-04 RX ADMIN — Medication 400 MILLIGRAM(S): at 11:33

## 2018-11-04 RX ADMIN — HYDROMORPHONE HYDROCHLORIDE 0.5 MILLIGRAM(S): 2 INJECTION INTRAMUSCULAR; INTRAVENOUS; SUBCUTANEOUS at 14:45

## 2018-11-04 RX ADMIN — HYDROMORPHONE HYDROCHLORIDE 0.5 MILLIGRAM(S): 2 INJECTION INTRAMUSCULAR; INTRAVENOUS; SUBCUTANEOUS at 01:49

## 2018-11-04 RX ADMIN — HYDROMORPHONE HYDROCHLORIDE 0.5 MILLIGRAM(S): 2 INJECTION INTRAMUSCULAR; INTRAVENOUS; SUBCUTANEOUS at 10:32

## 2018-11-04 RX ADMIN — ENOXAPARIN SODIUM 40 MILLIGRAM(S): 100 INJECTION SUBCUTANEOUS at 11:35

## 2018-11-04 RX ADMIN — PANTOPRAZOLE SODIUM 40 MILLIGRAM(S): 20 TABLET, DELAYED RELEASE ORAL at 06:06

## 2018-11-04 RX ADMIN — HYDROMORPHONE HYDROCHLORIDE 0.5 MILLIGRAM(S): 2 INJECTION INTRAMUSCULAR; INTRAVENOUS; SUBCUTANEOUS at 14:12

## 2018-11-04 RX ADMIN — POLYETHYLENE GLYCOL 3350 17 GRAM(S): 17 POWDER, FOR SOLUTION ORAL at 21:42

## 2018-11-04 RX ADMIN — SODIUM CHLORIDE 70 MILLILITER(S): 9 INJECTION INTRAMUSCULAR; INTRAVENOUS; SUBCUTANEOUS at 06:05

## 2018-11-04 RX ADMIN — SENNA PLUS 2 TABLET(S): 8.6 TABLET ORAL at 21:41

## 2018-11-04 RX ADMIN — Medication 1 TABLET(S): at 21:41

## 2018-11-04 RX ADMIN — LACTULOSE 20 GRAM(S): 10 SOLUTION ORAL at 21:40

## 2018-11-04 RX ADMIN — ONDANSETRON 4 MILLIGRAM(S): 8 TABLET, FILM COATED ORAL at 21:46

## 2018-11-04 RX ADMIN — Medication 15 MILLIGRAM(S): at 17:00

## 2018-11-04 RX ADMIN — Medication 100 MILLIGRAM(S): at 21:40

## 2018-11-04 RX ADMIN — HYDROMORPHONE HYDROCHLORIDE 0.5 MILLIGRAM(S): 2 INJECTION INTRAMUSCULAR; INTRAVENOUS; SUBCUTANEOUS at 01:24

## 2018-11-04 RX ADMIN — ONDANSETRON 4 MILLIGRAM(S): 8 TABLET, FILM COATED ORAL at 09:52

## 2018-11-04 RX ADMIN — Medication 15 MILLIGRAM(S): at 23:43

## 2018-11-04 RX ADMIN — HYDROMORPHONE HYDROCHLORIDE 0.5 MILLIGRAM(S): 2 INJECTION INTRAMUSCULAR; INTRAVENOUS; SUBCUTANEOUS at 09:52

## 2018-11-04 RX ADMIN — Medication 15 MILLIGRAM(S): at 16:31

## 2018-11-04 RX ADMIN — SODIUM CHLORIDE 70 MILLILITER(S): 9 INJECTION INTRAMUSCULAR; INTRAVENOUS; SUBCUTANEOUS at 23:14

## 2018-11-04 NOTE — PROGRESS NOTE ADULT - ASSESSMENT
pt  with  h/o  fx  right  ankle a year  ago/  h/o  spinal  stenosis,,  right  foot  drop,  renal stone   recent admission for  CAP.  still on Augmentin ,  need  to  complete  course    now admitted  with   right flank pain   denies    fevers/  chills/ dysuria/ cp/sob   urology  eval,  noted,  no intrevention  pain meds   in er  with  wbc of 11.00    ct  abdomen  noted  pain better with pain meds  awaiting passage  of stone,   if pain better,  may d/c later,  pt wants  to  wait and  see.  has  groin pain, on voiding        < from: CT Abdomen and Pelvis No Cont (11.01.18 @ 18:11) >  IMPRESSION: 3 mm calculus in the distal right ureter with associated mild   right hydronephrosis.  < end of copied text >      < from: CT Abdomen and Pelvis No Cont (10.23.18 @ 21:46) >  ADRENALS: Unremarkable.  KIDNEYS/URETERS: Minimal right perinephric stranding without   hydroureteronephrosis or obstructing radiopaque urinary tract stone.   Punctate 0.2 cm nonobstructing right lower pole renal stone. Duplicated   right renal collecting system.  < end of copied text >  l  < from: CT Chest No Cont (10.27.18 @ 15:49) >  IMPRESSION: Multifocal pneumonia as above.  < end of copied text >

## 2018-11-04 NOTE — PROGRESS NOTE ADULT - SUBJECTIVE AND OBJECTIVE BOX
less abd pain  REVIEW OF SYSTEMS:  GEN: no fever,    no chills  RESP: no SOB,   no cough  CVS: no chest pain,   no palpitations  GI: no abdominal pain,   no nausea,   no vomiting,   no constipation,   no diarrhea  : no dysuria,   no frequency  NEURO: no headache,   no dizziness  PSYCH: no depression,   not anxious  Derm : no rash    MEDICATIONS  (STANDING):  amoxicillin  875 milliGRAM(s)/clavulanate 1 Tablet(s) Oral two times a day  bisacodyl Suppository 10 milliGRAM(s) Rectal once  enoxaparin Injectable 40 milliGRAM(s) SubCutaneous daily  pantoprazole    Tablet 40 milliGRAM(s) Oral before breakfast  senna 2 Tablet(s) Oral at bedtime  sodium chloride 0.9%. 1000 milliLiter(s) (70 mL/Hr) IV Continuous <Continuous>  tamsulosin 0.4 milliGRAM(s) Oral at bedtime  zolpidem 5 milliGRAM(s) Oral at bedtime    MEDICATIONS  (PRN):  HYDROmorphone  Injectable 0.5 milliGRAM(s) IV Push every 6 hours PRN Severe Pain (7 - 10)  HYDROmorphone  Injectable 0.2 milliGRAM(s) IV Push every 4 hours PRN Moderate Pain (4 - 6)  ondansetron Injectable 4 milliGRAM(s) IV Push three times a day PRN Nausea and/or Vomiting      Vital Signs Last 24 Hrs  T(C): 36.9 (2018 05:58), Max: 37.4 (2018 21:08)  T(F): 98.4 (2018 05:58), Max: 99.4 (2018 01:35)  HR: 92 (2018 05:58) (75 - 93)  BP: 122/92 (2018 05:58) (117/80 - 133/81)  BP(mean): --  RR: 18 (2018 05:58) (18 - 18)  SpO2: 95% (2018 05:58) (94% - 97%)  CAPILLARY BLOOD GLUCOSE        I&O's Summary    2018 08:01  -  2018 07:00  --------------------------------------------------------  IN: 2560 mL / OUT: 2550 mL / NET: 10 mL        PHYSICAL EXAM:  HEAD:  Atraumatic, Normocephalic  NECK: Supple, No   JVD  CHEST/LUNG:   no     rales,     no,    rhonchi  HEART: Regular rate and rhythm;         murmur  ABDOMEN: Soft, Nontender, ;   EXTREMITIES:    no    edema  NEUROLOGY:  alert    LABS:                Urinalysis Basic - ( 2018 16:53 )    Color: Light Yellow / Appearance: Clear / S.014 / pH: x  Gluc: x / Ketone: Negative  / Bili: Negative / Urobili: Negative   Blood: x / Protein: Negative / Nitrite: Negative   Leuk Esterase: Negative / RBC: x / WBC x   Sq Epi: x / Non Sq Epi: x / Bacteria: x              Thyroid Stimulating Hormone, Serum: 1.94 uIU/mL (10-28 @ 12:29)          Consultant(s) Notes Reviewed:      Care Discussed with Consultants/Other Providers:

## 2018-11-04 NOTE — PROGRESS NOTE ADULT - PROBLEM SELECTOR PLAN 1
-I discussed treatment options with the patient, including continued medical expulsive therapy versus surgery--cystoscopy with right ureteral stent placement and possible ureteroscopy with litho/extraction. I discussed risks and benefits of both treatment options. Pt wants to continue with MET for now.  -NPO at midnight for possible intervention tomorrow  -Flomax  -Hydration  -Pain control  -Strain all urine

## 2018-11-04 NOTE — PROGRESS NOTE ADULT - SUBJECTIVE AND OBJECTIVE BOX
Urology Progress Note    Overnight Events:  Continues to have intermittent renal colic. Pain was controlled for part of the night.    Review of Systems:   Constitutional: No weight loss, no weakness  CV: No chest pain, no chest pressure  Pulm: No shortness of breath, cough, or sputum    Physical Exam:  Vital signs  T(C): 37.3 (18 @ 09:00), Max: 37.4 (18 @ 21:08)  HR: 81 (18 @ 09:00)  BP: 129/93 (18 @ 09:00)  SpO2: 95% (18 @ 09:00)  Wt(kg): --    Gen: No acute distress. Normal mood  Abd: soft, non-tender, non-distended  : Non-palpable bladder    Labs:       @ 08:52    WBC 9.35  / Hct 38.4  / SCr --        @ 07:24    WBC --    / Hct --    / SCr 1.21         136  |  100  |  8   ----------------------------<  104<H>  3.7   |  24  |  1.21    Ca    9.5      2018 07:24        Urinalysis Basic - ( 2018 16:53 )    Color: Light Yellow / Appearance: Clear / S.014 / pH: x  Gluc: x / Ketone: Negative  / Bili: Negative / Urobili: Negative   Blood: x / Protein: Negative / Nitrite: Negative   Leuk Esterase: Negative / RBC: x / WBC x   Sq Epi: x / Non Sq Epi: x / Bacteria: x

## 2018-11-05 ENCOUNTER — TRANSCRIPTION ENCOUNTER (OUTPATIENT)
Age: 43
End: 2018-11-05

## 2018-11-05 LAB
APPEARANCE UR: CLEAR — SIGNIFICANT CHANGE UP
BILIRUB UR-MCNC: NEGATIVE — SIGNIFICANT CHANGE UP
COLOR SPEC: SIGNIFICANT CHANGE UP
DIFF PNL FLD: NEGATIVE — SIGNIFICANT CHANGE UP
GLUCOSE UR QL: NEGATIVE — SIGNIFICANT CHANGE UP
KETONES UR-MCNC: NEGATIVE — SIGNIFICANT CHANGE UP
LEUKOCYTE ESTERASE UR-ACNC: NEGATIVE — SIGNIFICANT CHANGE UP
NITRITE UR-MCNC: NEGATIVE — SIGNIFICANT CHANGE UP
PH UR: 6 — SIGNIFICANT CHANGE UP (ref 5–8)
PROT UR-MCNC: NEGATIVE — SIGNIFICANT CHANGE UP
SP GR SPEC: 1.01 — LOW (ref 1.01–1.02)
UROBILINOGEN FLD QL: NEGATIVE — SIGNIFICANT CHANGE UP

## 2018-11-05 RX ORDER — TAMSULOSIN HYDROCHLORIDE 0.4 MG/1
1 CAPSULE ORAL
Qty: 0 | Refills: 0 | COMMUNITY
Start: 2018-11-05

## 2018-11-05 RX ADMIN — Medication 15 MILLIGRAM(S): at 13:27

## 2018-11-05 RX ADMIN — Medication 15 MILLIGRAM(S): at 06:21

## 2018-11-05 RX ADMIN — Medication 1 TABLET(S): at 12:16

## 2018-11-05 RX ADMIN — Medication 1 TABLET(S): at 21:37

## 2018-11-05 RX ADMIN — Medication 15 MILLIGRAM(S): at 13:57

## 2018-11-05 RX ADMIN — ENOXAPARIN SODIUM 40 MILLIGRAM(S): 100 INJECTION SUBCUTANEOUS at 12:16

## 2018-11-05 RX ADMIN — PANTOPRAZOLE SODIUM 40 MILLIGRAM(S): 20 TABLET, DELAYED RELEASE ORAL at 05:40

## 2018-11-05 RX ADMIN — TAMSULOSIN HYDROCHLORIDE 0.4 MILLIGRAM(S): 0.4 CAPSULE ORAL at 21:37

## 2018-11-05 RX ADMIN — Medication 15 MILLIGRAM(S): at 05:51

## 2018-11-05 RX ADMIN — Medication 15 MILLIGRAM(S): at 00:13

## 2018-11-05 NOTE — DISCHARGE NOTE ADULT - PLAN OF CARE
- Outpatient follow up to be scheduled with Dr Ramírez upon discharge   - Flomax continuation for 5 days   -Motrin prn for pain management -you have completed antibiotics   -Follow up kan your Primary Care Physician no further stones, remain pain free without any complications, with Outpatient  follow up

## 2018-11-05 NOTE — DISCHARGE NOTE ADULT - ADDITIONAL INSTRUCTIONS
-Follow up with Urology for further management and treatment of your stones   -Follow with your Primary care Physician in 1 week to review your hospital course  -Bring all discharge documents and prescriptions with you at the time of your appointments   -You may return to the Emergency department for any worsening or return of your symptoms

## 2018-11-05 NOTE — PROGRESS NOTE ADULT - ASSESSMENT
pt  with  h/o  fx  right  ankle a year  ago/  h/o  spinal  stenosis,,  right  foot  drop,  renal stone   recent admission for  CAP.  still on Augmentin ,  need  to  complete  course    now admitted  with   right flank pain   denies    fevers/  chills/ dysuria/ cp/sob   urology  eval,  noted,  no intrevention  pain meds   in er  with  wbc of 11.00    ct  abdomen  noted  awaiting passage  of stone,     has  groin pain, on voiding/  urology  to f/p        < from: CT Abdomen and Pelvis No Cont (11.01.18 @ 18:11) >  IMPRESSION: 3 mm calculus in the distal right ureter with associated mild   right hydronephrosis.  < end of copied text >      < from: CT Abdomen and Pelvis No Cont (10.23.18 @ 21:46) >  ADRENALS: Unremarkable.  KIDNEYS/URETERS: Minimal right perinephric stranding without   hydroureteronephrosis or obstructing radiopaque urinary tract stone.   Punctate 0.2 cm nonobstructing right lower pole renal stone. Duplicated   right renal collecting system.  < end of copied text >  l  < from: CT Chest No Cont (10.27.18 @ 15:49) >  IMPRESSION: Multifocal pneumonia as above.  < end of copied text >

## 2018-11-05 NOTE — DISCHARGE NOTE ADULT - CARE PROVIDERS DIRECT ADDRESSES
[email or web address]

## 2018-11-05 NOTE — PROVIDER CONTACT NOTE (OTHER) - ACTION/TREATMENT ORDERED:
NP notified, instructed to obtain UA and to offer patient Dilaudid. Pt requesting to wait until Toradol is due next, will continue to monitor.
NP stated no action necessary at this time, pt is A&Ox4 and able to refuse labs

## 2018-11-05 NOTE — DISCHARGE NOTE ADULT - CARE PROVIDER_API CALL
Wilmer Massey (MD), Urology  [address]  [place] [address]  Phone: [phone]  Fax: [phone]

## 2018-11-05 NOTE — DISCHARGE NOTE ADULT - PATIENT PORTAL LINK FT
You can access the [facility] Patient Portal, offered by [facility], by registering with the following website: [email or web address]

## 2018-11-05 NOTE — DISCHARGE NOTE ADULT - HOSPITAL COURSE
42 y/o Male with pmhx of  fx  right  ankle a year  ago/  h/o  spinal  stenosis,,  right  foot  drop,  renal stone, and recent admission for  CAP.  still on Augmentin ,  need  to  complete  course  now admitted  with  right flank pain r/o renal calculi. Had Ct scan of abd/pelvis revealing 3 mm calculus in the distal right ureter with associated mild right hydronephrosis. Was seen  and evaluated by Urology service. who felt renal colic-likely passed stone or is at UVJ causing urinary/bladder symptom.  Told if he remains pain free can d/c and f/u outpt. PT observed and noted to have clinically improved. Was deemed medically stable and cleared for discharge today by Attending Physician. Outpatient follow up to be scheduled with Dr Ramírez upon discharge and his Primary Care Physician.

## 2018-11-05 NOTE — DISCHARGE NOTE ADULT - CARE PLAN
Principal Discharge DX:	Ureteral stone  Secondary Diagnosis:	Pneumonia Principal Discharge DX:	Ureteral stone  Assessment and plan of treatment:	- Outpatient follow up to be scheduled with Dr Ramírez upon discharge   - Flomax continuation for 5 days   -Motrin prn for pain management  Secondary Diagnosis:	Pneumonia  Assessment and plan of treatment:	-you have completed antibiotics   -Follow up kan your Primary Care Physician Principal Discharge DX:	Ureteral stone  Goal:	no further stones, remain pain free without any complications, with Outpatient  follow up  Assessment and plan of treatment:	- Outpatient follow up to be scheduled with Dr Ramírez upon discharge   - Flomax continuation for 5 days   -Motrin prn for pain management  Secondary Diagnosis:	Pneumonia  Assessment and plan of treatment:	-you have completed antibiotics   -Follow up kan your Primary Care Physician

## 2018-11-05 NOTE — DISCHARGE NOTE ADULT - MEDICATION SUMMARY - MEDICATIONS TO TAKE
I will START or STAY ON the medications listed below when I get home from the hospital:    ibuprofen 600 mg oral tablet  -- 1 tab(s) by mouth once a day   -- Do not take this drug if you are pregnant.  It is very important that you take or use this exactly as directed.  Do not skip doses or discontinue unless directed by your doctor.  May cause drowsiness or dizziness.  Obtain medical advice before taking any non-prescription drugs as some may affect the action of this medication.  Take with food or milk.    -- Indication: For Flank pain    tamsulosin 0.4 mg oral capsule  -- 1 cap(s) by mouth once a day (at bedtime)  -- Indication: For Ureteral stone    pantoprazole 40 mg oral delayed release tablet  -- 1 tab(s) by mouth once a day (before a meal)  -- Indication: For Gastritis

## 2018-11-05 NOTE — PROGRESS NOTE ADULT - SUBJECTIVE AND OBJECTIVE BOX
The patient is pain free at present-notes sense of urgency    Vital Signs Last 24 Hrs  T(C): 36.6 (2018 17:03), Max: 37.2 (2018 06:15)  T(F): 97.9 (2018 17:03), Max: 98.9 (2018 06:15)  HR: 80 (2018 17:03) (74 - 100)  BP: 119/83 (2018 17:03) (116/78 - 128/89)  BP(mean): --  RR: 18 (2018 17:03) (18 - 18)  SpO2: 98% (2018 17:03) (95% - 98%)    PHYSICAL EXAM:    NAD, well-developed  Abd: soft, NT/ND, No CVAT        I&O's Summary    2018 07:01  -  2018 07:00  --------------------------------------------------------  IN: 3060 mL / OUT: 2400 mL / NET: 660 mL    2018 07:01  -  2018 18:53  --------------------------------------------------------  IN: 0 mL / OUT: 975 mL / NET: -975 mL        LABS:                        13.7   9.35  )-----------( 236      ( 2018 08:52 )             38.4     11-04    136  |  100  |  8   ----------------------------<  104<H>  3.7   |  24  |  1.21    Ca    9.5      2018 07:24      Urinalysis Basic - ( 2018 10:12 )    Color: Light Yellow / Appearance: Clear / S.008 / pH: x  Gluc: x / Ketone: Negative  / Bili: Negative / Urobili: Negative   Blood: x / Protein: Negative / Nitrite: Negative   Leuk Esterase: Negative / RBC: x / WBC x   Sq Epi: x / Non Sq Epi: x / Bacteria: x      Urine Culture: neg    Prior notes/chart reviewed.

## 2018-11-05 NOTE — PROGRESS NOTE ADULT - ASSESSMENT
renal colic-likely passed stone or is at UVJ causing urinary/bladder ymptoms    if he remains pain free can d/c and f/u out pt    if pain returns will schedule for uscope

## 2018-11-05 NOTE — PROGRESS NOTE ADULT - SUBJECTIVE AND OBJECTIVE BOX
pain, bit better  has groin  worse  REVIEW OF SYSTEMS:  GEN: no fever,    no chills  RESP: no SOB,   no cough  CVS: no chest pain,   no palpitations  GI: no abdominal pain,   no nausea,   no vomiting,   no constipation,   no diarrhea  : no dysuria,   no frequency  NEURO: no headache,   no dizziness  PSYCH: no depression,   not anxious  Derm : no rash    MEDICATIONS  (STANDING):  amoxicillin  875 milliGRAM(s)/clavulanate 1 Tablet(s) Oral two times a day  bisacodyl Suppository 10 milliGRAM(s) Rectal once  docusate sodium 100 milliGRAM(s) Oral three times a day  enoxaparin Injectable 40 milliGRAM(s) SubCutaneous daily  pantoprazole    Tablet 40 milliGRAM(s) Oral before breakfast  senna 2 Tablet(s) Oral at bedtime  sodium chloride 0.9%. 1000 milliLiter(s) (70 mL/Hr) IV Continuous <Continuous>  tamsulosin 0.4 milliGRAM(s) Oral at bedtime  zolpidem 5 milliGRAM(s) Oral at bedtime    MEDICATIONS  (PRN):  HYDROmorphone  Injectable 0.5 milliGRAM(s) IV Push every 4 hours PRN Moderate Pain (4 - 6)  ketorolac   Injectable 15 milliGRAM(s) IV Push every 8 hours PRN Severe Pain (7 - 10)  ondansetron Injectable 4 milliGRAM(s) IV Push three times a day PRN Nausea and/or Vomiting  polyethylene glycol 3350 17 Gram(s) Oral daily PRN Constipation      Vital Signs Last 24 Hrs  T(C): 37.2 (2018 06:15), Max: 37.3 (2018 09:00)  T(F): 98.9 (2018 06:15), Max: 99.2 (2018 09:00)  HR: 82 (2018 06:15) (74 - 100)  BP: 128/89 (2018 06:15) (113/80 - 129/93)  BP(mean): --  RR: 18 (2018 06:15) (18 - 18)  SpO2: 95% (2018 06:15) (94% - 95%)  CAPILLARY BLOOD GLUCOSE        I&O's Summary    2018 07:01  -  2018 07:00  --------------------------------------------------------  IN: 2220 mL / OUT: 2400 mL / NET: -180 mL        PHYSICAL EXAM:  HEAD:  Atraumatic, Normocephalic  NECK: Supple, No   JVD  CHEST/LUNG:   no     rales,     no,    rhonchi  HEART: Regular rate and rhythm;         murmur  ABDOMEN: Soft, Nontender, ;   EXTREMITIES:    no    edema  NEUROLOGY:  alert    LABS:                        13.7   9.35  )-----------( 236      ( 2018 08:52 )             38.4     11-04    136  |  100  |  8   ----------------------------<  104<H>  3.7   |  24  |  1.21    Ca    9.5      2018 07:24            Urinalysis Basic - ( 2018 16:53 )    Color: Light Yellow / Appearance: Clear / S.014 / pH: x  Gluc: x / Ketone: Negative  / Bili: Negative / Urobili: Negative   Blood: x / Protein: Negative / Nitrite: Negative   Leuk Esterase: Negative / RBC: x / WBC x   Sq Epi: x / Non Sq Epi: x / Bacteria: x              Thyroid Stimulating Hormone, Serum: 1.94 uIU/mL (10-28 @ 12:29)          Consultant(s) Notes Reviewed:      Care Discussed with Consultants/Other Providers:

## 2018-11-06 VITALS
RESPIRATION RATE: 18 BRPM | DIASTOLIC BLOOD PRESSURE: 73 MMHG | TEMPERATURE: 98 F | HEART RATE: 76 BPM | SYSTOLIC BLOOD PRESSURE: 110 MMHG | OXYGEN SATURATION: 95 %

## 2018-11-06 RX ORDER — IBUPROFEN 200 MG
1 TABLET ORAL
Qty: 10 | Refills: 0 | OUTPATIENT
Start: 2018-11-06 | End: 2018-11-15

## 2018-11-06 RX ORDER — TAMSULOSIN HYDROCHLORIDE 0.4 MG/1
1 CAPSULE ORAL
Qty: 5 | Refills: 0 | OUTPATIENT
Start: 2018-11-06 | End: 2018-11-10

## 2018-11-06 RX ADMIN — Medication 15 MILLIGRAM(S): at 02:45

## 2018-11-06 RX ADMIN — Medication 15 MILLIGRAM(S): at 02:27

## 2018-11-06 RX ADMIN — Medication 15 MILLIGRAM(S): at 12:29

## 2018-11-06 RX ADMIN — PANTOPRAZOLE SODIUM 40 MILLIGRAM(S): 20 TABLET, DELAYED RELEASE ORAL at 05:41

## 2018-11-06 NOTE — PROGRESS NOTE ADULT - ASSESSMENT
pt  with  h/o  fx  right  ankle a year  ago/  h/o  spinal  stenosis,,  right  foot  drop,  renal stone   recent admission for  CAP.  still on Augmentin ,  need  to  complete  course    now admitted  with   right flank pain   denies    fevers/  chills/ dysuria/ cp/sob   urology  eval,  noted,  no intrevention  pain meds   in er  with  wbc of 11.00    ct  abdomen  as  below,     had  groin pain, on voiding/  likely  passed  stone  d/c  if pt feels  better        < from: CT Abdomen and Pelvis No Cont (11.01.18 @ 18:11) >  IMPRESSION: 3 mm calculus in the distal right ureter with associated mild   right hydronephrosis.  < end of copied text >      < from: CT Abdomen and Pelvis No Cont (10.23.18 @ 21:46) >  ADRENALS: Unremarkable.  KIDNEYS/URETERS: Minimal right perinephric stranding without   hydroureteronephrosis or obstructing radiopaque urinary tract stone.   Punctate 0.2 cm nonobstructing right lower pole renal stone. Duplicated   right renal collecting system.  < end of copied text >  l  < from: CT Chest No Cont (10.27.18 @ 15:49) >  IMPRESSION: Multifocal pneumonia as above.  < end of copied text >

## 2018-11-06 NOTE — PROGRESS NOTE ADULT - SUBJECTIVE AND OBJECTIVE BOX
likely  passed stone  REVIEW OF SYSTEMS:  GEN: no fever,    no chills  RESP: no SOB,   no cough  CVS: no chest pain,   no palpitations  GI: no abdominal pain,   no nausea,   no vomiting,   no constipation,   no diarrhea  : no dysuria,   no frequency  NEURO: no headache,   no dizziness  PSYCH: no depression,   not anxious  Derm : no rash    MEDICATIONS  (STANDING):  amoxicillin  875 milliGRAM(s)/clavulanate 1 Tablet(s) Oral two times a day  bisacodyl Suppository 10 milliGRAM(s) Rectal once  docusate sodium 100 milliGRAM(s) Oral three times a day  enoxaparin Injectable 40 milliGRAM(s) SubCutaneous daily  pantoprazole    Tablet 40 milliGRAM(s) Oral before breakfast  senna 2 Tablet(s) Oral at bedtime  sodium chloride 0.9%. 1000 milliLiter(s) (70 mL/Hr) IV Continuous <Continuous>  tamsulosin 0.4 milliGRAM(s) Oral at bedtime  zolpidem 5 milliGRAM(s) Oral at bedtime    MEDICATIONS  (PRN):  HYDROmorphone  Injectable 0.5 milliGRAM(s) IV Push every 4 hours PRN Moderate Pain (4 - 6)  ketorolac   Injectable 15 milliGRAM(s) IV Push every 8 hours PRN Severe Pain (7 - 10)  ondansetron Injectable 4 milliGRAM(s) IV Push three times a day PRN Nausea and/or Vomiting  polyethylene glycol 3350 17 Gram(s) Oral daily PRN Constipation      Vital Signs Last 24 Hrs  T(C): 36.7 (2018 07:33), Max: 37.7 (2018 00:54)  T(F): 98 (2018 07:33), Max: 99.8 (2018 00:54)  HR: 76 (2018 07:33) (76 - 94)  BP: 110/73 (2018 07:33) (110/73 - 129/81)  BP(mean): --  RR: 18 (2018 07:33) (18 - 18)  SpO2: 95% (2018 07:33) (95% - 98%)  CAPILLARY BLOOD GLUCOSE        I&O's Summary    2018 07:01  -  2018 07:00  --------------------------------------------------------  IN: 840 mL / OUT: 1725 mL / NET: -885 mL        PHYSICAL EXAM:  HEAD:  Atraumatic, Normocephalic  NECK: Supple, No   JVD  CHEST/LUNG:   no     rales,     no,    rhonchi  HEART: Regular rate and rhythm;         murmur  ABDOMEN: Soft, Nontender, ;   EXTREMITIES:    no    edema  NEUROLOGY:  alert    LABS:                        13.7   9.35  )-----------( 236      ( 2018 08:52 )             38.4                 Urinalysis Basic - ( 2018 10:12 )    Color: Light Yellow / Appearance: Clear / S.008 / pH: x  Gluc: x / Ketone: Negative  / Bili: Negative / Urobili: Negative   Blood: x / Protein: Negative / Nitrite: Negative   Leuk Esterase: Negative / RBC: x / WBC x   Sq Epi: x / Non Sq Epi: x / Bacteria: x              Thyroid Stimulating Hormone, Serum: 1.94 uIU/mL (10-28 @ 12:29)          Consultant(s) Notes Reviewed:      Care Discussed with Consultants/Other Providers:

## 2018-11-11 PROCEDURE — 96375 TX/PRO/DX INJ NEW DRUG ADDON: CPT

## 2018-11-11 PROCEDURE — 84132 ASSAY OF SERUM POTASSIUM: CPT

## 2018-11-11 PROCEDURE — 85027 COMPLETE CBC AUTOMATED: CPT

## 2018-11-11 PROCEDURE — 81003 URINALYSIS AUTO W/O SCOPE: CPT

## 2018-11-11 PROCEDURE — 93005 ELECTROCARDIOGRAM TRACING: CPT

## 2018-11-11 PROCEDURE — 71045 X-RAY EXAM CHEST 1 VIEW: CPT

## 2018-11-11 PROCEDURE — 99284 EMERGENCY DEPT VISIT MOD MDM: CPT | Mod: 25

## 2018-11-11 PROCEDURE — 85014 HEMATOCRIT: CPT

## 2018-11-11 PROCEDURE — 82550 ASSAY OF CK (CPK): CPT

## 2018-11-11 PROCEDURE — 87633 RESP VIRUS 12-25 TARGETS: CPT

## 2018-11-11 PROCEDURE — 85610 PROTHROMBIN TIME: CPT

## 2018-11-11 PROCEDURE — 82947 ASSAY GLUCOSE BLOOD QUANT: CPT

## 2018-11-11 PROCEDURE — 96361 HYDRATE IV INFUSION ADD-ON: CPT

## 2018-11-11 PROCEDURE — 71250 CT THORAX DX C-: CPT

## 2018-11-11 PROCEDURE — 71046 X-RAY EXAM CHEST 2 VIEWS: CPT

## 2018-11-11 PROCEDURE — 84443 ASSAY THYROID STIM HORMONE: CPT

## 2018-11-11 PROCEDURE — 83605 ASSAY OF LACTIC ACID: CPT

## 2018-11-11 PROCEDURE — 80053 COMPREHEN METABOLIC PANEL: CPT

## 2018-11-11 PROCEDURE — 82803 BLOOD GASES ANY COMBINATION: CPT

## 2018-11-11 PROCEDURE — 80061 LIPID PANEL: CPT

## 2018-11-11 PROCEDURE — 87449 NOS EACH ORGANISM AG IA: CPT

## 2018-11-11 PROCEDURE — 74176 CT ABD & PELVIS W/O CONTRAST: CPT

## 2018-11-11 PROCEDURE — 99285 EMERGENCY DEPT VISIT HI MDM: CPT | Mod: 25

## 2018-11-11 PROCEDURE — 82330 ASSAY OF CALCIUM: CPT

## 2018-11-11 PROCEDURE — 85730 THROMBOPLASTIN TIME PARTIAL: CPT

## 2018-11-11 PROCEDURE — 80048 BASIC METABOLIC PNL TOTAL CA: CPT

## 2018-11-11 PROCEDURE — 87040 BLOOD CULTURE FOR BACTERIA: CPT

## 2018-11-11 PROCEDURE — 96374 THER/PROPH/DIAG INJ IV PUSH: CPT

## 2018-11-11 PROCEDURE — 84145 PROCALCITONIN (PCT): CPT

## 2018-11-11 PROCEDURE — 87086 URINE CULTURE/COLONY COUNT: CPT

## 2018-11-11 PROCEDURE — 87581 M.PNEUMON DNA AMP PROBE: CPT

## 2018-11-11 PROCEDURE — 87486 CHLMYD PNEUM DNA AMP PROBE: CPT

## 2018-11-11 PROCEDURE — 87798 DETECT AGENT NOS DNA AMP: CPT

## 2018-11-11 PROCEDURE — 82435 ASSAY OF BLOOD CHLORIDE: CPT

## 2018-11-11 PROCEDURE — 84295 ASSAY OF SERUM SODIUM: CPT

## 2018-11-11 PROCEDURE — 81001 URINALYSIS AUTO W/SCOPE: CPT

## 2018-11-11 PROCEDURE — 83874 ASSAY OF MYOGLOBIN: CPT

## 2018-11-11 PROCEDURE — 94640 AIRWAY INHALATION TREATMENT: CPT

## 2018-11-18 NOTE — H&P ADULT - NSHPREVIEWOFSYSTEMS_GEN_ALL_CORE
REVIEW OF SYSTEMS:  GEN: no fever,    no chills  RESP: no SOB,   no cough  CVS: no chest pain,   no palpitations  GI:  abdominal pain,   no nausea,   no vomiting,   no constipation,   no diarrhea  : no dysuria,   no frequency  NEURO: no headache,   no dizziness  PSYCH: no depression,   not anxious  Derm : no rash

## 2019-03-14 ENCOUNTER — INPATIENT (INPATIENT)
Facility: HOSPITAL | Age: 44
LOS: 0 days | Discharge: HOME | DRG: 694 | End: 2019-03-15
Attending: INTERNAL MEDICINE | Admitting: INTERNAL MEDICINE
Payer: COMMERCIAL

## 2019-03-14 VITALS
OXYGEN SATURATION: 97 % | TEMPERATURE: 98 F | DIASTOLIC BLOOD PRESSURE: 91 MMHG | SYSTOLIC BLOOD PRESSURE: 137 MMHG | HEART RATE: 63 BPM | HEIGHT: 68 IN | WEIGHT: 229.94 LBS | RESPIRATION RATE: 18 BRPM

## 2019-03-14 DIAGNOSIS — Z96.7 PRESENCE OF OTHER BONE AND TENDON IMPLANTS: Chronic | ICD-10-CM

## 2019-03-14 DIAGNOSIS — N20.0 CALCULUS OF KIDNEY: ICD-10-CM

## 2019-03-14 DIAGNOSIS — Z98.89 OTHER SPECIFIED POSTPROCEDURAL STATES: Chronic | ICD-10-CM

## 2019-03-14 LAB
ALBUMIN SERPL ELPH-MCNC: 4.4 G/DL — SIGNIFICANT CHANGE UP (ref 3.3–5)
ALP SERPL-CCNC: 80 U/L — SIGNIFICANT CHANGE UP (ref 40–120)
ALT FLD-CCNC: 27 U/L — SIGNIFICANT CHANGE UP (ref 10–45)
ANION GAP SERPL CALC-SCNC: 13 MMOL/L — SIGNIFICANT CHANGE UP (ref 5–17)
APPEARANCE UR: ABNORMAL
AST SERPL-CCNC: 20 U/L — SIGNIFICANT CHANGE UP (ref 10–40)
BACTERIA # UR AUTO: NEGATIVE — SIGNIFICANT CHANGE UP
BASOPHILS # BLD AUTO: 0 K/UL — SIGNIFICANT CHANGE UP (ref 0–0.2)
BASOPHILS NFR BLD AUTO: 0 % — SIGNIFICANT CHANGE UP (ref 0–2)
BILIRUB SERPL-MCNC: 0.7 MG/DL — SIGNIFICANT CHANGE UP (ref 0.2–1.2)
BILIRUB UR-MCNC: NEGATIVE — SIGNIFICANT CHANGE UP
BUN SERPL-MCNC: 20 MG/DL — SIGNIFICANT CHANGE UP (ref 7–23)
CALCIUM SERPL-MCNC: 9.4 MG/DL — SIGNIFICANT CHANGE UP (ref 8.4–10.5)
CHLORIDE SERPL-SCNC: 104 MMOL/L — SIGNIFICANT CHANGE UP (ref 96–108)
CO2 SERPL-SCNC: 22 MMOL/L — SIGNIFICANT CHANGE UP (ref 22–31)
COLOR SPEC: SIGNIFICANT CHANGE UP
CREAT SERPL-MCNC: 1.47 MG/DL — HIGH (ref 0.5–1.3)
DIFF PNL FLD: ABNORMAL
EOSINOPHIL # BLD AUTO: 0 K/UL — SIGNIFICANT CHANGE UP (ref 0–0.5)
EOSINOPHIL NFR BLD AUTO: 0.4 % — SIGNIFICANT CHANGE UP (ref 0–6)
EPI CELLS # UR: 0 /HPF — SIGNIFICANT CHANGE UP
GLUCOSE SERPL-MCNC: 118 MG/DL — HIGH (ref 70–99)
GLUCOSE UR QL: NEGATIVE — SIGNIFICANT CHANGE UP
HCT VFR BLD CALC: 40.9 % — SIGNIFICANT CHANGE UP (ref 39–50)
HGB BLD-MCNC: 14.6 G/DL — SIGNIFICANT CHANGE UP (ref 13–17)
HYALINE CASTS # UR AUTO: 0 /LPF — SIGNIFICANT CHANGE UP (ref 0–2)
KETONES UR-MCNC: ABNORMAL
LEUKOCYTE ESTERASE UR-ACNC: NEGATIVE — SIGNIFICANT CHANGE UP
LIDOCAIN IGE QN: 63 U/L — HIGH (ref 7–60)
LYMPHOCYTES # BLD AUTO: 1.6 K/UL — SIGNIFICANT CHANGE UP (ref 1–3.3)
LYMPHOCYTES # BLD AUTO: 18.4 % — SIGNIFICANT CHANGE UP (ref 13–44)
MCHC RBC-ENTMCNC: 29.9 PG — SIGNIFICANT CHANGE UP (ref 27–34)
MCHC RBC-ENTMCNC: 35.7 GM/DL — SIGNIFICANT CHANGE UP (ref 32–36)
MCV RBC AUTO: 83.7 FL — SIGNIFICANT CHANGE UP (ref 80–100)
MONOCYTES # BLD AUTO: 0.6 K/UL — SIGNIFICANT CHANGE UP (ref 0–0.9)
MONOCYTES NFR BLD AUTO: 6.3 % — SIGNIFICANT CHANGE UP (ref 2–14)
NEUTROPHILS # BLD AUTO: 6.6 K/UL — SIGNIFICANT CHANGE UP (ref 1.8–7.4)
NEUTROPHILS NFR BLD AUTO: 74.8 % — SIGNIFICANT CHANGE UP (ref 43–77)
NITRITE UR-MCNC: NEGATIVE — SIGNIFICANT CHANGE UP
PH UR: 5.5 — SIGNIFICANT CHANGE UP (ref 5–8)
PLATELET # BLD AUTO: 181 K/UL — SIGNIFICANT CHANGE UP (ref 150–400)
POTASSIUM SERPL-MCNC: 4.3 MMOL/L — SIGNIFICANT CHANGE UP (ref 3.5–5.3)
POTASSIUM SERPL-SCNC: 4.3 MMOL/L — SIGNIFICANT CHANGE UP (ref 3.5–5.3)
PROT SERPL-MCNC: 7.1 G/DL — SIGNIFICANT CHANGE UP (ref 6–8.3)
PROT UR-MCNC: ABNORMAL
RBC # BLD: 4.89 M/UL — SIGNIFICANT CHANGE UP (ref 4.2–5.8)
RBC # FLD: 13 % — SIGNIFICANT CHANGE UP (ref 10.3–14.5)
RBC CASTS # UR COMP ASSIST: 4 /HPF — SIGNIFICANT CHANGE UP (ref 0–4)
SODIUM SERPL-SCNC: 139 MMOL/L — SIGNIFICANT CHANGE UP (ref 135–145)
SP GR SPEC: 1.02 — SIGNIFICANT CHANGE UP (ref 1.01–1.02)
UROBILINOGEN FLD QL: NEGATIVE — SIGNIFICANT CHANGE UP
WBC # BLD: 8.8 K/UL — SIGNIFICANT CHANGE UP (ref 3.8–10.5)
WBC # FLD AUTO: 8.8 K/UL — SIGNIFICANT CHANGE UP (ref 3.8–10.5)
WBC UR QL: 1 /HPF — SIGNIFICANT CHANGE UP (ref 0–5)

## 2019-03-14 PROCEDURE — 99284 EMERGENCY DEPT VISIT MOD MDM: CPT | Mod: GC

## 2019-03-14 PROCEDURE — 74176 CT ABD & PELVIS W/O CONTRAST: CPT | Mod: 26

## 2019-03-14 RX ORDER — KETOROLAC TROMETHAMINE 30 MG/ML
15 SYRINGE (ML) INJECTION ONCE
Qty: 0 | Refills: 0 | Status: DISCONTINUED | OUTPATIENT
Start: 2019-03-14 | End: 2019-03-14

## 2019-03-14 RX ORDER — ONDANSETRON 8 MG/1
4 TABLET, FILM COATED ORAL ONCE
Qty: 0 | Refills: 0 | Status: COMPLETED | OUTPATIENT
Start: 2019-03-14 | End: 2019-03-14

## 2019-03-14 RX ORDER — PANTOPRAZOLE SODIUM 20 MG/1
1 TABLET, DELAYED RELEASE ORAL
Qty: 0 | Refills: 0 | COMMUNITY

## 2019-03-14 RX ORDER — MENTHOL AND METHYL SALICYLATE 10; 30 G/100G; G/100G
1 STICK TOPICAL
Qty: 0 | Refills: 0 | COMMUNITY

## 2019-03-14 RX ORDER — HEPARIN SODIUM 5000 [USP'U]/ML
5000 INJECTION INTRAVENOUS; SUBCUTANEOUS EVERY 12 HOURS
Qty: 0 | Refills: 0 | Status: DISCONTINUED | OUTPATIENT
Start: 2019-03-14 | End: 2019-03-15

## 2019-03-14 RX ORDER — OXYCODONE HYDROCHLORIDE 5 MG/1
1 TABLET ORAL
Qty: 0 | Refills: 0 | COMMUNITY

## 2019-03-14 RX ORDER — SODIUM CHLORIDE 9 MG/ML
1000 INJECTION INTRAMUSCULAR; INTRAVENOUS; SUBCUTANEOUS
Qty: 0 | Refills: 0 | Status: DISCONTINUED | OUTPATIENT
Start: 2019-03-14 | End: 2019-03-15

## 2019-03-14 RX ORDER — CYCLOBENZAPRINE HYDROCHLORIDE 10 MG/1
1 TABLET, FILM COATED ORAL
Qty: 0 | Refills: 0 | COMMUNITY

## 2019-03-14 RX ORDER — GABAPENTIN 400 MG/1
1 CAPSULE ORAL
Qty: 0 | Refills: 0 | COMMUNITY

## 2019-03-14 RX ORDER — PIPERACILLIN AND TAZOBACTAM 4; .5 G/20ML; G/20ML
3.38 INJECTION, POWDER, LYOPHILIZED, FOR SOLUTION INTRAVENOUS EVERY 8 HOURS
Qty: 0 | Refills: 0 | Status: DISCONTINUED | OUTPATIENT
Start: 2019-03-14 | End: 2019-03-15

## 2019-03-14 RX ORDER — SODIUM CHLORIDE 9 MG/ML
2000 INJECTION, SOLUTION INTRAVENOUS ONCE
Qty: 0 | Refills: 0 | Status: COMPLETED | OUTPATIENT
Start: 2019-03-14 | End: 2019-03-14

## 2019-03-14 RX ORDER — TAMSULOSIN HYDROCHLORIDE 0.4 MG/1
0.4 CAPSULE ORAL AT BEDTIME
Qty: 0 | Refills: 0 | Status: DISCONTINUED | OUTPATIENT
Start: 2019-03-14 | End: 2019-03-15

## 2019-03-14 RX ORDER — PANTOPRAZOLE SODIUM 20 MG/1
40 TABLET, DELAYED RELEASE ORAL DAILY
Qty: 0 | Refills: 0 | Status: DISCONTINUED | OUTPATIENT
Start: 2019-03-14 | End: 2019-03-15

## 2019-03-14 RX ORDER — HYDROMORPHONE HYDROCHLORIDE 2 MG/ML
0.5 INJECTION INTRAMUSCULAR; INTRAVENOUS; SUBCUTANEOUS EVERY 4 HOURS
Qty: 0 | Refills: 0 | Status: DISCONTINUED | OUTPATIENT
Start: 2019-03-14 | End: 2019-03-15

## 2019-03-14 RX ORDER — HYDROMORPHONE HYDROCHLORIDE 2 MG/ML
1 INJECTION INTRAMUSCULAR; INTRAVENOUS; SUBCUTANEOUS EVERY 6 HOURS
Qty: 0 | Refills: 0 | Status: DISCONTINUED | OUTPATIENT
Start: 2019-03-14 | End: 2019-03-15

## 2019-03-14 RX ORDER — IBUPROFEN 200 MG
400 TABLET ORAL ONCE
Qty: 0 | Refills: 0 | Status: COMPLETED | OUTPATIENT
Start: 2019-03-14 | End: 2019-03-14

## 2019-03-14 RX ORDER — IBUPROFEN 200 MG
0 TABLET ORAL
Qty: 0 | Refills: 0 | COMMUNITY

## 2019-03-14 RX ORDER — HYDROMORPHONE HYDROCHLORIDE 2 MG/ML
1 INJECTION INTRAMUSCULAR; INTRAVENOUS; SUBCUTANEOUS ONCE
Qty: 0 | Refills: 0 | Status: DISCONTINUED | OUTPATIENT
Start: 2019-03-14 | End: 2019-03-14

## 2019-03-14 RX ADMIN — Medication 15 MILLIGRAM(S): at 08:56

## 2019-03-14 RX ADMIN — ONDANSETRON 4 MILLIGRAM(S): 8 TABLET, FILM COATED ORAL at 14:40

## 2019-03-14 RX ADMIN — PIPERACILLIN AND TAZOBACTAM 25 GRAM(S): 4; .5 INJECTION, POWDER, LYOPHILIZED, FOR SOLUTION INTRAVENOUS at 15:22

## 2019-03-14 RX ADMIN — SODIUM CHLORIDE 2000 MILLILITER(S): 9 INJECTION, SOLUTION INTRAVENOUS at 10:00

## 2019-03-14 RX ADMIN — Medication 15 MILLIGRAM(S): at 09:29

## 2019-03-14 RX ADMIN — SODIUM CHLORIDE 70 MILLILITER(S): 9 INJECTION INTRAMUSCULAR; INTRAVENOUS; SUBCUTANEOUS at 18:32

## 2019-03-14 RX ADMIN — HEPARIN SODIUM 5000 UNIT(S): 5000 INJECTION INTRAVENOUS; SUBCUTANEOUS at 18:31

## 2019-03-14 RX ADMIN — HYDROMORPHONE HYDROCHLORIDE 1 MILLIGRAM(S): 2 INJECTION INTRAMUSCULAR; INTRAVENOUS; SUBCUTANEOUS at 11:34

## 2019-03-14 RX ADMIN — Medication 400 MILLIGRAM(S): at 21:46

## 2019-03-14 RX ADMIN — PANTOPRAZOLE SODIUM 40 MILLIGRAM(S): 20 TABLET, DELAYED RELEASE ORAL at 15:09

## 2019-03-14 RX ADMIN — ONDANSETRON 4 MILLIGRAM(S): 8 TABLET, FILM COATED ORAL at 08:56

## 2019-03-14 RX ADMIN — SODIUM CHLORIDE 70 MILLILITER(S): 9 INJECTION INTRAMUSCULAR; INTRAVENOUS; SUBCUTANEOUS at 11:34

## 2019-03-14 RX ADMIN — HYDROMORPHONE HYDROCHLORIDE 1 MILLIGRAM(S): 2 INJECTION INTRAMUSCULAR; INTRAVENOUS; SUBCUTANEOUS at 13:04

## 2019-03-14 RX ADMIN — HYDROMORPHONE HYDROCHLORIDE 1 MILLIGRAM(S): 2 INJECTION INTRAMUSCULAR; INTRAVENOUS; SUBCUTANEOUS at 08:59

## 2019-03-14 RX ADMIN — SODIUM CHLORIDE 4000 MILLILITER(S): 9 INJECTION, SOLUTION INTRAVENOUS at 08:56

## 2019-03-14 RX ADMIN — HYDROMORPHONE HYDROCHLORIDE 1 MILLIGRAM(S): 2 INJECTION INTRAMUSCULAR; INTRAVENOUS; SUBCUTANEOUS at 09:29

## 2019-03-14 RX ADMIN — Medication 400 MILLIGRAM(S): at 22:30

## 2019-03-14 RX ADMIN — PIPERACILLIN AND TAZOBACTAM 25 GRAM(S): 4; .5 INJECTION, POWDER, LYOPHILIZED, FOR SOLUTION INTRAVENOUS at 21:47

## 2019-03-14 RX ADMIN — TAMSULOSIN HYDROCHLORIDE 0.4 MILLIGRAM(S): 0.4 CAPSULE ORAL at 21:47

## 2019-03-14 NOTE — ED PROVIDER NOTE - ATTENDING CONTRIBUTION TO CARE
Patient is a 44 yo M with history of kidney stones here for right flank pain since last night around 3:30 AM. + nausea and multiple episodes of nbnb vomiting. Patient denies fevers, dysuria. He states it feels like a kidney stone.     VS noted  Gen. no acute distress, Non toxic   HEENT: EOMI, mmm,   Lungs: CTAB/L no C/ W /R   CVS: RRR   Abd; Soft non tender, non distended, right CVA tenderness  Ext: no edema  Skin: no rash  Neuro AAOx3 non focal clear speech  a/p: flank pain- likely kidney stone.will check labs, urinalysis, give pain medication. CT requested by Dr. Lee.    Sallie Montilla MD

## 2019-03-14 NOTE — H&P ADULT - NSHPREVIEWOFSYSTEMS_GEN_ALL_CORE
REVIEW OF SYSTEMS:  GEN: no fever,    no chills  RESP: no SOB,   no cough  CVS: no chest pain,   no palpitations  GI: abdominal pain,   no nausea,   no vomiting,   no constipation,   no diarrhea  : no dysuria,   no frequency  NEURO: no headache,   no dizziness  PSYCH: no depression,   not anxious  Derm : no rash

## 2019-03-14 NOTE — ED PROVIDER NOTE - PROGRESS NOTE DETAILS
Eladia RILEY: Called by Dr. Bria Lee to admit patient, however, Dr. Villarreal at bedside stating do not admit yet. He plans to talk to Dr. Lee. Patient has a hx of septic stone. Will get CT, labs, u/a, pain control. Eladia RILEY: Discussed with Dr. Lee and Dr. Villarreal. Recommending admission secondary to stranding CT Scan. Eladia RILEY: Discussed with Dr. Lee and Dr. Villarreal. Recommending admission secondary to stranding on CT Scan and patient's hx of septic stone

## 2019-03-14 NOTE — ED PROVIDER NOTE - OBJECTIVE STATEMENT
43yom w/ hx of kidney stone had a sudden onset of sharp bilateral flank pain but worse on the right around 330 in the AM without inciting event, constant since onset, associated with nausea and nonbilious vomiting, as well as mild dysuria. No fevers, or chills. Similar pain to prior kidney stone.

## 2019-03-14 NOTE — H&P ADULT - HISTORY OF PRESENT ILLNESS
43y Male complaining of R flank pain	    42yo M    presents with R flank pain. h/o right foot  drop/  lumbar diskectomy/ ankle  surgery on   10/23,  pt was seen in the ER and found to have R sided kidney stone,  , then on  10/24,    pt was   admitted for multifocal PNA,   now  in er   with   extreme R   flank pain   was  seen by urology on last  admission  no intervention/  DR VAZQUEZ

## 2019-03-14 NOTE — H&P ADULT - NSHPPHYSICALEXAM_GEN_ALL_CORE
PHYSICAL EXAMINATION:  Vital Signs Last 24 Hrs  T(C): 36.7 (14 Mar 2019 07:59), Max: 36.7 (14 Mar 2019 07:59)  T(F): 98.1 (14 Mar 2019 07:59), Max: 98.1 (14 Mar 2019 07:59)  HR: 63 (14 Mar 2019 07:59) (63 - 63)  BP: 137/91 (14 Mar 2019 07:59) (137/91 - 137/91)  BP(mean): --  RR: 18 (14 Mar 2019 07:59) (18 - 18)  SpO2: 97% (14 Mar 2019 07:59) (97% - 97%)  CAPILLARY BLOOD GLUCOSE            GENERAL: NAD, well-groomed,  HEAD:  atraumatic, normocephalic  EYES: sclera anicteric  ENMT: mucous membranes moist  NECK: supple, No JVD  CHEST/LUNG: clear to auscultation bilaterally;    no      rales   ,   no rhonchi,   HEART: normal S1, S2  ABDOMEN: BS+, soft, ND,  right flank tenderness  EXTREMITIES:    no    edema    b/l LEs  NEURO: awake, ,     moves all extremities  SKIN: no     rash

## 2019-03-14 NOTE — ED ADULT NURSE NOTE - OBJECTIVE STATEMENT
Patient with history of kidney stone came in because of a sudden onset of sharp bilateral flank pain but worse on the right around 330 in the AM without inciting event, constant since onset, associated with nausea and nonbilious vomiting, as well as mild dysuria. No fevers, or chills. Similar pain to prior kidney stone. Patient's skin warm and dry to touch. No chills. No diaphoresis. Pt ambulatory with a cane Pt very anxious. Emotional support offered.

## 2019-03-14 NOTE — H&P ADULT - ASSESSMENT
pt  with  h/o  fx  right  ankle a year  ago/  h/o  spinal  stenosis,,  right  foot  drop,  renal stone.  CAP.      now admitted  with   right flank pain   denies    fevers/  chills/ dysuria/ cp/sob   urology  evdr darlin lawler  pain meds   in er  , labs pending    ct  abdomen  pending   dvt ppx/ iv fluids/ pain meds      < from: CT Abdomen and Pelvis No Cont (11.01.18 @ 18:11) >  IMPRESSION: 3 mm calculus in the distal right ureter with associated mild   right hydronephrosis.  < end of copied text >      < from: CT Abdomen and Pelvis No Cont (10.23.18 @ 21:46) >  ADRENALS: Unremarkable.  KIDNEYS/URETERS: Minimal right perinephric stranding without   hydroureteronephrosis or obstructing radiopaque urinary tract stone.   Punctate 0.2 cm nonobstructing right lower pole renal stone. Duplicated   right renal collecting system.  < end of copied text >  l  < from: CT Chest No Cont (10.27.18 @ 15:49) >  IMPRESSION: Multifocal pneumonia as above.  < end of copied text > pt  with  h/o  fx  right  ankle a year  ago/  h/o  spinal  stenosis,,  right  foot  drop,  renal stone.  CAP.      now admitted  with   right flank pain   denies    fevers/  chills/ dysuria/ cp/sob   urology  evaldr saeed  pain meds   in er  , labs pending    ct  abdomen   , noted, mild right hydro/ 2  mm  stone at R, UVJ  dvt ppx/ iv fluids/ pain meds  pt wants  admission  due  to pain         < from: CT Abdomen and Pelvis No Cont (03.14.19 @ 09:46) >  IMPRESSION:   Mild right hydronephrosis and perinephric stranding secondary to a 2 mm   calculus at the right UVJ.  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  < end of copied text >        < from: CT Abdomen and Pelvis No Cont (11.01.18 @ 18:11) >  IMPRESSION: 3 mm calculus in the distal right ureter with associated mild   right hydronephrosis.  < end of copied text >    l  < from: CT Chest No Cont (10.27.18 @ 15:49) >  IMPRESSION: Multifocal pneumonia as above.  < end of copied text >

## 2019-03-14 NOTE — ED PROVIDER NOTE - CLINICAL SUMMARY MEDICAL DECISION MAKING FREE TEXT BOX
flank pain c/w renal colic, no septic symptoms. Plan for pain control, fluids, urinalysis. Dispo pending imaging and labs.

## 2019-03-14 NOTE — ED ADULT NURSE NOTE - NSIMPLEMENTINTERV_GEN_ALL_ED
Implemented All Universal Safety Interventions:  [place] to call system. Call bell, personal items and telephone within reach. Instruct patient to call for assistance. Room bathroom lighting operational. Non-slip footwear when patient is off stretcher. Physically safe environment: no spills, clutter or unnecessary equipment. Stretcher in lowest position, wheels locked, appropriate side rails in place.

## 2019-03-14 NOTE — H&P ADULT - NSICDXPASTSURGICALHX_GEN_ALL_CORE_FT
PAST SURGICAL HISTORY:  H/O lumbar discectomy     S/P ORIF (open reduction internal fixation) fracture of the right ankle in Jan 2016

## 2019-03-14 NOTE — PATIENT PROFILE ADULT - OVER THE PAST TWO WEEKS, HAVE YOU FELT LITTLE INTEREST OR PLEASURE IN DOING THINGS?
Step 1:  Wear the CPAP mask at home while awake for one hour each day. Practice breathing through the mask while watching television, reading, or performing another sedentary activity that keeps your mind off your anxiety.     Step 2: Use the CPAP mask  during scheduled one hour naps at home.     Step 3: Use CPAP mask  during the initial 3-4 hours of nocturnal sleep.     Step 4: Use CPAP mask  through the entire night of sleep.    
no

## 2019-03-15 ENCOUNTER — TRANSCRIPTION ENCOUNTER (OUTPATIENT)
Age: 44
End: 2019-03-15

## 2019-03-15 VITALS
OXYGEN SATURATION: 98 % | TEMPERATURE: 98 F | HEART RATE: 66 BPM | DIASTOLIC BLOOD PRESSURE: 60 MMHG | SYSTOLIC BLOOD PRESSURE: 95 MMHG | RESPIRATION RATE: 18 BRPM

## 2019-03-15 LAB
CULTURE RESULTS: NO GROWTH — SIGNIFICANT CHANGE UP
SPECIMEN SOURCE: SIGNIFICANT CHANGE UP

## 2019-03-15 PROCEDURE — 80053 COMPREHEN METABOLIC PANEL: CPT

## 2019-03-15 PROCEDURE — 85027 COMPLETE CBC AUTOMATED: CPT

## 2019-03-15 PROCEDURE — 96375 TX/PRO/DX INJ NEW DRUG ADDON: CPT

## 2019-03-15 PROCEDURE — 87086 URINE CULTURE/COLONY COUNT: CPT

## 2019-03-15 PROCEDURE — 96361 HYDRATE IV INFUSION ADD-ON: CPT

## 2019-03-15 PROCEDURE — 74176 CT ABD & PELVIS W/O CONTRAST: CPT

## 2019-03-15 PROCEDURE — 76770 US EXAM ABDO BACK WALL COMP: CPT

## 2019-03-15 PROCEDURE — 96374 THER/PROPH/DIAG INJ IV PUSH: CPT

## 2019-03-15 PROCEDURE — 83690 ASSAY OF LIPASE: CPT

## 2019-03-15 PROCEDURE — 81001 URINALYSIS AUTO W/SCOPE: CPT

## 2019-03-15 PROCEDURE — 82248 BILIRUBIN DIRECT: CPT

## 2019-03-15 PROCEDURE — 99285 EMERGENCY DEPT VISIT HI MDM: CPT | Mod: 25

## 2019-03-15 RX ORDER — OXYCODONE HYDROCHLORIDE 5 MG/1
1 TABLET ORAL
Qty: 6 | Refills: 0 | OUTPATIENT
Start: 2019-03-15 | End: 2019-03-17

## 2019-03-15 RX ORDER — IBUPROFEN 200 MG
400 TABLET ORAL ONCE
Qty: 0 | Refills: 0 | Status: COMPLETED | OUTPATIENT
Start: 2019-03-15 | End: 2019-03-15

## 2019-03-15 RX ORDER — TAMSULOSIN HYDROCHLORIDE 0.4 MG/1
1 CAPSULE ORAL
Qty: 0 | Refills: 0 | COMMUNITY
Start: 2019-03-15

## 2019-03-15 RX ORDER — TAMSULOSIN HYDROCHLORIDE 0.4 MG/1
1 CAPSULE ORAL
Qty: 30 | Refills: 0 | OUTPATIENT
Start: 2019-03-15 | End: 2019-04-13

## 2019-03-15 RX ADMIN — Medication 400 MILLIGRAM(S): at 08:42

## 2019-03-15 RX ADMIN — Medication 400 MILLIGRAM(S): at 09:12

## 2019-03-15 RX ADMIN — SODIUM CHLORIDE 70 MILLILITER(S): 9 INJECTION INTRAMUSCULAR; INTRAVENOUS; SUBCUTANEOUS at 06:19

## 2019-03-15 RX ADMIN — PIPERACILLIN AND TAZOBACTAM 25 GRAM(S): 4; .5 INJECTION, POWDER, LYOPHILIZED, FOR SOLUTION INTRAVENOUS at 06:18

## 2019-03-15 NOTE — DISCHARGE NOTE PROVIDER - CARE PROVIDERS DIRECT ADDRESSES
[email or web address]

## 2019-03-15 NOTE — DISCHARGE NOTE NURSING/CASE MANAGEMENT/SOCIAL WORK - NSDCPEWEB_GEN_ALL_CORE
[place] website --- [email or web address] for Tobacco Control website --- [email or web address]

## 2019-03-15 NOTE — DISCHARGE NOTE PROVIDER - HOSPITAL COURSE
44yo M    presents with R flank pain.    h/o right foot  drop/  lumbar diskectomy/ ankle  surgery    on   10/23,  pt was seen in the ER and found to have R sided kidney stone,     , then on  10/24,    pt was   admitted for multifocal PNA,      now  in er   with   extreme R   flank pain      was  seen by urology on last  admission  no intervention/  DR VAZQUEZ

## 2019-03-15 NOTE — DISCHARGE NOTE PROVIDER - CARE PROVIDER_API CALL
Wilmer Massey (MD)  Urology  [address], [address]  [place] [address]  Phone: [phone]  Fax: [phone]  Follow Up Time:     Mat Villarreal (DO)  Cardiology Medicine  [address] [place] [address], [address]  [place] [address]  Phone: [phone]  Fax: [phone]  Follow Up Time:

## 2019-03-15 NOTE — PROGRESS NOTE ADULT - SUBJECTIVE AND OBJECTIVE BOX
better    REVIEW OF SYSTEMS:  GEN: no fever,    no chills  RESP: no SOB,   no cough  CVS: no chest pain,   no palpitations  GI: no abdominal pain,   no nausea,   no vomiting,   no constipation,   no diarrhea  : no dysuria,   no frequency  NEURO: no headache,   no dizziness  PSYCH: no depression,   not anxious  Derm : no rash    MEDICATIONS  (STANDING):  amoxicillin  875 milliGRAM(s)/clavulanate 1 Tablet(s) Oral two times a day  pantoprazole    Tablet 40 milliGRAM(s) Oral daily  tamsulosin 0.4 milliGRAM(s) Oral at bedtime    MEDICATIONS  (PRN):  HYDROmorphone  Injectable 1 milliGRAM(s) IV Push every 6 hours PRN Severe Pain (7 - 10)  HYDROmorphone  Injectable 0.5 milliGRAM(s) IV Push every 4 hours PRN Moderate Pain (4 - 6)      Vital Signs Last 24 Hrs  T(C): 36.5 (15 Mar 2019 04:25), Max: 36.8 (14 Mar 2019 20:48)  T(F): 97.7 (15 Mar 2019 04:25), Max: 98.2 (14 Mar 2019 20:48)  HR: 66 (15 Mar 2019 04:25) (61 - 70)  BP: 95/60 (15 Mar 2019 04:25) (95/60 - 121/79)  BP(mean): --  RR: 18 (15 Mar 2019 04:25) (17 - 19)  SpO2: 98% (15 Mar 2019 04:25) (97% - 98%)  CAPILLARY BLOOD GLUCOSE        I&O's Summary    14 Mar 2019 07:01  -  15 Mar 2019 07:00  --------------------------------------------------------  IN: 1520 mL / OUT: 450 mL / NET: 1070 mL        PHYSICAL EXAM:  HEAD:  Atraumatic, Normocephalic  NECK: Supple, No   JVD  CHEST/LUNG:   no     rales,     no,    rhonchi  HEART: Regular rate and rhythm;         murmur  ABDOMEN: Soft, Nontender, ;   EXTREMITIES:     no   edema  NEUROLOGY:  alert    LABS:                        14.6   8.8   )-----------( 181      ( 14 Mar 2019 09:10 )             40.9     03-14    139  |  104  |  20  ----------------------------<  118<H>  4.3   |  22  |  1.47<H>    Ca    9.4      14 Mar 2019 09:10    TPro  7.1  /  Alb  4.4  /  TBili  0.7  /  DBili  0.1  /  AST  20  /  ALT  27  /  AlkPhos  80            Urinalysis Basic - ( 14 Mar 2019 11:49 )    Color: Light Yellow / Appearance: Slightly Turbid / S.024 / pH: x  Gluc: x / Ketone: Small  / Bili: Negative / Urobili: Negative   Blood: x / Protein: Trace / Nitrite: Negative   Leuk Esterase: Negative / RBC: 4 /hpf / WBC 1 /HPF   Sq Epi: x / Non Sq Epi: 0 /hpf / Bacteria: Negative                    Culture - Urine (collected 19 @ 15:21)  Source: .Urine Clean Catch (Midstream)  Final Report (03-15-19 @ 12:07):    No growth        Consultant(s) Notes Reviewed:      Care Discussed with Consultants/Other Providers:

## 2019-03-15 NOTE — DISCHARGE NOTE PROVIDER - NSDCCPCAREPLAN_GEN_ALL_CORE_FT
PRINCIPAL DISCHARGE DIAGNOSIS  Diagnosis: Kidney stone  Assessment and Plan of Treatment: follow up with urology within 3-5 days of discharge  stay well hydrated      SECONDARY DISCHARGE DIAGNOSES  Diagnosis: Flank pain  Assessment and Plan of Treatment: resolving

## 2019-03-15 NOTE — PROGRESS NOTE ADULT - ASSESSMENT
pt  with  h/o  fx  right  ankle a year  ago/  h/o  spinal  stenosis,,  right  foot  drop,  renal stone.  CAP.      now admitted  with   right flank pain   denies    fevers/  chills/ dysuria/ cp/sob   urology  eval, dr saeed      ct  abdomen   , noted, mild right hydro/ 2  mm  stone at R, UVJ  passed stone   dc  home/ on augmentin         < from: CT Abdomen and Pelvis No Cont (03.14.19 @ 09:46) >  IMPRESSION:   Mild right hydronephrosis and perinephric stranding secondary to a 2 mm   calculus at the right UVJ.  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  < end of copied text >        < from: CT Abdomen and Pelvis No Cont (11.01.18 @ 18:11) >  IMPRESSION: 3 mm calculus in the distal right ureter with associated mild   right hydronephrosis.  < end of copied text >    l  < from: CT Chest No Cont (10.27.18 @ 15:49) >  IMPRESSION: Multifocal pneumonia as above.  < end of copied text >

## 2019-03-15 NOTE — DISCHARGE NOTE NURSING/CASE MANAGEMENT/SOCIAL WORK - NSDCDPATPORTLINK_GEN_ALL_CORE
You can access the [facility] Portal, offered by [facility], by registering with the following website: [email or web address]

## 2019-04-09 ENCOUNTER — APPOINTMENT (OUTPATIENT)
Dept: SPINE | Facility: CLINIC | Age: 44
End: 2019-04-09
Payer: MEDICARE

## 2019-04-09 DIAGNOSIS — M54.16 RADICULOPATHY, LUMBAR REGION: ICD-10-CM

## 2019-04-09 DIAGNOSIS — Z91.81 HISTORY OF FALLING: ICD-10-CM

## 2019-04-09 PROBLEM — N20.0 CALCULUS OF KIDNEY: Chronic | Status: ACTIVE | Noted: 2019-03-14

## 2019-04-09 PROCEDURE — 99203 OFFICE O/P NEW LOW 30 MIN: CPT

## 2019-04-09 RX ORDER — GABAPENTIN 100 MG/1
CAPSULE ORAL
Refills: 0 | Status: ACTIVE | COMMUNITY

## 2019-04-09 RX ORDER — IBUPROFEN 200 MG/1
TABLET, FILM COATED ORAL
Refills: 0 | Status: ACTIVE | COMMUNITY

## 2019-04-09 RX ORDER — CYCLOBENZAPRINE HCL 5 MG
TABLET ORAL
Refills: 0 | Status: ACTIVE | COMMUNITY

## 2019-04-09 RX ORDER — NAPROXEN 500 MG/1
500 TABLET, DELAYED RELEASE ORAL
Refills: 0 | Status: ACTIVE | COMMUNITY

## 2019-04-09 RX ORDER — OXYCODONE HYDROCHLORIDE 30 MG/1
TABLET ORAL
Refills: 0 | Status: ACTIVE | COMMUNITY

## 2019-04-10 ENCOUNTER — APPOINTMENT (OUTPATIENT)
Dept: MRI IMAGING | Facility: CLINIC | Age: 44
End: 2019-04-10
Payer: MEDICARE

## 2019-04-10 ENCOUNTER — OUTPATIENT (OUTPATIENT)
Dept: OUTPATIENT SERVICES | Facility: HOSPITAL | Age: 44
LOS: 1 days | End: 2019-04-10
Payer: MEDICARE

## 2019-04-10 DIAGNOSIS — Z96.7 PRESENCE OF OTHER BONE AND TENDON IMPLANTS: Chronic | ICD-10-CM

## 2019-04-10 DIAGNOSIS — Z00.8 ENCOUNTER FOR OTHER GENERAL EXAMINATION: ICD-10-CM

## 2019-04-10 DIAGNOSIS — Z98.89 OTHER SPECIFIED POSTPROCEDURAL STATES: Chronic | ICD-10-CM

## 2019-04-10 DIAGNOSIS — M54.16 RADICULOPATHY, LUMBAR REGION: ICD-10-CM

## 2019-04-10 PROCEDURE — 72148 MRI LUMBAR SPINE W/O DYE: CPT | Mod: 26

## 2019-04-10 PROCEDURE — 72148 MRI LUMBAR SPINE W/O DYE: CPT

## 2019-04-10 PROCEDURE — 72146 MRI CHEST SPINE W/O DYE: CPT | Mod: 26

## 2019-04-10 PROCEDURE — 72146 MRI CHEST SPINE W/O DYE: CPT

## 2019-04-10 NOTE — REASON FOR VISIT
[New Patient Visit] : a new patient visit [Referred By: _________] : Patient was referred by PAT [Family Member] : family member [FreeTextEntry1] : Lumbar Radiculopathy

## 2019-04-10 NOTE — ASSESSMENT
[FreeTextEntry1] : Assess:\par Lumbar Radiculopathy\par Left foot drop\par \par \par \par PLAN:\par MRI of lumbar spine\par MRI of thoracic spine \par Return after image obtained

## 2019-04-10 NOTE — HISTORY OF PRESENT ILLNESS
[FreeTextEntry1] : Lumbar radiculopathy  [de-identified] : This is a 43 year old male with a three year history of lower back pain and recent fall.  He required trauma- orthopedic surgery at the time of his trauma in 2016.  His orthopedic surgery required surgery of his left leg due to tendon damage, however he began to note left lower extremity pain and a foot drop with an increase of lower back pain.   His pain is 10/10.  He has difficulty walking and performing his routine activity.   The pain radiates bilaterally into his legs and has increased pain upon sitting.

## 2019-04-10 NOTE — HISTORY OF PRESENT ILLNESS
[FreeTextEntry1] : Lumbar radiculopathy  [de-identified] : This is a 43 year old male with a three year history of lower back pain and recent fall.  He required trauma- orthopedic surgery at the time of his trauma in 2016.  His orthopedic surgery required surgery of his left leg due to tendon damage, however he began to note left lower extremity pain and a foot drop with an increase of lower back pain.   His pain is 10/10.  He has difficulty walking and performing his routine activity.   The pain radiates bilaterally into his legs and has increased pain upon sitting.

## 2019-04-10 NOTE — REVIEW OF SYSTEMS
[Negative] : Heme/Lymph [de-identified] : lower back pain, left leg pain and difficulty walking  [FreeTextEntry1] : No bowel or bladder issues

## 2019-04-10 NOTE — REVIEW OF SYSTEMS
[Negative] : Heme/Lymph [de-identified] : lower back pain, left leg pain and difficulty walking  [FreeTextEntry1] : No bowel or bladder issues

## 2019-04-17 DIAGNOSIS — M51.24 OTHER INTERVERTEBRAL DISC DISPLACEMENT, THORACIC REGION: ICD-10-CM

## 2019-04-17 DIAGNOSIS — M43.17 SPONDYLOLISTHESIS, LUMBOSACRAL REGION: ICD-10-CM

## 2019-04-17 DIAGNOSIS — M51.26 OTHER INTERVERTEBRAL DISC DISPLACEMENT, LUMBAR REGION: ICD-10-CM

## 2019-04-17 DIAGNOSIS — M51.27 OTHER INTERVERTEBRAL DISC DISPLACEMENT, LUMBOSACRAL REGION: ICD-10-CM

## 2019-05-14 ENCOUNTER — APPOINTMENT (OUTPATIENT)
Dept: SPINE | Facility: CLINIC | Age: 44
End: 2019-05-14
Payer: MEDICARE

## 2019-05-14 VITALS
DIASTOLIC BLOOD PRESSURE: 90 MMHG | HEIGHT: 68 IN | BODY MASS INDEX: 40.16 KG/M2 | WEIGHT: 265 LBS | SYSTOLIC BLOOD PRESSURE: 140 MMHG

## 2019-05-14 PROCEDURE — 99213 OFFICE O/P EST LOW 20 MIN: CPT

## 2019-05-20 NOTE — REASON FOR VISIT
[Follow-Up: _____] : a [unfilled] follow-up visit [Other: _____] : [unfilled] [FreeTextEntry1] : 43 year old male who sustained an injury years ago and  suffers from  lumbar radiculopathy and myelopathy with a foot drag.  He uses a cane for ambulation and  leg brace.  Reported back and leg pain has not changed in nature or intensity but is disabling and preventing him form work.  He has updated MRI for review.

## 2019-05-20 NOTE — ASSESSMENT
[FreeTextEntry1] : Assess:\par long standing lumbar radiculopathy / Myelopathy\par Foot drag \par \par \par PLAN\par Discussed lumbar fusion \par Patient will call if he decides to move forward with surgery \par In the meantime he will attend PT, aquatic therapy and pain management

## 2019-06-25 ENCOUNTER — APPOINTMENT (OUTPATIENT)
Dept: SPINE | Facility: CLINIC | Age: 44
End: 2019-06-25
Payer: MEDICARE

## 2019-06-25 VITALS
RESPIRATION RATE: 16 BRPM | WEIGHT: 250 LBS | DIASTOLIC BLOOD PRESSURE: 78 MMHG | HEIGHT: 68 IN | TEMPERATURE: 98.4 F | HEART RATE: 72 BPM | BODY MASS INDEX: 37.89 KG/M2 | SYSTOLIC BLOOD PRESSURE: 118 MMHG

## 2019-06-25 PROCEDURE — 99213 OFFICE O/P EST LOW 20 MIN: CPT

## 2019-06-25 RX ORDER — POTASSIUM CHLORIDE 750 MG/1
10 TABLET, FILM COATED, EXTENDED RELEASE ORAL
Refills: 0 | Status: ACTIVE | COMMUNITY

## 2019-06-25 NOTE — CONSULT LETTER
[Dear  ___] : Dear  [unfilled], [Sincerely,] : Sincerely, [FreeTextEntry3] : Favio Low,DO\par [facility] [place]\par [address]\[place] [address]\par Phone: [phone]\par Fax: [phone]\par  [FreeTextEntry2] : Elsa Reyes,MD\par

## 2019-06-25 NOTE — PHYSICAL EXAM
[General Appearance - Well Nourished] : well nourished [General Appearance - Alert] : alert [General Appearance - Well-Appearing] : healthy appearing [Cranial Nerves Optic (II)] : visual acuity intact bilaterally,  pupils equal round and reactive to light [Cranial Nerves Oculomotor (III)] : extraocular motion intact [Cranial Nerves Trigeminal (V)] : facial sensation intact symmetrically [Cranial Nerves Vestibulocochlear (VIII)] : hearing was intact bilaterally [Cranial Nerves Glossopharyngeal (IX)] : tongue and palate midline [Cranial Nerves Facial (VII)] : face symmetrical [Cranial Nerves Hypoglossal (XII)] : there was no tongue deviation with protrusion [Cranial Nerves Accessory (XI - Cranial And Spinal)] : head turning and shoulder shrug symmetric [Motor Tone] : muscle tone was normal in all four extremities [5] : C5 Biceps 5/5 [4] : L3 quadriceps 4/5 [0] : L4/5 heel walking 0/5  [1] : S1 ankle flexors 1/5 [Dysesthesia] : dysesthesia was present [Limited Balance] : the patient's balance was impaired [2+] : Triceps right 2+ [1+] : Patella left 1+ [Muscle Spasms, Bilateral] : bilateral muscle spasms [Restricted] : was restricted [Pain] : was painful

## 2019-06-25 NOTE — REASON FOR VISIT
[Follow-Up: _____] : a [unfilled] follow-up visit [FreeTextEntry1] : Pt is here today. Pt has been doing PT. No imaging for review.

## 2019-07-22 ENCOUNTER — APPOINTMENT (OUTPATIENT)
Dept: RADIOLOGY | Facility: CLINIC | Age: 44
End: 2019-07-22
Payer: MEDICARE

## 2019-07-22 ENCOUNTER — OUTPATIENT (OUTPATIENT)
Dept: OUTPATIENT SERVICES | Facility: HOSPITAL | Age: 44
LOS: 1 days | End: 2019-07-22
Payer: MEDICARE

## 2019-07-22 DIAGNOSIS — M54.16 RADICULOPATHY, LUMBAR REGION: ICD-10-CM

## 2019-07-22 DIAGNOSIS — Z98.89 OTHER SPECIFIED POSTPROCEDURAL STATES: Chronic | ICD-10-CM

## 2019-07-22 DIAGNOSIS — Z96.7 PRESENCE OF OTHER BONE AND TENDON IMPLANTS: Chronic | ICD-10-CM

## 2019-07-22 PROCEDURE — 72110 X-RAY EXAM L-2 SPINE 4/>VWS: CPT

## 2019-07-22 PROCEDURE — 72110 X-RAY EXAM L-2 SPINE 4/>VWS: CPT | Mod: 26

## 2019-07-23 ENCOUNTER — APPOINTMENT (OUTPATIENT)
Dept: SPINE | Facility: CLINIC | Age: 44
End: 2019-07-23
Payer: MEDICARE

## 2019-07-23 VITALS
SYSTOLIC BLOOD PRESSURE: 104 MMHG | BODY MASS INDEX: 36.07 KG/M2 | HEART RATE: 75 BPM | WEIGHT: 238 LBS | HEIGHT: 68 IN | DIASTOLIC BLOOD PRESSURE: 70 MMHG

## 2019-07-23 DIAGNOSIS — G95.9 DISEASE OF SPINAL CORD, UNSPECIFIED: ICD-10-CM

## 2019-07-23 DIAGNOSIS — M54.16 RADICULOPATHY, LUMBAR REGION: ICD-10-CM

## 2019-07-23 DIAGNOSIS — Z87.891 PERSONAL HISTORY OF NICOTINE DEPENDENCE: ICD-10-CM

## 2019-07-23 PROCEDURE — 99213 OFFICE O/P EST LOW 20 MIN: CPT

## 2019-07-25 NOTE — ASSESSMENT
[FreeTextEntry1] : Assess:\par Lumbar back pain and radiculopathy s/p fall \par Chronic lower back pain refractory to conservative measures \par Retrolisthesis noted of L5 on x ray \par \par PLAN:\par L5 S 1 interbody fusion recommended\par Patient will call when he is ready to schedule surgery  \par All risk and benefits were discussed   \par Smoking cessation discussed and to discontinue narcotic use prior to OR\par Patient will have a second opinion from Dr. Wong in the upcoming two weeks

## 2019-07-25 NOTE — REASON FOR VISIT
[Follow-Up: _____] : a [unfilled] follow-up visit [Other: _____] : [unfilled] [FreeTextEntry1] : 43 year old male who presents with a history of lower back pain for a number of years after a fall who failed conservative therapy.  He has undergone PT and pain management in the past without relief.  Currently, he is having difficulty with walking and the pain is exacerbated when sitting and standing.   In addition, he has difficulty ambulating.  The patient reports progression of his pain over the last month and increased weakness of his lower extremities.  Presently he requires a cane for ambulation.  A flexion/extension lumbar x ray will be reviewed today.

## 2019-07-25 NOTE — DATA REVIEWED
[de-identified] : Lumbar flexion/extension x ray from  [facility]  7/22/2019

## 2019-07-29 ENCOUNTER — APPOINTMENT (OUTPATIENT)
Dept: SPINE | Facility: CLINIC | Age: 44
End: 2019-07-29

## 2019-10-14 ENCOUNTER — TRANSCRIPTION ENCOUNTER (OUTPATIENT)
Age: 44
End: 2019-10-14

## 2019-10-22 ENCOUNTER — OTHER (OUTPATIENT)
Age: 44
End: 2019-10-22

## 2020-02-21 NOTE — ED ADULT NURSE NOTE - GENITOURINARY WDL
Per Dr. Sarah Fountain, [facility] staff will provide out pt resources for pt to help with her anxiety that will be in her discharge paperwork.       Antony Olvera RN  02/21/20 9284 Bladder non-tender and non-distended. Urine clear yellow.

## 2020-03-23 ENCOUNTER — INPATIENT (INPATIENT)
Facility: HOSPITAL | Age: 45
LOS: 7 days | Discharge: HOME | DRG: 193 | End: 2020-03-31
Attending: HOSPITALIST | Admitting: HOSPITALIST
Payer: MEDICARE

## 2020-03-23 VITALS
HEART RATE: 93 BPM | TEMPERATURE: 102 F | HEIGHT: 68 IN | SYSTOLIC BLOOD PRESSURE: 100 MMHG | DIASTOLIC BLOOD PRESSURE: 65 MMHG | OXYGEN SATURATION: 96 % | WEIGHT: 240.08 LBS | RESPIRATION RATE: 20 BRPM

## 2020-03-23 DIAGNOSIS — Z96.7 PRESENCE OF OTHER BONE AND TENDON IMPLANTS: Chronic | ICD-10-CM

## 2020-03-23 DIAGNOSIS — Z98.89 OTHER SPECIFIED POSTPROCEDURAL STATES: Chronic | ICD-10-CM

## 2020-03-23 PROCEDURE — 99285 EMERGENCY DEPT VISIT HI MDM: CPT | Mod: GC

## 2020-03-23 NOTE — ED ADULT TRIAGE NOTE - HEIGHT IN INCHES
Patient Information     Patient Name MRN Sex Cecilio Contreras [id number] Male 1956      Telephone Encounter by America Ladd RN at 2017 10:55 AM     Author:  America Ladd RN Service:  (none) Author Type:  NURS- Registered Nurse     Filed:  2017 10:56 AM Encounter Date:  2017 Status:  Signed     :  America Ladd RN (NURS- Registered Nurse)            A[employer]itors    Office visit in the past 12 months or per provider note.    Last visit with ROSARIO ALDRICH was on: 2017 in [facility]  Next visit with ROSARIO ALDRICH is on: No future appointment listed with this provider  Next visit with Internal Medicine is on: No future appointment listed in this department    Lab test requirements:  Creatinine and Potassium annually, if ordering lab, order BMP.  CREATININE (mg/dL)    Date Value   2017 0.90     POTASSIUM (mmol/L)    Date Value   2017 4.1       Max refill for 12 months from last office visit or per provider note  Prescription refilled per RN Medication Refill Policy.................... AMERICA LADD RN ....................  2017   10:56 AM              
8

## 2020-03-23 NOTE — ED ADULT TRIAGE NOTE - CHIEF COMPLAINT QUOTE
no recent travel or sick contacts; c/o dizziness, HA, sob, nausea and vomit; unable to eat, body aches; Covid tested but no results yet; brought in by EMS due to low o2 sat at home of 90% and sob; oxygen placed by EMS

## 2020-03-24 DIAGNOSIS — M54.9 DORSALGIA, UNSPECIFIED: ICD-10-CM

## 2020-03-24 DIAGNOSIS — Z02.9 ENCOUNTER FOR ADMINISTRATIVE EXAMINATIONS, UNSPECIFIED: ICD-10-CM

## 2020-03-24 DIAGNOSIS — E87.6 HYPOKALEMIA: ICD-10-CM

## 2020-03-24 DIAGNOSIS — R06.02 SHORTNESS OF BREATH: ICD-10-CM

## 2020-03-24 DIAGNOSIS — Z96.7 PRESENCE OF OTHER BONE AND TENDON IMPLANTS: Chronic | ICD-10-CM

## 2020-03-24 DIAGNOSIS — N20.0 CALCULUS OF KIDNEY: ICD-10-CM

## 2020-03-24 DIAGNOSIS — J12.9 VIRAL PNEUMONIA, UNSPECIFIED: ICD-10-CM

## 2020-03-24 LAB
ALBUMIN SERPL ELPH-MCNC: 4 G/DL — SIGNIFICANT CHANGE UP (ref 3.3–5)
ALP SERPL-CCNC: 59 U/L — SIGNIFICANT CHANGE UP (ref 40–120)
ALT FLD-CCNC: 36 U/L — SIGNIFICANT CHANGE UP (ref 10–45)
ANION GAP SERPL CALC-SCNC: 13 MMOL/L — SIGNIFICANT CHANGE UP (ref 5–17)
ANION GAP SERPL CALC-SCNC: 15 MMOL/L — SIGNIFICANT CHANGE UP (ref 5–17)
AST SERPL-CCNC: 44 U/L — HIGH (ref 10–40)
BASE EXCESS BLDV CALC-SCNC: 7.6 MMOL/L — HIGH (ref -2–2)
BASOPHILS # BLD AUTO: 0 K/UL — SIGNIFICANT CHANGE UP (ref 0–0.2)
BASOPHILS NFR BLD AUTO: 0 % — SIGNIFICANT CHANGE UP (ref 0–2)
BILIRUB SERPL-MCNC: 0.8 MG/DL — SIGNIFICANT CHANGE UP (ref 0.2–1.2)
BUN SERPL-MCNC: 12 MG/DL — SIGNIFICANT CHANGE UP (ref 7–23)
BUN SERPL-MCNC: 9 MG/DL — SIGNIFICANT CHANGE UP (ref 7–23)
CA-I SERPL-SCNC: 1.01 MMOL/L — LOW (ref 1.12–1.3)
CALCIUM SERPL-MCNC: 8.1 MG/DL — LOW (ref 8.4–10.5)
CALCIUM SERPL-MCNC: 8.8 MG/DL — SIGNIFICANT CHANGE UP (ref 8.4–10.5)
CHLORIDE BLDV-SCNC: 90 MMOL/L — LOW (ref 96–108)
CHLORIDE SERPL-SCNC: 87 MMOL/L — LOW (ref 96–108)
CHLORIDE SERPL-SCNC: 97 MMOL/L — SIGNIFICANT CHANGE UP (ref 96–108)
CO2 BLDV-SCNC: 33 MMOL/L — HIGH (ref 22–30)
CO2 SERPL-SCNC: 25 MMOL/L — SIGNIFICANT CHANGE UP (ref 22–31)
CO2 SERPL-SCNC: 29 MMOL/L — SIGNIFICANT CHANGE UP (ref 22–31)
CREAT SERPL-MCNC: 0.86 MG/DL — SIGNIFICANT CHANGE UP (ref 0.5–1.3)
CREAT SERPL-MCNC: 1.05 MG/DL — SIGNIFICANT CHANGE UP (ref 0.5–1.3)
EOSINOPHIL # BLD AUTO: 0 K/UL — SIGNIFICANT CHANGE UP (ref 0–0.5)
EOSINOPHIL NFR BLD AUTO: 0 % — SIGNIFICANT CHANGE UP (ref 0–6)
GAS PNL BLDV: 130 MMOL/L — LOW (ref 135–145)
GAS PNL BLDV: SIGNIFICANT CHANGE UP
GAS PNL BLDV: SIGNIFICANT CHANGE UP
GLUCOSE BLDV-MCNC: 102 MG/DL — HIGH (ref 70–99)
GLUCOSE SERPL-MCNC: 95 MG/DL — SIGNIFICANT CHANGE UP (ref 70–99)
GLUCOSE SERPL-MCNC: 98 MG/DL — SIGNIFICANT CHANGE UP (ref 70–99)
HCO3 BLDV-SCNC: 32 MMOL/L — HIGH (ref 21–29)
HCT VFR BLD CALC: 42.7 % — SIGNIFICANT CHANGE UP (ref 39–50)
HCT VFR BLDA CALC: 47 % — SIGNIFICANT CHANGE UP (ref 39–50)
HGB BLD CALC-MCNC: 15.4 G/DL — SIGNIFICANT CHANGE UP (ref 13–17)
HGB BLD-MCNC: 14.8 G/DL — SIGNIFICANT CHANGE UP (ref 13–17)
LACTATE BLDV-MCNC: 1.6 MMOL/L — SIGNIFICANT CHANGE UP (ref 0.7–2)
LYMPHOCYTES # BLD AUTO: 0.84 K/UL — LOW (ref 1–3.3)
LYMPHOCYTES # BLD AUTO: 18.1 % — SIGNIFICANT CHANGE UP (ref 13–44)
MANUAL SMEAR VERIFICATION: SIGNIFICANT CHANGE UP
MCHC RBC-ENTMCNC: 28.6 PG — SIGNIFICANT CHANGE UP (ref 27–34)
MCHC RBC-ENTMCNC: 34.7 GM/DL — SIGNIFICANT CHANGE UP (ref 32–36)
MCV RBC AUTO: 82.6 FL — SIGNIFICANT CHANGE UP (ref 80–100)
MONOCYTES # BLD AUTO: 0.36 K/UL — SIGNIFICANT CHANGE UP (ref 0–0.9)
MONOCYTES NFR BLD AUTO: 7.8 % — SIGNIFICANT CHANGE UP (ref 2–14)
NEUTROPHILS # BLD AUTO: 3.44 K/UL — SIGNIFICANT CHANGE UP (ref 1.8–7.4)
NEUTROPHILS NFR BLD AUTO: 74.1 % — SIGNIFICANT CHANGE UP (ref 43–77)
PCO2 BLDV: 44 MMHG — SIGNIFICANT CHANGE UP (ref 35–50)
PH BLDV: 7.48 — HIGH (ref 7.35–7.45)
PLAT MORPH BLD: NORMAL — SIGNIFICANT CHANGE UP
PLATELET # BLD AUTO: 149 K/UL — LOW (ref 150–400)
PO2 BLDV: 35 MMHG — SIGNIFICANT CHANGE UP (ref 25–45)
POTASSIUM BLDV-SCNC: 2.6 MMOL/L — CRITICAL LOW (ref 3.5–5.3)
POTASSIUM SERPL-MCNC: 2.7 MMOL/L — CRITICAL LOW (ref 3.5–5.3)
POTASSIUM SERPL-MCNC: 3.3 MMOL/L — LOW (ref 3.5–5.3)
POTASSIUM SERPL-SCNC: 2.7 MMOL/L — CRITICAL LOW (ref 3.5–5.3)
POTASSIUM SERPL-SCNC: 3.3 MMOL/L — LOW (ref 3.5–5.3)
PROT SERPL-MCNC: 7.6 G/DL — SIGNIFICANT CHANGE UP (ref 6–8.3)
RBC # BLD: 5.17 M/UL — SIGNIFICANT CHANGE UP (ref 4.2–5.8)
RBC # FLD: 12.5 % — SIGNIFICANT CHANGE UP (ref 10.3–14.5)
RBC BLD AUTO: NORMAL — SIGNIFICANT CHANGE UP
SAO2 % BLDV: 66 % — LOW (ref 67–88)
SARS-COV-2 RNA SPEC QL NAA+PROBE: DETECTED
SODIUM SERPL-SCNC: 131 MMOL/L — LOW (ref 135–145)
SODIUM SERPL-SCNC: 135 MMOL/L — SIGNIFICANT CHANGE UP (ref 135–145)
WBC # BLD: 4.64 K/UL — SIGNIFICANT CHANGE UP (ref 3.8–10.5)
WBC # FLD AUTO: 4.64 K/UL — SIGNIFICANT CHANGE UP (ref 3.8–10.5)

## 2020-03-24 PROCEDURE — 99223 1ST HOSP IP/OBS HIGH 75: CPT

## 2020-03-24 PROCEDURE — 71045 X-RAY EXAM CHEST 1 VIEW: CPT | Mod: 26

## 2020-03-24 RX ORDER — POTASSIUM CHLORIDE 20 MEQ
40 PACKET (EA) ORAL ONCE
Refills: 0 | Status: COMPLETED | OUTPATIENT
Start: 2020-03-24 | End: 2020-03-24

## 2020-03-24 RX ORDER — FAMOTIDINE 10 MG/ML
20 INJECTION INTRAVENOUS
Refills: 0 | Status: DISCONTINUED | OUTPATIENT
Start: 2020-03-24 | End: 2020-03-31

## 2020-03-24 RX ORDER — HYDROXYCHLOROQUINE SULFATE 200 MG
400 TABLET ORAL EVERY 12 HOURS
Refills: 0 | Status: COMPLETED | OUTPATIENT
Start: 2020-03-24 | End: 2020-03-25

## 2020-03-24 RX ORDER — ACETAMINOPHEN 500 MG
650 TABLET ORAL ONCE
Refills: 0 | Status: COMPLETED | OUTPATIENT
Start: 2020-03-24 | End: 2020-03-24

## 2020-03-24 RX ORDER — ACETAMINOPHEN 500 MG
650 TABLET ORAL EVERY 6 HOURS
Refills: 0 | Status: DISCONTINUED | OUTPATIENT
Start: 2020-03-24 | End: 2020-03-25

## 2020-03-24 RX ORDER — ACETAMINOPHEN 500 MG
1000 TABLET ORAL ONCE
Refills: 0 | Status: COMPLETED | OUTPATIENT
Start: 2020-03-24 | End: 2020-03-24

## 2020-03-24 RX ORDER — GABAPENTIN 400 MG/1
300 CAPSULE ORAL THREE TIMES A DAY
Refills: 0 | Status: DISCONTINUED | OUTPATIENT
Start: 2020-03-24 | End: 2020-03-31

## 2020-03-24 RX ORDER — POTASSIUM CITRATE MONOHYDRATE 100 %
10 POWDER (GRAM) MISCELLANEOUS THREE TIMES A DAY
Refills: 0 | Status: DISCONTINUED | OUTPATIENT
Start: 2020-03-24 | End: 2020-03-25

## 2020-03-24 RX ORDER — ONDANSETRON 8 MG/1
4 TABLET, FILM COATED ORAL EVERY 8 HOURS
Refills: 0 | Status: DISCONTINUED | OUTPATIENT
Start: 2020-03-24 | End: 2020-03-26

## 2020-03-24 RX ORDER — SODIUM CHLORIDE 9 MG/ML
1000 INJECTION INTRAMUSCULAR; INTRAVENOUS; SUBCUTANEOUS ONCE
Refills: 0 | Status: COMPLETED | OUTPATIENT
Start: 2020-03-24 | End: 2020-03-24

## 2020-03-24 RX ORDER — POTASSIUM CHLORIDE 20 MEQ
10 PACKET (EA) ORAL ONCE
Refills: 0 | Status: COMPLETED | OUTPATIENT
Start: 2020-03-24 | End: 2020-03-24

## 2020-03-24 RX ORDER — HYDROXYCHLOROQUINE SULFATE 200 MG
TABLET ORAL
Refills: 0 | Status: COMPLETED | OUTPATIENT
Start: 2020-03-24 | End: 2020-03-29

## 2020-03-24 RX ORDER — MAGNESIUM SULFATE 500 MG/ML
1 VIAL (ML) INJECTION ONCE
Refills: 0 | Status: COMPLETED | OUTPATIENT
Start: 2020-03-24 | End: 2020-03-24

## 2020-03-24 RX ORDER — HYDROXYCHLOROQUINE SULFATE 200 MG
200 TABLET ORAL EVERY 12 HOURS
Refills: 0 | Status: COMPLETED | OUTPATIENT
Start: 2020-03-25 | End: 2020-03-29

## 2020-03-24 RX ADMIN — ONDANSETRON 4 MILLIGRAM(S): 8 TABLET, FILM COATED ORAL at 17:24

## 2020-03-24 RX ADMIN — Medication 40 MILLIEQUIVALENT(S): at 02:59

## 2020-03-24 RX ADMIN — Medication 400 MILLIGRAM(S): at 21:51

## 2020-03-24 RX ADMIN — Medication 100 MILLIEQUIVALENT(S): at 04:02

## 2020-03-24 RX ADMIN — Medication 650 MILLIGRAM(S): at 16:33

## 2020-03-24 RX ADMIN — SODIUM CHLORIDE 1000 MILLILITER(S): 9 INJECTION INTRAMUSCULAR; INTRAVENOUS; SUBCUTANEOUS at 03:00

## 2020-03-24 RX ADMIN — Medication 400 MILLIGRAM(S): at 17:21

## 2020-03-24 RX ADMIN — FAMOTIDINE 20 MILLIGRAM(S): 10 INJECTION INTRAVENOUS at 17:21

## 2020-03-24 RX ADMIN — Medication 1000 MILLIGRAM(S): at 23:05

## 2020-03-24 RX ADMIN — Medication 100 GRAM(S): at 02:59

## 2020-03-24 RX ADMIN — Medication 650 MILLIGRAM(S): at 01:39

## 2020-03-24 RX ADMIN — Medication 650 MILLIGRAM(S): at 06:38

## 2020-03-24 RX ADMIN — SODIUM CHLORIDE 1000 MILLILITER(S): 9 INJECTION INTRAMUSCULAR; INTRAVENOUS; SUBCUTANEOUS at 01:39

## 2020-03-24 RX ADMIN — Medication 10 MILLIEQUIVALENT(S): at 16:05

## 2020-03-24 NOTE — ED ADULT NURSE NOTE - CAS EDP DISCH DISPOSITION ADMI
[facility]/[address] [place] [address]

## 2020-03-24 NOTE — ED PROVIDER NOTE - PHYSICAL EXAMINATION
VITALS: reviewed  GEN: NAD, A & O x 4  HEAD/EYES: NCAT, EOMI, anicteric sclerae  ENT: mucus membranes moist, oropharynx WNL, trachea midline  RESP: lungs CTA with equal breath sounds bilaterally, tachypneic, saturating 96% on NC, chest wall nontender and atraumatic  CV: heart with reg rhythm S1, S2, distal pulses intact and symmetric bilaterally  ABDOMEN: normoactive bowel sounds, soft, nondistended, nontender, no palpable masses  : no CVAT  MSK: extremities atraumatic and nontender, no edema, no asymmetry.  SKIN: warm, dry, no rash, no bruising, no cyanosis. color appropriate for ethnicity  NEURO: alert, mentating appropriately, no facial asymmetry.   PSYCH: Affect appropriate

## 2020-03-24 NOTE — H&P ADULT - ASSESSMENT
43 yo male with PMHx Nephrolithiases and chronic lower back pain w/ sciatica presented to [facility] c/o fever, cough and worsening SOB, + COVID admitted for viral PNA.

## 2020-03-24 NOTE — ED ADULT NURSE NOTE - OBJECTIVE STATEMENT
44M aaox4 ambulatory BIBEMS from home with c/o SOB, chills and fever, non productive cough for more than a week now, no recent travel or sick contacts, also c/o  nausea and vomit; unable to eat, body aches; Covid tested but no results yet; brought in by EMS due to low o2 sat at home of 90% and sob; oxygen placed by EMS. O2 sat at 90 on room air, continued on O2 at 4LNC. No wheezing noted. Labs and meds given as ordered. Placed on respiratory precaution r/o COVID 19. will continue to monitor.

## 2020-03-24 NOTE — H&P ADULT - NSHPREVIEWOFSYSTEMS_GEN_ALL_CORE
REVIEW OF SYSTEMS:    CONSTITUTIONAL: No weakness, fevers or chills  EYES: no blurry vision or eye pain.   ENT: No throat pain. No dysphagia.    NECK: No pain or stiffness  RESPIRATORY: + cough, No wheezing, hemoptysis; +shortness of breath  CARDIOVASCULAR: +chest pain, No palpitations.  GASTROINTESTINAL: No abdominal pain. No nausea or vomiting; No diarrhea or constipation. No melena or hematochezia.  GENITOURINARY: No dysuria, frequency or hematuria  NEUROLOGICAL: No numbness, + weakness. No dizziness or falls.   SKIN: No itching, burning, rashes, or lesions.   LYMPHATIC: No masses or swelling.   All other review of systems is negative unless indicated above.

## 2020-03-24 NOTE — ED ADULT NURSE REASSESSMENT NOTE - NS ED NURSE REASSESS COMMENT FT1
Report received from DIAZ Sousa in red. Pt observed sitting up in stretcher maintained on 4L NC and continuous pulse ox and cardiac monitor. Pt assigned bed on 7Tower.

## 2020-03-24 NOTE — ED PROVIDER NOTE - CLINICAL SUMMARY MEDICAL DECISION MAKING FREE TEXT BOX
45yo M presenting with sob and fever. Desat to 90's off of NC. XR with b/l patchy opacities suspicious for covid. tba

## 2020-03-24 NOTE — H&P ADULT - HISTORY OF PRESENT ILLNESS
45 yo male with PMHx Nephrolithiases and chronic lower back pain w/ sciatica presented to [facility] c/o fever, cough and worsening SOB. Patient states his symptoms started 8 days ago with dry cough and fever. He also states he has been feeling nausea, had 5 episodes of emesis (NBNB) and diarreha for the last 5 days. His last BM was 2 days ago. States he has been feeling fatigued for the last few days. He denies any sick contacts, COVID+ contacts, recent travel, sore throat and dizziness. States he started developing SOB worse in ambulation which promoted him to come to the ED. Patient states he has barely been able to eat, however today for the first time he feels hungry since his symptoms started.     In ED pt found to have + COVID19, CXR: Scattered patchy opacities, likely representing multifocal infection, received 2L NS bolus, 50 meq KCl and Mgsulfate, 43 yo male with PMHx Nephrolithiases and chronic lower back pain w/ sciatica presented to [facility] c/o fever, cough and worsening SOB. Patient states his symptoms started 8 days ago with dry cough and fever. He also states he has been feeling nausea, had 5 episodes of emesis (NBNB) and diarreha for the last 5 days. His last BM was 2 days ago. States he has been feeling fatigued for the last few days. He denies any sick contacts, COVID+ contacts, recent travel, sore throat and dizziness. States he started developing SOB worse in ambulation which promoted him to come to the ED. Patient states he has barely been able to eat, however today for the first time he feels hungry since his symptoms started. He complains of sharp right sided CP, 5/10, worse with deep breath, non radiating     In ED pt found to have + COVID19, CXR: Scattered patchy opacities, likely representing multifocal infection, received 2L NS bolus, 50 meq KCl and Mgsulfate,

## 2020-03-24 NOTE — ED ADULT NURSE REASSESSMENT NOTE - NS ED NURSE REASSESS COMMENT FT1
NS bolus and magnesium infusing. pt tolerated PO potassium well and is eating crackers. Pt titrated down to 1 L NC

## 2020-03-24 NOTE — ED PROVIDER NOTE - ATTENDING CONTRIBUTION TO CARE
MD Hines:  patient seen and evaluated personally.   I agree with the History & Physical,  Impression & Plan other than what was detailed in my note.  MD Hines    45 y/o m no sig pmh, prestns to ed w/ cc of cough, sob ongoing x 9 days but has recently gotten worse, pt also having nausea and vomiting , he was reported to have sats in 90s, pt also had low bp in triage however repeat at bs is in 120's, pt is ill appearing but non toxic, lungs cta b/l, pt has no known covid pos contacts, may need admission will send swab. cxr, ekg, cbc, cmp

## 2020-03-24 NOTE — H&P ADULT - PROBLEM SELECTOR PLAN 4
Transitions of Care Status:  1.  Name of PCP:  2.  PCP Contacted on Admission: [ ] Y    [ x] N    3.  PCP contacted at Discharge: [ ] Y    [ ] N    [ ] N/A  4.  Post-Discharge Appointment Date and Location:  5.  Summary of Handoff given to PCP: hold off on NSAIDs  c/w gabapentin PRN

## 2020-03-24 NOTE — H&P ADULT - NSHPLABSRESULTS_GEN_ALL_CORE
Labs personally reviewed:                          14.8   4.64  )-----------( 149      ( 24 Mar 2020 01:51 )             42.7       03-24    131<L>  |  87<L>  |  12  ----------------------------<  98  2.7<LL>   |  29  |  1.05    Ca    8.8      24 Mar 2020 01:51    TPro  7.6  /  Alb  4.0  /  TBili  0.8  /  DBili  x   /  AST  44<H>  /  ALT  36  /  AlkPhos  59  03-24            Imaging personally reviewed:  CXR:  Scattered patchy opacities, likely representing multifocal infection (differential considerations include viral pneumonia, such as COVID 19).    EKG personally reviewed:   QTC: 454

## 2020-03-24 NOTE — CONSULT NOTE ADULT - SUBJECTIVE AND OBJECTIVE BOX
Pulmonary Consult Note    CAROLANN RAMIRES  MRN-[id number]    Chief Complaint: Patient is a 44y old  Male who presents with a chief complaint of     HPI:  44yMale   -"flu like illness" x 10 days, worsening over last few days  -Feeling short of breath  -no cough or sputum  -body aches/headache  -COVID positive    ROS:  -poor appetite, +nausea  All other systems reviewed and negative    PAST MEDICAL HISTORY: HEALTH ISSUES - PROBLEM Dx:          SOCIAL HISTORY: former smoker, quit 2015    ACTIVE MEDICATION LIST:  MEDICATIONS  (STANDING):    MEDICATIONS  (PRN):      EXAM:  Vital Signs Last 24 Hrs  T(C): 37.1 (24 Mar 2020 07:15), Max: 38.9 (23 Mar 2020 23:21)  T(F): 98.8 (24 Mar 2020 07:15), Max: 102 (23 Mar 2020 23:21)  HR: 85 (24 Mar 2020 07:37) (70 - 94)  BP: 106/55 (24 Mar 2020 07:37) (90/60 - 125/79)  BP(mean): --  RR: 20 (24 Mar 2020 07:37) (18 - 20)  SpO2: 99% (24 Mar 2020 07:37) (94% - 99%)  GENERAL: No acute distress  NEURO: Alert and oriented x 3  LUNGS: Clear to auscultation bilaterally, no rales or wheezes  CV: S1/S2, no murmur  ABDOMEN: +BS, nontender  EXTREMITIES: no clubbing, cyanosis, edema    LABS/IMAGING: reviewed                        14.8   4.64  )-----------( 149      ( 24 Mar 2020 01:51 )             42.7   03-24    131<L>  |  87<L>  |  12  ----------------------------<  98  2.7<LL>   |  29  |  1.05    Ca    8.8      24 Mar 2020 01:51    TPro  7.6  /  Alb  4.0  /  TBili  0.8  /  DBili  x   /  AST  44<H>  /  ALT  36  /  AlkPhos  59  03-24    < from: Xray Chest 1 View AP/PA (03.24.20 @ 01:58) >    IMPRESSION: Scattered patchy opacities, likely representing multifocal infection (differential considerations include viral pneumonia, such as COVID 19).      < end of copied text >  < from: Xray Chest 1 View AP/PA (03.24.20 @ 01:58) >    IMPRESSION: Scattered patchy opacities, likely representing multifocal infection (differential considerations include viral pneumonia, such as COVID 19).      < end of copied text >      PROBLEM LIST:  44yMale with HEALTH ISSUES - PROBLEM Dx:  COVID positive        RECS:  -Suggest initiating hydroxychloroquine 400mg BID x 12 hrs, followed by 400mg daily x 5-10 days now  -Monitor O2 sat  -supportive care  -IV hydration '  -K, mg supplementation  -repeat labs  -cxr in AM    Thank you for this consultation, please feel free to call with any questions [phone]  Elvira Neil MD Pulmonary Consult Note    CAROLANN RAMIRES  MRN-[id number]    Chief Complaint: Patient is a 44y old  Male who presents with a chief complaint of     HPI:  44yMale   -"flu like illness" x 10 days, worsening over last few days  -Feeling short of breath  -no cough or sputum  -body aches/headache  -COVID positive    ROS:  -poor appetite, +nausea  All other systems reviewed and negative    PAST MEDICAL HISTORY: HEALTH ISSUES - PROBLEM Dx:          SOCIAL HISTORY: former smoker, quit 2015    ACTIVE MEDICATION LIST:  MEDICATIONS  (STANDING):    MEDICATIONS  (PRN):      EXAM:  **O2 sat 91% on room air, improves to 95% on 4.5L** checked myself.    Vital Signs Last 24 Hrs  T(C): 37.1 (24 Mar 2020 07:15), Max: 38.9 (23 Mar 2020 23:21)  T(F): 98.8 (24 Mar 2020 07:15), Max: 102 (23 Mar 2020 23:21)  HR: 85 (24 Mar 2020 07:37) (70 - 94)  BP: 106/55 (24 Mar 2020 07:37) (90/60 - 125/79)  BP(mean): --  RR: 20 (24 Mar 2020 07:37) (18 - 20)  SpO2: 99% (24 Mar 2020 07:37) (94% - 99%)  GENERAL: No acute distress  NEURO: Alert and oriented x 3  LUNGS: Clear to auscultation bilaterally, no rales or wheezes  CV: S1/S2, no murmur  ABDOMEN: +BS, nontender  EXTREMITIES: no clubbing, cyanosis, edema    LABS/IMAGING: reviewed                        14.8   4.64  )-----------( 149      ( 24 Mar 2020 01:51 )             42.7   03-24    131<L>  |  87<L>  |  12  ----------------------------<  98  2.7<LL>   |  29  |  1.05    Ca    8.8      24 Mar 2020 01:51    TPro  7.6  /  Alb  4.0  /  TBili  0.8  /  DBili  x   /  AST  44<H>  /  ALT  36  /  AlkPhos  59  03-24    < from: Xray Chest 1 View AP/PA (03.24.20 @ 01:58) >    IMPRESSION: Scattered patchy opacities, likely representing multifocal infection (differential considerations include viral pneumonia, such as COVID 19).      < end of copied text >  < from: Xray Chest 1 View AP/PA (03.24.20 @ 01:58) >    IMPRESSION: Scattered patchy opacities, likely representing multifocal infection (differential considerations include viral pneumonia, such as COVID 19).      < end of copied text >      PROBLEM LIST:  44yMale with HEALTH ISSUES - PROBLEM Dx:  COVID positive        RECS:  -Suggest initiating hydroxychloroquine 400mg BID x 12 hrs, followed by 400mg daily x 5-10 days now  -Monitor O2 sat  -supportive care  -IV hydration '  -K, mg supplementation  -repeat labs  -cxr in AM    Thank you for this consultation, please feel free to call with any questions [phone]  Elvira Neil MD Pulmonary Consult Note    CAROLANN RAMIRES  MRN-[id number]    Chief Complaint: Patient is a 44y old  Male who presents with a chief complaint of     HPI:  44yMale   -"flu like illness" x 10 days, worsening over last few days  -Feeling short of breath  -no cough or sputum  -body aches/headache  -COVID positive    ROS:  -poor appetite, +nausea  All other systems reviewed and negative    PAST MEDICAL HISTORY: HEALTH ISSUES - PROBLEM Dx:          SOCIAL HISTORY: former smoker, quit 2015    ACTIVE MEDICATION LIST:  MEDICATIONS  (STANDING):    MEDICATIONS  (PRN):      EXAM:  **O2 sat 91% on room air, improves to 95% on 4.5L** checked myself.    Vital Signs Last 24 Hrs  T(C): 37.1 (24 Mar 2020 07:15), Max: 38.9 (23 Mar 2020 23:21)  T(F): 98.8 (24 Mar 2020 07:15), Max: 102 (23 Mar 2020 23:21)  HR: 85 (24 Mar 2020 07:37) (70 - 94)  BP: 106/55 (24 Mar 2020 07:37) (90/60 - 125/79)  BP(mean): --  RR: 20 (24 Mar 2020 07:37) (18 - 20)  SpO2: 99% (24 Mar 2020 07:37) (94% - 99%)  GENERAL: No acute distress  NEURO: Alert and oriented x 3  LUNGS: Clear to auscultation bilaterally, no rales or wheezes  CV: S1/S2, no murmur  ABDOMEN: +BS, nontender  EXTREMITIES: no clubbing, cyanosis, edema    LABS/IMAGING: reviewed                        14.8   4.64  )-----------( 149      ( 24 Mar 2020 01:51 )             42.7   03-24    131<L>  |  87<L>  |  12  ----------------------------<  98  2.7<LL>   |  29  |  1.05    Ca    8.8      24 Mar 2020 01:51    TPro  7.6  /  Alb  4.0  /  TBili  0.8  /  DBili  x   /  AST  44<H>  /  ALT  36  /  AlkPhos  59  03-24    < from: Xray Chest 1 View AP/PA (03.24.20 @ 01:58) >    IMPRESSION: Scattered patchy opacities, likely representing multifocal infection (differential considerations include viral pneumonia, such as COVID 19).      < end of copied text >  < from: Xray Chest 1 View AP/PA (03.24.20 @ 01:58) >    IMPRESSION: Scattered patchy opacities, likely representing multifocal infection (differential considerations include viral pneumonia, such as COVID 19).      < end of copied text >      PROBLEM LIST:  44yMale with HEALTH ISSUES - PROBLEM Dx:  COVID positive        RECS:  -Suggest initiating hydroxychloroquine protocol (ordered)  -Monitor O2 sat  -supportive care  -IV hydration '  -K, mg supplementation  -repeat labs  -cxr in AM    Thank you for this consultation, please feel free to call with any questions [phone]  Elvira Neil MD Pulmonary Consult Note    CAROLANN RAMIRES  MRN-[id number]    Chief Complaint: Patient is a 44y old  Male who presents with a chief complaint of     HPI:  44yMale   -"flu like illness" x 10 days, worsening over last few days  -Feeling short of breath  -no cough or sputum  -body aches/headache  -COVID positive    ROS:  -poor appetite, +nausea  All other systems reviewed and negative    PAST MEDICAL HISTORY: HEALTH ISSUES - PROBLEM Dx:          SOCIAL HISTORY: former smoker, quit 2015    ACTIVE MEDICATION LIST:  MEDICATIONS  (STANDING):    MEDICATIONS  (PRN):      EXAM:  **O2 sat 91% on room air, improves to 95% on 4.5L** checked myself.    Vital Signs Last 24 Hrs  T(C): 37.1 (24 Mar 2020 07:15), Max: 38.9 (23 Mar 2020 23:21)  T(F): 98.8 (24 Mar 2020 07:15), Max: 102 (23 Mar 2020 23:21)  HR: 85 (24 Mar 2020 07:37) (70 - 94)  BP: 106/55 (24 Mar 2020 07:37) (90/60 - 125/79)  BP(mean): --  RR: 20 (24 Mar 2020 07:37) (18 - 20)  SpO2: 99% (24 Mar 2020 07:37) (94% - 99%)  GENERAL: No acute distress  NEURO: Alert and oriented x 3  LUNGS: Clear to auscultation bilaterally, no rales or wheezes  CV: S1/S2, no murmur  ABDOMEN: +BS, nontender  EXTREMITIES: no clubbing, cyanosis, edema    LABS/IMAGING: reviewed                        14.8   4.64  )-----------( 149      ( 24 Mar 2020 01:51 )             42.7   03-24    131<L>  |  87<L>  |  12  ----------------------------<  98  2.7<LL>   |  29  |  1.05    Ca    8.8      24 Mar 2020 01:51    TPro  7.6  /  Alb  4.0  /  TBili  0.8  /  DBili  x   /  AST  44<H>  /  ALT  36  /  AlkPhos  59  03-24    < from: Xray Chest 1 View AP/PA (03.24.20 @ 01:58) >    IMPRESSION: Scattered patchy opacities, likely representing multifocal infection (differential considerations include viral pneumonia, such as COVID 19).      < end of copied text >  < from: Xray Chest 1 View AP/PA (03.24.20 @ 01:58) >    IMPRESSION: Scattered patchy opacities, likely representing multifocal infection (differential considerations include viral pneumonia, such as COVID 19).      < end of copied text >      PROBLEM LIST:  44yMale with HEALTH ISSUES - PROBLEM Dx:  COVID positive        RECS:  -Suggest initiating hydroxychloroquine protocol (ordered), monitor EKG, qtc  -Monitor O2 sat  -supportive care  -IV hydration '  -K, mg supplementation  -repeat labs  -cxr in AM    Thank you for this consultation, please feel free to call with any questions [phone]  Elvira Neil MD

## 2020-03-24 NOTE — ED PROVIDER NOTE - OBJECTIVE STATEMENT
45 yo M hx kidney stones presenting with 8 days of fever, cough, nausea, vomiting, diarrhea and weakness with acute worsening shortness of breath. Patient states he has barely been able to eat as he has nb/nb emesis with intake.

## 2020-03-24 NOTE — H&P ADULT - PROBLEM SELECTOR PLAN 1
+ COVID 19 Fever and cough at home, found to be COVID+, CXR patchy opacities, Currently satting well 3L, not tachypneic.   - c/w Plaquenil Qtc wnl  - monitor off antibiotics  - airborne/contact precautions  - check CRP, LDH, ferritin  - avoid IVFs and NSAIDs given COVID19+  - encourage PO hydration, no longer having diarrhea

## 2020-03-25 DIAGNOSIS — J96.01 ACUTE RESPIRATORY FAILURE WITH HYPOXIA: ICD-10-CM

## 2020-03-25 DIAGNOSIS — B34.2 CORONAVIRUS INFECTION, UNSPECIFIED: ICD-10-CM

## 2020-03-25 LAB
ALBUMIN SERPL ELPH-MCNC: 3.3 G/DL — SIGNIFICANT CHANGE UP (ref 3.3–5)
ALP SERPL-CCNC: 52 U/L — SIGNIFICANT CHANGE UP (ref 40–120)
ALT FLD-CCNC: 29 U/L — SIGNIFICANT CHANGE UP (ref 10–45)
ANION GAP SERPL CALC-SCNC: 15 MMOL/L — SIGNIFICANT CHANGE UP (ref 5–17)
AST SERPL-CCNC: 34 U/L — SIGNIFICANT CHANGE UP (ref 10–40)
BILIRUB SERPL-MCNC: 0.7 MG/DL — SIGNIFICANT CHANGE UP (ref 0.2–1.2)
BUN SERPL-MCNC: 8 MG/DL — SIGNIFICANT CHANGE UP (ref 7–23)
CALCIUM SERPL-MCNC: 8.1 MG/DL — LOW (ref 8.4–10.5)
CHLORIDE SERPL-SCNC: 94 MMOL/L — LOW (ref 96–108)
CO2 SERPL-SCNC: 25 MMOL/L — SIGNIFICANT CHANGE UP (ref 22–31)
CREAT SERPL-MCNC: 0.85 MG/DL — SIGNIFICANT CHANGE UP (ref 0.5–1.3)
CRP SERPL-MCNC: 10.79 MG/DL — HIGH (ref 0–0.4)
D DIMER BLD IA.RAPID-MCNC: 387 NG/ML DDU — HIGH
FERRITIN SERPL-MCNC: 1240 NG/ML — HIGH (ref 30–400)
GLUCOSE SERPL-MCNC: 91 MG/DL — SIGNIFICANT CHANGE UP (ref 70–99)
HCT VFR BLD CALC: 38.7 % — LOW (ref 39–50)
HGB BLD-MCNC: 13.1 G/DL — SIGNIFICANT CHANGE UP (ref 13–17)
LDH SERPL L TO P-CCNC: 336 U/L — HIGH (ref 50–242)
MAGNESIUM SERPL-MCNC: 2.3 MG/DL — SIGNIFICANT CHANGE UP (ref 1.6–2.6)
MCHC RBC-ENTMCNC: 28.5 PG — SIGNIFICANT CHANGE UP (ref 27–34)
MCHC RBC-ENTMCNC: 33.9 GM/DL — SIGNIFICANT CHANGE UP (ref 32–36)
MCV RBC AUTO: 84.3 FL — SIGNIFICANT CHANGE UP (ref 80–100)
NRBC # BLD: 0 /100 WBCS — SIGNIFICANT CHANGE UP (ref 0–0)
PHOSPHATE SERPL-MCNC: 2.3 MG/DL — LOW (ref 2.5–4.5)
PLATELET # BLD AUTO: 150 K/UL — SIGNIFICANT CHANGE UP (ref 150–400)
POTASSIUM SERPL-MCNC: 3.1 MMOL/L — LOW (ref 3.5–5.3)
POTASSIUM SERPL-MCNC: 3.2 MMOL/L — LOW (ref 3.5–5.3)
POTASSIUM SERPL-SCNC: 3.1 MMOL/L — LOW (ref 3.5–5.3)
POTASSIUM SERPL-SCNC: 3.2 MMOL/L — LOW (ref 3.5–5.3)
PROT SERPL-MCNC: 6.4 G/DL — SIGNIFICANT CHANGE UP (ref 6–8.3)
RBC # BLD: 4.59 M/UL — SIGNIFICANT CHANGE UP (ref 4.2–5.8)
RBC # FLD: 12.8 % — SIGNIFICANT CHANGE UP (ref 10.3–14.5)
SODIUM SERPL-SCNC: 134 MMOL/L — LOW (ref 135–145)
WBC # BLD: 4.91 K/UL — SIGNIFICANT CHANGE UP (ref 3.8–10.5)
WBC # FLD AUTO: 4.91 K/UL — SIGNIFICANT CHANGE UP (ref 3.8–10.5)

## 2020-03-25 PROCEDURE — 99233 SBSQ HOSP IP/OBS HIGH 50: CPT

## 2020-03-25 PROCEDURE — 99223 1ST HOSP IP/OBS HIGH 75: CPT

## 2020-03-25 PROCEDURE — 93010 ELECTROCARDIOGRAM REPORT: CPT

## 2020-03-25 RX ORDER — POTASSIUM CHLORIDE 20 MEQ
40 PACKET (EA) ORAL ONCE
Refills: 0 | Status: COMPLETED | OUTPATIENT
Start: 2020-03-25 | End: 2020-03-25

## 2020-03-25 RX ORDER — ACETAMINOPHEN 500 MG
650 TABLET ORAL EVERY 6 HOURS
Refills: 0 | Status: DISCONTINUED | OUTPATIENT
Start: 2020-03-25 | End: 2020-03-31

## 2020-03-25 RX ORDER — POTASSIUM CHLORIDE 20 MEQ
20 PACKET (EA) ORAL
Refills: 0 | Status: COMPLETED | OUTPATIENT
Start: 2020-03-25 | End: 2020-03-25

## 2020-03-25 RX ORDER — POTASSIUM CHLORIDE 20 MEQ
40 PACKET (EA) ORAL ONCE
Refills: 0 | Status: COMPLETED | OUTPATIENT
Start: 2020-03-25 | End: 2020-03-26

## 2020-03-25 RX ORDER — ACETAMINOPHEN 500 MG
1000 TABLET ORAL ONCE
Refills: 0 | Status: COMPLETED | OUTPATIENT
Start: 2020-03-25 | End: 2020-03-25

## 2020-03-25 RX ORDER — ACETAMINOPHEN 500 MG
650 TABLET ORAL ONCE
Refills: 0 | Status: COMPLETED | OUTPATIENT
Start: 2020-03-25 | End: 2020-03-25

## 2020-03-25 RX ORDER — AZITHROMYCIN 500 MG/1
250 TABLET, FILM COATED ORAL DAILY
Refills: 0 | Status: DISCONTINUED | OUTPATIENT
Start: 2020-03-25 | End: 2020-03-29

## 2020-03-25 RX ORDER — POTASSIUM CHLORIDE 20 MEQ
40 PACKET (EA) ORAL EVERY 4 HOURS
Refills: 0 | Status: DISCONTINUED | OUTPATIENT
Start: 2020-03-25 | End: 2020-03-25

## 2020-03-25 RX ADMIN — AZITHROMYCIN 250 MILLIGRAM(S): 500 TABLET, FILM COATED ORAL at 12:11

## 2020-03-25 RX ADMIN — FAMOTIDINE 20 MILLIGRAM(S): 10 INJECTION INTRAVENOUS at 17:30

## 2020-03-25 RX ADMIN — Medication 400 MILLIGRAM(S): at 05:34

## 2020-03-25 RX ADMIN — Medication 200 MILLIGRAM(S): at 17:30

## 2020-03-25 RX ADMIN — Medication 10 MILLIEQUIVALENT(S): at 05:38

## 2020-03-25 RX ADMIN — Medication 40 MILLIEQUIVALENT(S): at 08:54

## 2020-03-25 RX ADMIN — Medication 260 MILLIGRAM(S): at 17:29

## 2020-03-25 RX ADMIN — Medication 650 MILLIGRAM(S): at 03:30

## 2020-03-25 RX ADMIN — Medication 400 MILLIGRAM(S): at 08:36

## 2020-03-25 RX ADMIN — ONDANSETRON 4 MILLIGRAM(S): 8 TABLET, FILM COATED ORAL at 17:30

## 2020-03-25 RX ADMIN — Medication 650 MILLIGRAM(S): at 02:43

## 2020-03-25 RX ADMIN — ONDANSETRON 4 MILLIGRAM(S): 8 TABLET, FILM COATED ORAL at 05:33

## 2020-03-25 RX ADMIN — FAMOTIDINE 20 MILLIGRAM(S): 10 INJECTION INTRAVENOUS at 05:33

## 2020-03-25 NOTE — PROGRESS NOTE ADULT - ASSESSMENT
45 yo male with PMHx Nephrolithiases and chronic lower back pain w/ sciatica presented to [facility] c/o fever, cough and worsening SOB, + COVID admitted for viral PNA.

## 2020-03-25 NOTE — CONSULT NOTE ADULT - SUBJECTIVE AND OBJECTIVE BOX
Patient is a 44y old  Male who presents with a chief complaint of hypoxia COVID19+ (24 Mar 2020 12:32)    HPI:  45 yo male with PMHx Nephrolithiases and chronic lower back pain w/ sciatica presented to [facility] c/o fever, cough and worsening SOB. Patient states his symptoms started 8 days ago with dry cough and fever. He also states he has been feeling nausea, had 5 episodes of emesis (NBNB) and diarreha for the last 5 days. His last BM was 2 days ago. States he has been feeling fatigued for the last few days. He denies any sick contacts, COVID+ contacts, recent travel, sore throat and dizziness. States he started developing SOB worse in ambulation which promoted him to come to the ED. Patient states he has barely been able to eat, however today for the first time he feels hungry since his symptoms started. He complains of sharp right sided CP, 5/10, worse with deep breath, non radiating     In ED pt found to have + COVID19, CXR: Scattered patchy opacities, likely representing multifocal infection, received 2L NS bolus, 50 meq KCl and Mgsulfate, (24 Mar 2020 12:32)      PAST MEDICAL & SURGICAL HISTORY:  Kidney stones  Fixation hardware in leg      Social history:    F     Intolerances        Antimicrobials:    hydroxychloroquine   Oral   hydroxychloroquine 200 milliGRAM(s) Oral every 12 hours        Vital Signs Last 24 Hrs  T(C): 36.7 (25 Mar 2020 09:28), Max: 39.2 (24 Mar 2020 16:10)  T(F): 98.1 (25 Mar 2020 09:28), Max: 102.5 (24 Mar 2020 16:10)  HR: 73 (25 Mar 2020 08:22) (73 - 92)  BP: 108/73 (25 Mar 2020 08:22) (100/68 - 120/71)  BP(mean): 72 (24 Mar 2020 12:32) (72 - 72)  RR: 20 (25 Mar 2020 08:22) (20 - 20)  SpO2: 93% (25 Mar 2020 08:22) (93% - 99%)          t  No cachexia     Eyes:PERRL EOMI.NO discharge or conjunctival injection    ENMT:No sinus tenderness.No thrush.No pharyngeal exudate or erythema.Fair dental hygiene    Neck:Supple,No LN,no JVD           Cardiovascular:S1 S2 wnl, No murmurs,rub or gallops    Gastrointestinal:Soft BS(+) no tenderness no masses ,No rebound or guarding    Genitourinary:No CVA tendereness     Rectal:    Extremities:No cyanosis,clubbing or edema.    Vascular:peripheral pulses felt    Neurological:AAO X 3,No grossly focal deficits    Skin:No rash     Lymph Nodes:No palpable LNs    Musculoskeletal:No joint swelling or LOM    Psychiatric:Affect normal.                                13.1   4.91  )-----------( 150      ( 25 Mar 2020 07:07 )             38.7         03-25    134<L>  |  94<L>  |  8   ----------------------------<  91  3.1<L>   |  25  |  0.85    Ca    8.1<L>      25 Mar 2020 07:04    TPro  6.4  /  Alb  3.3  /  TBili  0.7  /  DBili  x   /  AST  34  /  ALT  29  /  AlkPhos  52  03-25      RECENT CULTURES:  03-24 @ 04:07  .Blood Blood-Peripheral  --  --  --    No growth to date.  --      MICROBIOLOGY:  Culture Results:   No growth to date. (03-24 @ 04:07)  Culture Results:   No growth to date. (03-24 @ 04:07)          Radiology:      Assessment:        Recommendations and Plan:    Pager [phone]  After 5 pm/weekends or if no response :[phone]

## 2020-03-25 NOTE — PROGRESS NOTE ADULT - SUBJECTIVE AND OBJECTIVE BOX
Pulmonary Consult Note    CAROLANN RAMIRES  MRN-[id number]    Chief Complaint: Patient is a 44y old  Male who presents with a chief complaint of     HPI:  tired, sat 96% on 4L  doing "ok"      ROS:  -poor appetite, + headache  All other systems reviewed and negative      SOCIAL HISTORY: former smoker, quit 2015    MEDICATIONS  (STANDING):  azithromycin   Tablet 250 milliGRAM(s) Oral daily  famotidine    Tablet 20 milliGRAM(s) Oral two times a day  hydroxychloroquine   Oral   hydroxychloroquine 200 milliGRAM(s) Oral every 12 hours  potassium citrate 10 milliEquivalent(s) Oral three times a day    MEDICATIONS  (PRN):  acetaminophen   Tablet .. 650 milliGRAM(s) Oral every 6 hours PRN Temp greater or equal to 38C (100.4F), Mild Pain (1 - 3), Moderate Pain (4 - 6)  gabapentin 300 milliGRAM(s) Oral three times a day PRN pain  ondansetron    Tablet 4 milliGRAM(s) Oral every 8 hours PRN Nausea and/or Vomiting        EXAM:  Vital Signs Last 24 Hrs  T(C): 36.7 (25 Mar 2020 09:28), Max: 39.2 (24 Mar 2020 16:10)  T(F): 98.1 (25 Mar 2020 09:28), Max: 102.5 (24 Mar 2020 16:10)  HR: 73 (25 Mar 2020 08:22) (73 - 92)  BP: 108/73 (25 Mar 2020 08:22) (100/68 - 120/71)  BP(mean): 72 (24 Mar 2020 12:32) (72 - 72)  RR: 20 (25 Mar 2020 08:22) (20 - 20)  SpO2: 93% (25 Mar 2020 08:22) (93% - 99%)  GENERAL: No acute distress  NEURO: Alert and oriented x 3  LUNGS: Clear to auscultation bilaterally, no rales or wheezes  CV: S1/S2, no murmur  ABDOMEN: +BS, nontender  EXTREMITIES: no clubbing, cyanosis, edema    LABS/IMAGING: reviewed                                   13.1   4.91  )-----------( 150      ( 25 Mar 2020 07:07 )             38.7   03-25    134<L>  |  94<L>  |  8   ----------------------------<  91  3.1<L>   |  25  |  0.85    Ca    8.1<L>      25 Mar 2020 07:04    TPro  6.4  /  Alb  3.3  /  TBili  0.7  /  DBili  x   /  AST  34  /  ALT  29  /  AlkPhos  52  03-25      < from: Xray Chest 1 View AP/PA (03.24.20 @ 01:58) >    IMPRESSION: Scattered patchy opacities, likely representing multifocal infection (differential considerations include viral pneumonia, such as COVID 19).      < end of copied text >  < from: Xray Chest 1 View AP/PA (03.24.20 @ 01:58) >    IMPRESSION: Scattered patchy opacities, likely representing multifocal infection (differential considerations include viral pneumonia, such as COVID 19).      < end of copied text >      PROBLEM LIST:  44yMale with HEALTH ISSUES - PROBLEM Dx:  COVID positive        RECS:  -hydroxychloroquine protocol (ordered), monitor EKG, qtc  -Monitor O2 sat  -supportive care  -K, mg supplementation  -fu labs      please feel free to call with any questions [phone]  Elvira Niel MD

## 2020-03-25 NOTE — PROGRESS NOTE ADULT - PROBLEM SELECTOR PLAN 2
COVID 19 positive. Reviewed CXR- b/L infiltrate. Blood c/s neg  - On Plaquenil and azithromycin  - spoke to ID to see if he will be in clinical trial  - EKG in am for QTc

## 2020-03-25 NOTE — PROGRESS NOTE ADULT - PROBLEM SELECTOR PLAN 1
Hypoxic on exertion, O2 sat 96% on 3L in rest 2/2 COVID 19. Has been having fever  101F  - c/w O2 2-3L via NC, if needed will use NRB  - Pulmonary, ID evaluated

## 2020-03-25 NOTE — PROGRESS NOTE ADULT - SUBJECTIVE AND OBJECTIVE BOX
Mohsin Khan, MD  Attending Physician, Division Of Hospital Medicine  Pager: [phone], Office: [phone]  Off hour pager: [phone]    Patient is a 44y old  Male who presents with a chief complaint of hypoxia COVID19+    SUBJECTIVE / OVERNIGHT EVENTS:  Seen, examined the patient this am  Resting in bed, c/o SOB on exertion or cough, no chest pain, having fever 101F. -111  Needed O2 1-3 L via NC, O2 sat 96%      MEDICATIONS  (STANDING):  azithromycin   Tablet 250 milliGRAM(s) Oral daily  famotidine    Tablet 20 milliGRAM(s) Oral two times a day  hydroxychloroquine   Oral   hydroxychloroquine 200 milliGRAM(s) Oral every 12 hours  potassium chloride    Tablet ER 40 milliEquivalent(s) Oral once    MEDICATIONS  (PRN):  acetaminophen   Tablet .. 650 milliGRAM(s) Oral every 6 hours PRN Temp greater or equal to 38C (100.4F), Mild Pain (1 - 3), Moderate Pain (4 - 6)  gabapentin 300 milliGRAM(s) Oral three times a day PRN pain  ondansetron    Tablet 4 milliGRAM(s) Oral every 8 hours PRN Nausea and/or Vomiting      Vital Signs Last 24 Hrs  T(C): 38.2 (25 Mar 2020 14:15), Max: 39.2 (24 Mar 2020 16:10)  T(F): 100.8 (25 Mar 2020 14:15), Max: 102.5 (24 Mar 2020 16:10)  HR: 90 (25 Mar 2020 14:15) (73 - 92)  BP: 102/71 (25 Mar 2020 14:15) (100/68 - 120/71)  BP(mean): --  RR: 21 (25 Mar 2020 14:15) (20 - 21)  SpO2: 96% (25 Mar 2020 14:15) (93% - 96%)  CAPILLARY BLOOD GLUCOSE        I&O's Summary    24 Mar 2020 07:01  -  25 Mar 2020 07:00  --------------------------------------------------------  IN: 960 mL / OUT: 0 mL / NET: 960 mL    25 Mar 2020 07:01  -  25 Mar 2020 15:40  --------------------------------------------------------  IN: 480 mL / OUT: 800 mL / NET: -320 mL        PHYSICAL EXAM:-  GENERAL: NAD, well-developed  EYES: EOMI, PERRLA, conjunctiva and sclera clear  NECK: Supple, No JVD, no thyromegaly  CHEST/LUNG: Clear to auscultation bilaterally; No wheeze  HEART: Regular rate and rhythm; S1, S2 audible, No murmurs, rubs, or gallops  ABDOMEN: Soft, Nontender, Nondistended; Bowel sounds present  EXTREMITIES:  2+ Peripheral Pulses, No clubbing, cyanosis, or edema  NEURO: AAOx3, no focal deficit      LABS:                        13.1   4.91  )-----------( 150      ( 25 Mar 2020 07:07 )             38.7     03-25    134<L>  |  94<L>  |  8   ----------------------------<  91  3.1<L>   |  25  |  0.85    Ca    8.1<L>      25 Mar 2020 07:04    TPro  6.4  /  Alb  3.3  /  TBili  0.7  /  DBili  x   /  AST  34  /  ALT  29  /  AlkPhos  52  03-25    RADIOLOGY & ADDITIONAL TESTS:    Imaging Personally Reviewed: CXR  Consultant(s) Notes Reviewed:  ID, pulmonary  Care Discussed with Consultants/Other Providers: ID, Pulmonary

## 2020-03-25 NOTE — CONSULT NOTE ADULT - ASSESSMENT
44 year old with COVID: mild hypoxia many diffuse complaints including HA, abd, discomfort, nausea, ongoing fever.  Pt on hydroxychloroquine.   I will disuses with team whether he is a candidate for our trials

## 2020-03-26 LAB
ALBUMIN SERPL ELPH-MCNC: 3.4 G/DL — SIGNIFICANT CHANGE UP (ref 3.3–5)
ALP SERPL-CCNC: 57 U/L — SIGNIFICANT CHANGE UP (ref 40–120)
ALT FLD-CCNC: 29 U/L — SIGNIFICANT CHANGE UP (ref 10–45)
ANION GAP SERPL CALC-SCNC: 16 MMOL/L — SIGNIFICANT CHANGE UP (ref 5–17)
AST SERPL-CCNC: 35 U/L — SIGNIFICANT CHANGE UP (ref 10–40)
BILIRUB SERPL-MCNC: 0.8 MG/DL — SIGNIFICANT CHANGE UP (ref 0.2–1.2)
BUN SERPL-MCNC: 10 MG/DL — SIGNIFICANT CHANGE UP (ref 7–23)
CALCIUM SERPL-MCNC: 8.9 MG/DL — SIGNIFICANT CHANGE UP (ref 8.4–10.5)
CHLORIDE SERPL-SCNC: 90 MMOL/L — LOW (ref 96–108)
CO2 SERPL-SCNC: 27 MMOL/L — SIGNIFICANT CHANGE UP (ref 22–31)
CREAT SERPL-MCNC: 0.76 MG/DL — SIGNIFICANT CHANGE UP (ref 0.5–1.3)
GLUCOSE SERPL-MCNC: 91 MG/DL — SIGNIFICANT CHANGE UP (ref 70–99)
HCT VFR BLD CALC: 42.5 % — SIGNIFICANT CHANGE UP (ref 39–50)
HGB BLD-MCNC: 14.3 G/DL — SIGNIFICANT CHANGE UP (ref 13–17)
LDH SERPL L TO P-CCNC: 358 U/L — HIGH (ref 50–242)
MCHC RBC-ENTMCNC: 28.3 PG — SIGNIFICANT CHANGE UP (ref 27–34)
MCHC RBC-ENTMCNC: 33.6 GM/DL — SIGNIFICANT CHANGE UP (ref 32–36)
MCV RBC AUTO: 84 FL — SIGNIFICANT CHANGE UP (ref 80–100)
NRBC # BLD: 0 /100 WBCS — SIGNIFICANT CHANGE UP (ref 0–0)
PLATELET # BLD AUTO: 180 K/UL — SIGNIFICANT CHANGE UP (ref 150–400)
POTASSIUM SERPL-MCNC: 3.4 MMOL/L — LOW (ref 3.5–5.3)
POTASSIUM SERPL-SCNC: 3.4 MMOL/L — LOW (ref 3.5–5.3)
PROT SERPL-MCNC: 7.1 G/DL — SIGNIFICANT CHANGE UP (ref 6–8.3)
RBC # BLD: 5.06 M/UL — SIGNIFICANT CHANGE UP (ref 4.2–5.8)
RBC # FLD: 12.8 % — SIGNIFICANT CHANGE UP (ref 10.3–14.5)
SODIUM SERPL-SCNC: 133 MMOL/L — LOW (ref 135–145)
WBC # BLD: 5.6 K/UL — SIGNIFICANT CHANGE UP (ref 3.8–10.5)
WBC # FLD AUTO: 5.6 K/UL — SIGNIFICANT CHANGE UP (ref 3.8–10.5)

## 2020-03-26 PROCEDURE — 99233 SBSQ HOSP IP/OBS HIGH 50: CPT

## 2020-03-26 RX ORDER — DIPHENHYDRAMINE HCL 50 MG
50 CAPSULE ORAL ONCE
Refills: 0 | Status: DISCONTINUED | OUTPATIENT
Start: 2020-03-26 | End: 2020-03-31

## 2020-03-26 RX ORDER — EPINEPHRINE 0.3 MG/.3ML
0.3 INJECTION INTRAMUSCULAR; SUBCUTANEOUS ONCE
Refills: 0 | Status: DISCONTINUED | OUTPATIENT
Start: 2020-03-26 | End: 2020-03-31

## 2020-03-26 RX ORDER — POTASSIUM CHLORIDE 20 MEQ
20 PACKET (EA) ORAL ONCE
Refills: 0 | Status: DISCONTINUED | OUTPATIENT
Start: 2020-03-26 | End: 2020-03-26

## 2020-03-26 RX ORDER — HEPARIN SODIUM 5000 [USP'U]/ML
5000 INJECTION INTRAVENOUS; SUBCUTANEOUS EVERY 8 HOURS
Refills: 0 | Status: DISCONTINUED | OUTPATIENT
Start: 2020-03-26 | End: 2020-03-31

## 2020-03-26 RX ORDER — ONDANSETRON 8 MG/1
4 TABLET, FILM COATED ORAL EVERY 6 HOURS
Refills: 0 | Status: DISCONTINUED | OUTPATIENT
Start: 2020-03-26 | End: 2020-03-31

## 2020-03-26 RX ORDER — POTASSIUM PHOSPHATE, MONOBASIC POTASSIUM PHOSPHATE, DIBASIC 236; 224 MG/ML; MG/ML
15 INJECTION, SOLUTION INTRAVENOUS ONCE
Refills: 0 | Status: COMPLETED | OUTPATIENT
Start: 2020-03-26 | End: 2020-03-26

## 2020-03-26 RX ORDER — SODIUM CHLORIDE 0.65 %
1 AEROSOL, SPRAY (ML) NASAL
Refills: 0 | Status: DISCONTINUED | OUTPATIENT
Start: 2020-03-26 | End: 2020-03-31

## 2020-03-26 RX ADMIN — Medication 200 MILLIGRAM(S): at 06:13

## 2020-03-26 RX ADMIN — Medication 650 MILLIGRAM(S): at 01:59

## 2020-03-26 RX ADMIN — Medication 650 MILLIGRAM(S): at 14:33

## 2020-03-26 RX ADMIN — FAMOTIDINE 20 MILLIGRAM(S): 10 INJECTION INTRAVENOUS at 18:24

## 2020-03-26 RX ADMIN — Medication 200 MILLIGRAM(S): at 18:24

## 2020-03-26 RX ADMIN — ONDANSETRON 4 MILLIGRAM(S): 8 TABLET, FILM COATED ORAL at 22:11

## 2020-03-26 RX ADMIN — Medication 650 MILLIGRAM(S): at 22:11

## 2020-03-26 RX ADMIN — Medication 650 MILLIGRAM(S): at 02:30

## 2020-03-26 RX ADMIN — HEPARIN SODIUM 5000 UNIT(S): 5000 INJECTION INTRAVENOUS; SUBCUTANEOUS at 22:07

## 2020-03-26 RX ADMIN — AZITHROMYCIN 250 MILLIGRAM(S): 500 TABLET, FILM COATED ORAL at 12:00

## 2020-03-26 RX ADMIN — Medication 40 MILLIEQUIVALENT(S): at 12:00

## 2020-03-26 RX ADMIN — Medication 1 SPRAY(S): at 18:26

## 2020-03-26 RX ADMIN — HEPARIN SODIUM 5000 UNIT(S): 5000 INJECTION INTRAVENOUS; SUBCUTANEOUS at 14:33

## 2020-03-26 RX ADMIN — POTASSIUM PHOSPHATE, MONOBASIC POTASSIUM PHOSPHATE, DIBASIC 62.5 MILLIMOLE(S): 236; 224 INJECTION, SOLUTION INTRAVENOUS at 22:08

## 2020-03-26 RX ADMIN — Medication 650 MILLIGRAM(S): at 22:41

## 2020-03-26 RX ADMIN — FAMOTIDINE 20 MILLIGRAM(S): 10 INJECTION INTRAVENOUS at 06:13

## 2020-03-26 NOTE — PROGRESS NOTE ADULT - SUBJECTIVE AND OBJECTIVE BOX
infectious diseases progress note:    Patient is a 44y old  Male who presents with a chief complaint of hypoxia COVID19+ (25 Mar 2020 15:40)        Shortness of breath             Allergies    No Known Allergies    Intolerances        ANTIBIOTICS/RELEVANT:  antimicrobials  azithromycin   Tablet 250 milliGRAM(s) Oral daily  hydroxychloroquine   Oral   hydroxychloroquine 200 milliGRAM(s) Oral every 12 hours    immunologic:    OTHER:  acetaminophen   Tablet .. 650 milliGRAM(s) Oral every 6 hours PRN  famotidine    Tablet 20 milliGRAM(s) Oral two times a day  gabapentin 300 milliGRAM(s) Oral three times a day PRN  ondansetron    Tablet 4 milliGRAM(s) Oral every 8 hours PRN  potassium chloride    Tablet ER 40 milliEquivalent(s) Oral once      Objective:  Vital Signs Last 24 Hrs  T(C): 36.8 (26 Mar 2020 06:32), Max: 38.5 (26 Mar 2020 01:38)  T(F): 98.3 (26 Mar 2020 06:32), Max: 101.3 (26 Mar 2020 01:38)  HR: 96 (26 Mar 2020 06:32) (73 - 96)  BP: 100/68 (26 Mar 2020 06:32) (100/68 - 111/74)  BP(mean): --  RR: 20 (26 Mar 2020 06:32) (20 - 21)  SpO2: 96% (26 Mar 2020 06:32) (93% - 99%)       Eyes:LUCIA, EOMI  Ear/Nose/Throat: no oral lesion, no sinus tenderness on percussion	  Neck:no JVD, no lymphadenopathy, supple  Respiratory: CTA edith  Cardiovascular: S1S2 RRR, no murmurs  Gastrointestinal:soft, (+) BS, no HSM  Extremities:no e/e/c        LABS:                        13.1   4.91  )-----------( 150      ( 25 Mar 2020 07:07 )             38.7     03-25    x   |  x   |  x   ----------------------------<  x   3.2<L>   |  x   |  x     Ca    8.1<L>      25 Mar 2020 07:04  Phos  2.3     03-25  Mg     2.3     03-25    TPro  6.4  /  Alb  3.3  /  TBili  0.7  /  DBili  x   /  AST  34  /  ALT  29  /  AlkPhos  52  03-25            MICROBIOLOGY:    RECENT CULTURES:  03-24 @ 04:07 .Blood Blood-Peripheral                No growth to date.          RESPIRATORY CULTURES:              RADIOLOGY & ADDITIONAL STUDIES:        Pager [phone]  After 5 pm/weekends or if no response :[phone]

## 2020-03-26 NOTE — PROGRESS NOTE ADULT - SUBJECTIVE AND OBJECTIVE BOX
Pulmonary Consult Note    CAROLANN RAMIRES  MRN-[id number]    Chief Complaint: Patient is a 44y old  Male who presents with a chief complaint of     HPI:  tired, sat 95% on 4.5L  stable      ROS:  -poor appetite, + headache  All other systems reviewed and negative      SOCIAL HISTORY: former smoker, quit 2015    MEDICATIONS  (STANDING):  azithromycin   Tablet 250 milliGRAM(s) Oral daily  famotidine    Tablet 20 milliGRAM(s) Oral two times a day  heparin  Injectable 5000 Unit(s) SubCutaneous every 8 hours  hydroxychloroquine   Oral   hydroxychloroquine 200 milliGRAM(s) Oral every 12 hours  potassium chloride    Tablet ER 40 milliEquivalent(s) Oral once    MEDICATIONS  (PRN):  acetaminophen   Tablet .. 650 milliGRAM(s) Oral every 6 hours PRN Temp greater or equal to 38C (100.4F), Mild Pain (1 - 3), Moderate Pain (4 - 6)  diphenhydrAMINE   Injectable 50 milliGRAM(s) IV Push once PRN anaphylactic reaction to study drug  EPINEPHrine     1 mG/mL Injectable 0.3 milliGRAM(s) IntraMuscular once PRN anaphylactic reaction to study drug  gabapentin 300 milliGRAM(s) Oral three times a day PRN pain  ondansetron    Tablet 4 milliGRAM(s) Oral every 8 hours PRN Nausea and/or Vomiting          EXAM:  Vital Signs Last 24 Hrs  T(C): 36.9 (26 Mar 2020 08:58), Max: 38.5 (26 Mar 2020 01:38)  T(F): 98.5 (26 Mar 2020 08:58), Max: 101.3 (26 Mar 2020 01:38)  HR: 92 (26 Mar 2020 08:58) (90 - 96)  BP: 115/78 (26 Mar 2020 08:58) (100/68 - 115/78)  BP(mean): --  RR: 20 (26 Mar 2020 08:58) (20 - 21)  SpO2: 96% (26 Mar 2020 08:58) (96% - 99%)  GENERAL: No acute distress  NEURO: Alert and oriented x 3  LUNGS: Clear to auscultation bilaterally, no rales or wheezes  CV: S1/S2  EXTREMITIES: no clubbing, cyanosis, edema    LABS/IMAGING: reviewed                                   14.3   5.60  )-----------( 180      ( 26 Mar 2020 10:00 )             42.5   03-26    133<L>  |  90<L>  |  10  ----------------------------<  91  3.4<L>   |  27  |  0.76    Ca    8.9      26 Mar 2020 10:00  Phos  2.3     03-25  Mg     2.3     03-25    TPro  7.1  /  Alb  3.4  /  TBili  0.8  /  DBili  x   /  AST  35  /  ALT  29  /  AlkPhos  57  03-26        < from: Xray Chest 1 View AP/PA (03.24.20 @ 01:58) >    IMPRESSION: Scattered patchy opacities, likely representing multifocal infection (differential considerations include viral pneumonia, such as COVID 19).      < end of copied text >  < from: Xray Chest 1 View AP/PA (03.24.20 @ 01:58) >    IMPRESSION: Scattered patchy opacities, likely representing multifocal infection (differential considerations include viral pneumonia, such as COVID 19).      < end of copied text >      PROBLEM LIST:  44yMale with HEALTH ISSUES - PROBLEM Dx:  COVID positive        RECS:  -hydroxychloroquine/azithro per ID, remdesivir or anti IL-6 study?  -Monitor O2 sat  -supportive care        please feel free to call with any questions [phone]  Elvira Neil MD

## 2020-03-26 NOTE — PROGRESS NOTE ADULT - ASSESSMENT
43 yo male with PMHx Nephrolithiases and chronic lower back pain w/ sciatica presented to [facility] c/o fever, cough and worsening SOB, + COVID admitted for viral PNA.

## 2020-03-26 NOTE — PROGRESS NOTE ADULT - ASSESSMENT
clinically stable   still febrile   no signs of inttraabd pathology eating small amounts of food   awaiting remdesivir trail  will need to stop the the Zithromax and hydroxychloroquine if accepted   continue supportive care

## 2020-03-26 NOTE — PROGRESS NOTE ADULT - SUBJECTIVE AND OBJECTIVE BOX
Mohsin Khan, MD  Attending Physician, Division Of Hospital Medicine  Pager: [phone], Office: [phone]  Off hour pager: [phone]    Patient is a 44y old  Male who presents with a chief complaint of hypoxia COVID19+    SUBJECTIVE / OVERNIGHT EVENTS:  Seen, examined the patient this am  Resting in bed, c/o nausea, low appetite no abdominal pain but mild SOB. Tmax 101.3F, O2 sat 96% in 3L o2. otherwise hemodynamically stable      MEDICATIONS  (STANDING):  azithromycin   Tablet 250 milliGRAM(s) Oral daily  famotidine    Tablet 20 milliGRAM(s) Oral two times a day  heparin  Injectable 5000 Unit(s) SubCutaneous every 8 hours  hydroxychloroquine   Oral   hydroxychloroquine 200 milliGRAM(s) Oral every 12 hours  investigational medication - IVPB 200 milliGRAM(s) IV Intermittent once  potassium phosphate IVPB 15 milliMole(s) IV Intermittent once    MEDICATIONS  (PRN):  acetaminophen   Tablet .. 650 milliGRAM(s) Oral every 6 hours PRN Temp greater or equal to 38C (100.4F), Mild Pain (1 - 3), Moderate Pain (4 - 6)  diphenhydrAMINE   Injectable 50 milliGRAM(s) IV Push once PRN anaphylactic reaction to study drug  EPINEPHrine     1 mG/mL Injectable 0.3 milliGRAM(s) IntraMuscular once PRN anaphylactic reaction to study drug  gabapentin 300 milliGRAM(s) Oral three times a day PRN pain  ondansetron    Tablet 4 milliGRAM(s) Oral every 8 hours PRN Nausea and/or Vomiting  ondansetron Injectable 4 milliGRAM(s) IV Push every 6 hours PRN Nausea and/or Vomiting      Vital Signs Last 24 Hrs  T(C): 36.9 (26 Mar 2020 08:58), Max: 38.5 (26 Mar 2020 01:38)  T(F): 98.5 (26 Mar 2020 08:58), Max: 101.3 (26 Mar 2020 01:38)  HR: 92 (26 Mar 2020 08:58) (90 - 96)  BP: 115/78 (26 Mar 2020 08:58) (100/68 - 115/78)  BP(mean): --  RR: 20 (26 Mar 2020 08:58) (20 - 21)  SpO2: 96% (26 Mar 2020 08:58) (96% - 99%)  CAPILLARY BLOOD GLUCOSE        I&O's Summary    25 Mar 2020 07:01  -  26 Mar 2020 07:00  --------------------------------------------------------  IN: 720 mL / OUT: 1300 mL / NET: -580 mL    26 Mar 2020 07:01  -  26 Mar 2020 13:36  --------------------------------------------------------  IN: 0 mL / OUT: 600 mL / NET: -600 mL        PHYSICAL EXAM:-  GENERAL: NAD, well-developed  EYES: EOMI, PERRLA, conjunctiva and sclera clear  NECK: Supple, No JVD, no thyromegaly  CHEST/LUNG: Clear to auscultation bilaterally; No wheeze  HEART: Regular rate and rhythm; S1, S2 audible, No murmurs, rubs, or gallops  ABDOMEN: Soft, Nontender, Nondistended; Bowel sounds present  EXTREMITIES:  2+ Peripheral Pulses, No clubbing, cyanosis, or edema  NEURO: AAOx3, no focal deficit      LABS:                        14.3   5.60  )-----------( 180      ( 26 Mar 2020 10:00 )             42.5     03-26    133<L>  |  90<L>  |  10  ----------------------------<  91  3.4<L>   |  27  |  0.76    Ca    8.9      26 Mar 2020 10:00  Phos  2.3     03-25  Mg     2.3     03-25    TPro  7.1  /  Alb  3.4  /  TBili  0.8  /  DBili  x   /  AST  35  /  ALT  29  /  AlkPhos  57  03-26    RADIOLOGY & ADDITIONAL TESTS:    Imaging Personally Reviewed: CT chest  Consultant(s) Notes Reviewed: ID, Pulmonary  Care Discussed with Consultants/Other Providers: ID, Pulmonary

## 2020-03-26 NOTE — CHART NOTE - NSCHARTNOTEFT_GEN_A_CORE
I explained the clinical trial to the patient and to his two brothers her were listening on the telephone . I was able to answer questions from the patient and the two brothers. After the discussion, the patient willfully signed informed consent  A copy of the informed consent was left with the patient

## 2020-03-26 NOTE — PROGRESS NOTE ADULT - PROBLEM SELECTOR PLAN 1
Hypoxic 2/2 COVID 19, O2 sat 96% on 3L. Has been having fever  101.3F  - c/w O2 2-3L via NC, if needed will use NRB  - Pulmonary, ID evaluated, agreed with current plan

## 2020-03-26 NOTE — PROGRESS NOTE ADULT - PROBLEM SELECTOR PLAN 2
COVID 19 positive. Reviewed CXR- b/L infiltrate. Blood c/s neg, WBC 5.6  - On Plaquenil and azithromycin  - per ID he is in clinical trial  - EKG showed QTc 458

## 2020-03-26 NOTE — PROVIDER CONTACT NOTE (OTHER) - ACTION/TREATMENT ORDERED:
give PO tylenol and apply ice packs as per NICOLE Monroy. Tylenol given, ice packs applied. Will continue to monitor.

## 2020-03-27 LAB
ALBUMIN SERPL ELPH-MCNC: 3.2 G/DL — LOW (ref 3.3–5)
ALP SERPL-CCNC: 58 U/L — SIGNIFICANT CHANGE UP (ref 40–120)
ALT FLD-CCNC: 36 U/L — SIGNIFICANT CHANGE UP (ref 10–45)
ANION GAP SERPL CALC-SCNC: 14 MMOL/L — SIGNIFICANT CHANGE UP (ref 5–17)
AST SERPL-CCNC: 43 U/L — HIGH (ref 10–40)
BASOPHILS # BLD AUTO: 0 K/UL — SIGNIFICANT CHANGE UP (ref 0–0.2)
BASOPHILS NFR BLD AUTO: 0 % — SIGNIFICANT CHANGE UP (ref 0–2)
BILIRUB SERPL-MCNC: 0.6 MG/DL — SIGNIFICANT CHANGE UP (ref 0.2–1.2)
BUN SERPL-MCNC: 11 MG/DL — SIGNIFICANT CHANGE UP (ref 7–23)
CALCIUM SERPL-MCNC: 8.9 MG/DL — SIGNIFICANT CHANGE UP (ref 8.4–10.5)
CHLORIDE SERPL-SCNC: 91 MMOL/L — LOW (ref 96–108)
CO2 SERPL-SCNC: 26 MMOL/L — SIGNIFICANT CHANGE UP (ref 22–31)
CREAT SERPL-MCNC: 0.78 MG/DL — SIGNIFICANT CHANGE UP (ref 0.5–1.3)
CRP SERPL-MCNC: 13.38 MG/DL — HIGH (ref 0–0.4)
EOSINOPHIL # BLD AUTO: 0 K/UL — SIGNIFICANT CHANGE UP (ref 0–0.5)
EOSINOPHIL NFR BLD AUTO: 0 % — SIGNIFICANT CHANGE UP (ref 0–6)
GLUCOSE SERPL-MCNC: 99 MG/DL — SIGNIFICANT CHANGE UP (ref 70–99)
HCT VFR BLD CALC: 38.6 % — LOW (ref 39–50)
HGB BLD-MCNC: 13.1 G/DL — SIGNIFICANT CHANGE UP (ref 13–17)
LYMPHOCYTES # BLD AUTO: 0.57 K/UL — LOW (ref 1–3.3)
LYMPHOCYTES # BLD AUTO: 11 % — LOW (ref 13–44)
MANUAL SMEAR VERIFICATION: SIGNIFICANT CHANGE UP
MCHC RBC-ENTMCNC: 28.5 PG — SIGNIFICANT CHANGE UP (ref 27–34)
MCHC RBC-ENTMCNC: 33.9 GM/DL — SIGNIFICANT CHANGE UP (ref 32–36)
MCV RBC AUTO: 83.9 FL — SIGNIFICANT CHANGE UP (ref 80–100)
MONOCYTES # BLD AUTO: 0.21 K/UL — SIGNIFICANT CHANGE UP (ref 0–0.9)
MONOCYTES NFR BLD AUTO: 4 % — SIGNIFICANT CHANGE UP (ref 2–14)
NEUTROPHILS # BLD AUTO: 4.16 K/UL — SIGNIFICANT CHANGE UP (ref 1.8–7.4)
NEUTROPHILS NFR BLD AUTO: 80 % — HIGH (ref 43–77)
NRBC # BLD: 0 /100 — SIGNIFICANT CHANGE UP (ref 0–0)
PHOSPHATE SERPL-MCNC: 2.9 MG/DL — SIGNIFICANT CHANGE UP (ref 2.5–4.5)
PLAT MORPH BLD: NORMAL — SIGNIFICANT CHANGE UP
PLATELET # BLD AUTO: 219 K/UL — SIGNIFICANT CHANGE UP (ref 150–400)
POTASSIUM SERPL-MCNC: 3.6 MMOL/L — SIGNIFICANT CHANGE UP (ref 3.5–5.3)
POTASSIUM SERPL-SCNC: 3.6 MMOL/L — SIGNIFICANT CHANGE UP (ref 3.5–5.3)
PROT SERPL-MCNC: 6.9 G/DL — SIGNIFICANT CHANGE UP (ref 6–8.3)
RBC # BLD: 4.6 M/UL — SIGNIFICANT CHANGE UP (ref 4.2–5.8)
RBC # FLD: 12.7 % — SIGNIFICANT CHANGE UP (ref 10.3–14.5)
RBC BLD AUTO: SIGNIFICANT CHANGE UP
SODIUM SERPL-SCNC: 131 MMOL/L — LOW (ref 135–145)
VARIANT LYMPHS # BLD: 5 % — SIGNIFICANT CHANGE UP (ref 0–6)
WBC # BLD: 5.2 K/UL — SIGNIFICANT CHANGE UP (ref 3.8–10.5)
WBC # FLD AUTO: 5.2 K/UL — SIGNIFICANT CHANGE UP (ref 3.8–10.5)

## 2020-03-27 PROCEDURE — 93010 ELECTROCARDIOGRAM REPORT: CPT

## 2020-03-27 PROCEDURE — 99233 SBSQ HOSP IP/OBS HIGH 50: CPT

## 2020-03-27 PROCEDURE — 99232 SBSQ HOSP IP/OBS MODERATE 35: CPT

## 2020-03-27 RX ADMIN — HEPARIN SODIUM 5000 UNIT(S): 5000 INJECTION INTRAVENOUS; SUBCUTANEOUS at 22:24

## 2020-03-27 RX ADMIN — Medication 650 MILLIGRAM(S): at 22:25

## 2020-03-27 RX ADMIN — ONDANSETRON 4 MILLIGRAM(S): 8 TABLET, FILM COATED ORAL at 13:51

## 2020-03-27 RX ADMIN — AZITHROMYCIN 250 MILLIGRAM(S): 500 TABLET, FILM COATED ORAL at 13:13

## 2020-03-27 RX ADMIN — Medication 650 MILLIGRAM(S): at 13:44

## 2020-03-27 RX ADMIN — Medication 200 MILLIGRAM(S): at 05:29

## 2020-03-27 RX ADMIN — HEPARIN SODIUM 5000 UNIT(S): 5000 INJECTION INTRAVENOUS; SUBCUTANEOUS at 13:13

## 2020-03-27 RX ADMIN — Medication 650 MILLIGRAM(S): at 13:14

## 2020-03-27 RX ADMIN — FAMOTIDINE 20 MILLIGRAM(S): 10 INJECTION INTRAVENOUS at 17:04

## 2020-03-27 RX ADMIN — Medication 650 MILLIGRAM(S): at 05:29

## 2020-03-27 RX ADMIN — HEPARIN SODIUM 5000 UNIT(S): 5000 INJECTION INTRAVENOUS; SUBCUTANEOUS at 05:29

## 2020-03-27 RX ADMIN — FAMOTIDINE 20 MILLIGRAM(S): 10 INJECTION INTRAVENOUS at 05:29

## 2020-03-27 RX ADMIN — Medication 200 MILLIGRAM(S): at 17:03

## 2020-03-27 NOTE — PROGRESS NOTE ADULT - SUBJECTIVE AND OBJECTIVE BOX
Pulmonary Consult Note    CAROLANN RAMIRES  MRN-[id number]    Chief Complaint: Patient is a 44y old  Male who presents with a chief complaint of     HPI:  tired, sat 95% on 5L  stable      ROS:  neg      SOCIAL HISTORY: former smoker, quit 2015    MEDICATIONS  (STANDING):  azithromycin   Tablet 250 milliGRAM(s) Oral daily  famotidine    Tablet 20 milliGRAM(s) Oral two times a day  heparin  Injectable 5000 Unit(s) SubCutaneous every 8 hours  hydroxychloroquine   Oral   hydroxychloroquine 200 milliGRAM(s) Oral every 12 hours    MEDICATIONS  (PRN):  acetaminophen   Tablet .. 650 milliGRAM(s) Oral every 6 hours PRN Temp greater or equal to 38C (100.4F), Mild Pain (1 - 3), Moderate Pain (4 - 6)  diphenhydrAMINE   Injectable 50 milliGRAM(s) IV Push once PRN anaphylactic reaction to study drug  EPINEPHrine     1 mG/mL Injectable 0.3 milliGRAM(s) IntraMuscular once PRN anaphylactic reaction to study drug  gabapentin 300 milliGRAM(s) Oral three times a day PRN pain  ondansetron Injectable 4 milliGRAM(s) IV Push every 6 hours PRN Nausea and/or Vomiting  sodium chloride 0.65% Nasal 1 Spray(s) Both Nostrils every 3 hours PRN Nasal Congestion      EXAM:  Vital Signs Last 24 Hrs  T(C): 36.7 (27 Mar 2020 09:30), Max: 38.8 (26 Mar 2020 22:02)  T(F): 98.1 (27 Mar 2020 09:30), Max: 101.8 (26 Mar 2020 22:02)  HR: 86 (27 Mar 2020 09:30) (82 - 101)  BP: 102/71 (27 Mar 2020 09:30) (96/66 - 119/78)  BP(mean): --  RR: 25 (27 Mar 2020 09:30) (18 - 25)  SpO2: 94% (27 Mar 2020 09:30) (91% - 94%)  GENERAL: No acute distress  NEURO: Alert and oriented x 3  LUNGS: Clear to auscultation bilaterally, no rales or wheezes  CV: S1/S2  EXTREMITIES: no clubbing, cyanosis, edema    LABS/IMAGING: reviewed                                   14.3   5.60  )-----------( 180      ( 26 Mar 2020 10:00 )             42.5   03-26    133<L>  |  90<L>  |  10  ----------------------------<  91  3.4<L>   |  27  |  0.76    Ca    8.9      26 Mar 2020 10:00  Phos  2.3     03-25  Mg     2.3     03-25    TPro  7.1  /  Alb  3.4  /  TBili  0.8  /  DBili  x   /  AST  35  /  ALT  29  /  AlkPhos  57  03-26        < from: Xray Chest 1 View AP/PA (03.24.20 @ 01:58) >    IMPRESSION: Scattered patchy opacities, likely representing multifocal infection (differential considerations include viral pneumonia, such as COVID 19).      < end of copied text >  < from: Xray Chest 1 View AP/PA (03.24.20 @ 01:58) >    IMPRESSION: Scattered patchy opacities, likely representing multifocal infection (differential considerations include viral pneumonia, such as COVID 19).      < end of copied text >      PROBLEM LIST:  44yMale with HEALTH ISSUES - PROBLEM Dx:  COVID positive        RECS:  -hydroxychloroquine/azithro, sarulimab trial per ID  -Monitor O2 sat  -supportive care  -incentiv damien        please feel free to call with any questions [phone]  Elvira Neil MD

## 2020-03-27 NOTE — PROGRESS NOTE ADULT - ASSESSMENT
clinically stable   still febrile   no signs of inttraabd pathology eating small amounts of food    sp LID inhibitor yesterday   oxygen sats very good on nasal cannula  temps lower   continue present therapy  complete 5 days of hydroxychloroquine

## 2020-03-27 NOTE — PROGRESS NOTE ADULT - SUBJECTIVE AND OBJECTIVE BOX
infectious diseases progress note:    Patient is a 44y old  Male who presents with a chief complaint of hypoxia COVID19+ (26 Mar 2020 13:36)        Shortness of breath           Allergies    No Known Allergies    Intolerances        ANTIBIOTICS/RELEVANT:  antimicrobials  azithromycin   Tablet 250 milliGRAM(s) Oral daily  hydroxychloroquine   Oral   hydroxychloroquine 200 milliGRAM(s) Oral every 12 hours    immunologic:    OTHER:  acetaminophen   Tablet .. 650 milliGRAM(s) Oral every 6 hours PRN  diphenhydrAMINE   Injectable 50 milliGRAM(s) IV Push once PRN  EPINEPHrine     1 mG/mL Injectable 0.3 milliGRAM(s) IntraMuscular once PRN  famotidine    Tablet 20 milliGRAM(s) Oral two times a day  gabapentin 300 milliGRAM(s) Oral three times a day PRN  heparin  Injectable 5000 Unit(s) SubCutaneous every 8 hours  ondansetron Injectable 4 milliGRAM(s) IV Push every 6 hours PRN  sodium chloride 0.65% Nasal 1 Spray(s) Both Nostrils every 3 hours PRN      Objective:  Vital Signs Last 24 Hrs  T(C): 37.5 (27 Mar 2020 07:15), Max: 38.8 (26 Mar 2020 22:02)  T(F): 99.5 (27 Mar 2020 07:15), Max: 101.8 (26 Mar 2020 22:02)  HR: 89 (27 Mar 2020 04:01) (82 - 101)  BP: 113/78 (27 Mar 2020 04:01) (96/66 - 119/78)  BP(mean): --  RR: 20 (27 Mar 2020 04:01) (18 - 20)  SpO2: 94% (27 Mar 2020 04:01) (91% - 96%)     Eyes:LUCIA, EOMI  Ear/Nose/Throat: no oral lesion, no sinus tenderness on percussion	  Neck:no JVD, no lymphadenopathy, supple     Gastrointestinal:soft, (+) BS, no HSM  Extremities:no e/e/c        LABS:                        14.3   5.60  )-----------( 180      ( 26 Mar 2020 10:00 )             42.5     03-26    133<L>  |  90<L>  |  10  ----------------------------<  91  3.4<L>   |  27  |  0.76    Ca    8.9      26 Mar 2020 10:00  Phos  2.3     03-25  Mg     2.3     03-25    TPro  7.1  /  Alb  3.4  /  TBili  0.8  /  DBili  x   /  AST  35  /  ALT  29  /  AlkPhos  57  03-26            MICROBIOLOGY:    RECENT CULTURES:  03-24 @ 04:07 .Blood Blood-Peripheral                No growth to date.          RESPIRATORY CULTURES:              RADIOLOGY & ADDITIONAL STUDIES:        Pager [phone]  After 5 pm/weekends or if no response :[phone]

## 2020-03-27 NOTE — PROVIDER CONTACT NOTE (OTHER) - ACTION/TREATMENT ORDERED:
Tylenol and cold packs administered. Will continue to monitor as per Coco (NP). Tylenol and cold packs administered. Will continue to monitor as per Coco SCHRADER).

## 2020-03-27 NOTE — PROGRESS NOTE ADULT - PROBLEM SELECTOR PLAN 1
Hypoxic 2/2 COVID 19, O2 sat 94% on 3-5L. Has been having fever  101F  - on hydroxychloroquine/azithro, and Sarulimab trial per ID  - c/w O2 2-3L via NC, if needed will use NRB. ABG today  - Pulmonary, ID f/u plan noted

## 2020-03-27 NOTE — PROGRESS NOTE ADULT - ASSESSMENT
43 yo male with PMHx Nephrolithiases and chronic lower back pain w/ sciatica presented to [facility] c/o fever, cough and worsening SOB, + COVID 19 admitted for hypoxemia.

## 2020-03-27 NOTE — PROGRESS NOTE ADULT - PROBLEM SELECTOR PLAN 2
COVID 19 positive. Reviewed CXR- b/L infiltrate. Blood c/s neg, WBC wnl  - On Plaquenil and azithromycin   Got Serulimab yesterday as trial  - EKG showed QTc 458, repeat EKG

## 2020-03-27 NOTE — PROGRESS NOTE ADULT - SUBJECTIVE AND OBJECTIVE BOX
Mohsin Khan, MD  Attending Physician, Division Of Hospital Medicine  Pager: [phone], Office: [phone]  Off hour pager: [phone]    Patient is a 44y old  Male who presents with a chief complaint of hypoxia 2/2 COVID19+    SUBJECTIVE / OVERNIGHT EVENTS:  Seen, examined the patient this am  Resting in bed, feels overall better but requiring O2 via NC 3-5L, O2 sat 94%, Temp 101F  No abdominal pain, had BM. Has been having low appetite      MEDICATIONS  (STANDING):  azithromycin   Tablet 250 milliGRAM(s) Oral daily  famotidine    Tablet 20 milliGRAM(s) Oral two times a day  heparin  Injectable 5000 Unit(s) SubCutaneous every 8 hours  hydroxychloroquine   Oral   hydroxychloroquine 200 milliGRAM(s) Oral every 12 hours    MEDICATIONS  (PRN):  acetaminophen   Tablet .. 650 milliGRAM(s) Oral every 6 hours PRN Temp greater or equal to 38C (100.4F), Mild Pain (1 - 3), Moderate Pain (4 - 6)  diphenhydrAMINE   Injectable 50 milliGRAM(s) IV Push once PRN anaphylactic reaction to study drug  EPINEPHrine     1 mG/mL Injectable 0.3 milliGRAM(s) IntraMuscular once PRN anaphylactic reaction to study drug  gabapentin 300 milliGRAM(s) Oral three times a day PRN pain  ondansetron Injectable 4 milliGRAM(s) IV Push every 6 hours PRN Nausea and/or Vomiting  sodium chloride 0.65% Nasal 1 Spray(s) Both Nostrils every 3 hours PRN Nasal Congestion      Vital Signs Last 24 Hrs  T(C): 36.2 (27 Mar 2020 14:43), Max: 38.8 (26 Mar 2020 22:02)  T(F): 97.2 (27 Mar 2020 14:43), Max: 101.8 (26 Mar 2020 22:02)  HR: 88 (27 Mar 2020 14:43) (82 - 101)  BP: 106/73 (27 Mar 2020 14:43) (100/63 - 113/78)  BP(mean): --  RR: 20 (27 Mar 2020 14:43) (20 - 25)  SpO2: 94% (27 Mar 2020 14:43) (91% - 94%)  CAPILLARY BLOOD GLUCOSE        I&O's Summary    26 Mar 2020 07:01  -  27 Mar 2020 07:00  --------------------------------------------------------  IN: 720 mL / OUT: 1600 mL / NET: -880 mL    27 Mar 2020 07:01  -  27 Mar 2020 15:36  --------------------------------------------------------  IN: 480 mL / OUT: 0 mL / NET: 480 mL        PHYSICAL EXAM:-  GENERAL: NAD, well-developed  EYES: EOMI, PERRLA, conjunctiva and sclera clear  NECK: Supple, No JVD, no thyromegaly  CHEST/LUNG: Clear to auscultation bilaterally; No wheeze  HEART: Regular rate and rhythm; S1, S2 audible, No murmurs, rubs, or gallops  ABDOMEN: Soft, Nontender, Nondistended; Bowel sounds present  EXTREMITIES:  2+ Peripheral Pulses, No clubbing, cyanosis, or edema  NEURO: AAOx3, no focal deficit      LABS:                        13.1   5.20  )-----------( 219      ( 27 Mar 2020 11:04 )             38.6     03-27    131<L>  |  91<L>  |  11  ----------------------------<  99  3.6   |  26  |  0.78    Ca    8.9      27 Mar 2020 11:04  Phos  2.9     03-27  Mg     2.3     03-25    TPro  6.9  /  Alb  3.2<L>  /  TBili  0.6  /  DBili  x   /  AST  43<H>  /  ALT  36  /  AlkPhos  58  03-27      RADIOLOGY & ADDITIONAL TESTS:    Imaging Personally Reviewed: CXR  Consultant(s) Notes Reviewed: ID, Pulmonary  Care Discussed with Consultants/Other Providers: ID, Pulmonary

## 2020-03-27 NOTE — PROVIDER CONTACT NOTE (OTHER) - ASSESSMENT
Patient is A&Ox4, feels warm and febrile with a temp of 101.7 .Rest of VS stable see VS flowsheet. Patient is A&Ox4, feels warm and febrile with a temp of 101.7 . VS flowsheet. Patient is A&Ox4, feels warm and febrile with a temp of 101.7 . VS see VS flowsheet.

## 2020-03-28 LAB
ALBUMIN SERPL ELPH-MCNC: 3.3 G/DL — SIGNIFICANT CHANGE UP (ref 3.3–5)
ALP SERPL-CCNC: 63 U/L — SIGNIFICANT CHANGE UP (ref 40–120)
ALT FLD-CCNC: 38 U/L — SIGNIFICANT CHANGE UP (ref 10–45)
ANION GAP SERPL CALC-SCNC: 12 MMOL/L — SIGNIFICANT CHANGE UP (ref 5–17)
AST SERPL-CCNC: 39 U/L — SIGNIFICANT CHANGE UP (ref 10–40)
BILIRUB SERPL-MCNC: 0.6 MG/DL — SIGNIFICANT CHANGE UP (ref 0.2–1.2)
BUN SERPL-MCNC: 11 MG/DL — SIGNIFICANT CHANGE UP (ref 7–23)
CALCIUM SERPL-MCNC: 9.2 MG/DL — SIGNIFICANT CHANGE UP (ref 8.4–10.5)
CHLORIDE SERPL-SCNC: 91 MMOL/L — LOW (ref 96–108)
CO2 SERPL-SCNC: 30 MMOL/L — SIGNIFICANT CHANGE UP (ref 22–31)
CREAT SERPL-MCNC: 1 MG/DL — SIGNIFICANT CHANGE UP (ref 0.5–1.3)
CRP SERPL-MCNC: 9.97 MG/DL — HIGH (ref 0–0.4)
GLUCOSE SERPL-MCNC: 88 MG/DL — SIGNIFICANT CHANGE UP (ref 70–99)
HCT VFR BLD CALC: 40.3 % — SIGNIFICANT CHANGE UP (ref 39–50)
HGB BLD-MCNC: 13.6 G/DL — SIGNIFICANT CHANGE UP (ref 13–17)
MCHC RBC-ENTMCNC: 28.8 PG — SIGNIFICANT CHANGE UP (ref 27–34)
MCHC RBC-ENTMCNC: 33.7 GM/DL — SIGNIFICANT CHANGE UP (ref 32–36)
MCV RBC AUTO: 85.2 FL — SIGNIFICANT CHANGE UP (ref 80–100)
NRBC # BLD: 0 /100 WBCS — SIGNIFICANT CHANGE UP (ref 0–0)
PLATELET # BLD AUTO: 252 K/UL — SIGNIFICANT CHANGE UP (ref 150–400)
POTASSIUM SERPL-MCNC: 3.8 MMOL/L — SIGNIFICANT CHANGE UP (ref 3.5–5.3)
POTASSIUM SERPL-SCNC: 3.8 MMOL/L — SIGNIFICANT CHANGE UP (ref 3.5–5.3)
PROT SERPL-MCNC: 7.2 G/DL — SIGNIFICANT CHANGE UP (ref 6–8.3)
RBC # BLD: 4.73 M/UL — SIGNIFICANT CHANGE UP (ref 4.2–5.8)
RBC # FLD: 12.6 % — SIGNIFICANT CHANGE UP (ref 10.3–14.5)
SODIUM SERPL-SCNC: 133 MMOL/L — LOW (ref 135–145)
WBC # BLD: 4.15 K/UL — SIGNIFICANT CHANGE UP (ref 3.8–10.5)
WBC # FLD AUTO: 4.15 K/UL — SIGNIFICANT CHANGE UP (ref 3.8–10.5)

## 2020-03-28 PROCEDURE — 93010 ELECTROCARDIOGRAM REPORT: CPT

## 2020-03-28 PROCEDURE — 99233 SBSQ HOSP IP/OBS HIGH 50: CPT

## 2020-03-28 RX ORDER — ALBUTEROL 90 UG/1
1 AEROSOL, METERED ORAL ONCE
Refills: 0 | Status: COMPLETED | OUTPATIENT
Start: 2020-03-28 | End: 2020-03-28

## 2020-03-28 RX ADMIN — HEPARIN SODIUM 5000 UNIT(S): 5000 INJECTION INTRAVENOUS; SUBCUTANEOUS at 05:59

## 2020-03-28 RX ADMIN — ALBUTEROL 1 PUFF(S): 90 AEROSOL, METERED ORAL at 03:26

## 2020-03-28 RX ADMIN — Medication 200 MILLIGRAM(S): at 17:44

## 2020-03-28 RX ADMIN — HEPARIN SODIUM 5000 UNIT(S): 5000 INJECTION INTRAVENOUS; SUBCUTANEOUS at 22:22

## 2020-03-28 RX ADMIN — Medication 650 MILLIGRAM(S): at 09:17

## 2020-03-28 RX ADMIN — Medication 650 MILLIGRAM(S): at 00:00

## 2020-03-28 RX ADMIN — AZITHROMYCIN 250 MILLIGRAM(S): 500 TABLET, FILM COATED ORAL at 12:40

## 2020-03-28 RX ADMIN — HEPARIN SODIUM 5000 UNIT(S): 5000 INJECTION INTRAVENOUS; SUBCUTANEOUS at 14:18

## 2020-03-28 RX ADMIN — FAMOTIDINE 20 MILLIGRAM(S): 10 INJECTION INTRAVENOUS at 17:43

## 2020-03-28 RX ADMIN — FAMOTIDINE 20 MILLIGRAM(S): 10 INJECTION INTRAVENOUS at 05:59

## 2020-03-28 RX ADMIN — ONDANSETRON 4 MILLIGRAM(S): 8 TABLET, FILM COATED ORAL at 03:45

## 2020-03-28 RX ADMIN — Medication 200 MILLIGRAM(S): at 05:59

## 2020-03-28 RX ADMIN — Medication 650 MILLIGRAM(S): at 09:54

## 2020-03-28 NOTE — PROGRESS NOTE ADULT - PROBLEM SELECTOR PLAN 2
COVID 19 positive. Reviewed CXR- b/L infiltrate. Blood c/s neg, WBC wnl  - On Plaquenil and azithromycin  - Serulimab trial participant  - EKG showed QTc 459, repeat daily EKG COVID 19 positive. Reviewed CXR- b/L infiltrate. Blood c/s neg, WBC wnl  - On Plaquenil and azithromycin  - Serulimab trial participant  - EKG showed QTc 459, repeat daily EKG  - Discussed care with Dr. Rodriguez ID

## 2020-03-28 NOTE — PROGRESS NOTE ADULT - PROBLEM SELECTOR PLAN 1
- Hypoxic 2/2 COVID 19, O2 sat 93% on 5L today, Tmax 101.7F yesterday  - on hydroxychloroquine/azithro, and Sarulimab trial per ID  - c/w O2 5L via NC, if needed will use NRB  - Repeat ABG today  - Pulmonary, ID f/u plan noted

## 2020-03-28 NOTE — PROVIDER CONTACT NOTE (OTHER) - SITUATION
Patient is febrile with a temp of 101.7
Patient complains of feeling shortness of breath.
Temp 101.8
temp >100

## 2020-03-28 NOTE — PROVIDER CONTACT NOTE (OTHER) - DATE AND TIME:
25-Mar-2020 08:20
25-Mar-2020 16:14
26-Mar-2020 01:40
26-Mar-2020 23:07
28-Mar-2020 01:40
27-Mar-2020 23:00

## 2020-03-28 NOTE — PROGRESS NOTE ADULT - SUBJECTIVE AND OBJECTIVE BOX
Pulmonary Consult Note    CAROLANN RAMIRES  MRN-[id number]    Chief Complaint: Patient is a 44y old  Male who presents with a chief complaint of     HPI:  tired, sat 95% on 5L  stable      ROS:  neg      SOCIAL HISTORY: former smoker, quit 2015    MEDICATIONS  (STANDING):  azithromycin   Tablet 250 milliGRAM(s) Oral daily  famotidine    Tablet 20 milliGRAM(s) Oral two times a day  heparin  Injectable 5000 Unit(s) SubCutaneous every 8 hours  hydroxychloroquine   Oral   hydroxychloroquine 200 milliGRAM(s) Oral every 12 hours    MEDICATIONS  (PRN):  acetaminophen   Tablet .. 650 milliGRAM(s) Oral every 6 hours PRN Temp greater or equal to 38C (100.4F), Mild Pain (1 - 3), Moderate Pain (4 - 6)  diphenhydrAMINE   Injectable 50 milliGRAM(s) IV Push once PRN anaphylactic reaction to study drug  EPINEPHrine     1 mG/mL Injectable 0.3 milliGRAM(s) IntraMuscular once PRN anaphylactic reaction to study drug  gabapentin 300 milliGRAM(s) Oral three times a day PRN pain  ondansetron Injectable 4 milliGRAM(s) IV Push every 6 hours PRN Nausea and/or Vomiting  sodium chloride 0.65% Nasal 1 Spray(s) Both Nostrils every 3 hours PRN Nasal Congestion      EXAM:  Vital Signs Last 24 Hrs  T(C): 36.4 (28 Mar 2020 11:39), Max: 38.7 (27 Mar 2020 21:40)  T(F): 97.6 (28 Mar 2020 11:39), Max: 101.7 (27 Mar 2020 21:40)  HR: 80 (28 Mar 2020 11:39) (80 - 96)  BP: 101/68 (28 Mar 2020 11:39) (101/68 - 134/87)  BP(mean): --  RR: 20 (28 Mar 2020 11:39) (20 - 32)  SpO2: 93% (28 Mar 2020 11:39) (90% - 94%)  GENERAL: No acute distress  NEURO: Alert and oriented x 3  LUNGS: Clear to auscultation bilaterally, no rales or wheezes  CV: S1/S2  EXTREMITIES: no clubbing, cyanosis, edema    LABS/IMAGING: reviewed                                              13.6   4.15  )-----------( 252      ( 28 Mar 2020 09:52 )             40.3   03-28    133<L>  |  91<L>  |  11  ----------------------------<  88  3.8   |  30  |  1.00    Ca    9.2      28 Mar 2020 09:52  Phos  2.9     03-27    TPro  7.2  /  Alb  3.3  /  TBili  0.6  /  DBili  x   /  AST  39  /  ALT  38  /  AlkPhos  63  03-28      < from: Xray Chest 1 View AP/PA (03.24.20 @ 01:58) >    IMPRESSION: Scattered patchy opacities, likely representing multifocal infection (differential considerations include viral pneumonia, such as COVID 19).      < end of copied text >  < from: Xray Chest 1 View AP/PA (03.24.20 @ 01:58) >    IMPRESSION: Scattered patchy opacities, likely representing multifocal infection (differential considerations include viral pneumonia, such as COVID 19).      < end of copied text >      PROBLEM LIST:  44yMale with HEALTH ISSUES - PROBLEM Dx:  COVID positive        RECS:  -hydroxychloroquine/azithro, sarulimab trial per ID  -Monitor O2 sat  -supportive care  -incentiv damien        please feel free to call with any questions [phone]  Elvira Neil MD

## 2020-03-28 NOTE — PROGRESS NOTE ADULT - ASSESSMENT
45 yo male with PMHx Nephrolithiases and chronic lower back pain w/ sciatica presented to [facility] c/o fever, cough and worsening SOB, + COVID 19 admitted for hypoxemia.

## 2020-03-29 DIAGNOSIS — R51 HEADACHE: ICD-10-CM

## 2020-03-29 LAB
ANION GAP SERPL CALC-SCNC: 18 MMOL/L — HIGH (ref 5–17)
BUN SERPL-MCNC: 12 MG/DL — SIGNIFICANT CHANGE UP (ref 7–23)
CALCIUM SERPL-MCNC: 9.1 MG/DL — SIGNIFICANT CHANGE UP (ref 8.4–10.5)
CHLORIDE SERPL-SCNC: 91 MMOL/L — LOW (ref 96–108)
CO2 SERPL-SCNC: 21 MMOL/L — LOW (ref 22–31)
CREAT SERPL-MCNC: 0.7 MG/DL — SIGNIFICANT CHANGE UP (ref 0.5–1.3)
CRP SERPL-MCNC: 6.49 MG/DL — HIGH (ref 0–0.4)
CULTURE RESULTS: SIGNIFICANT CHANGE UP
CULTURE RESULTS: SIGNIFICANT CHANGE UP
GLUCOSE SERPL-MCNC: 80 MG/DL — SIGNIFICANT CHANGE UP (ref 70–99)
HCT VFR BLD CALC: 43.4 % — SIGNIFICANT CHANGE UP (ref 39–50)
HGB BLD-MCNC: 14.2 G/DL — SIGNIFICANT CHANGE UP (ref 13–17)
MCHC RBC-ENTMCNC: 27.8 PG — SIGNIFICANT CHANGE UP (ref 27–34)
MCHC RBC-ENTMCNC: 32.7 GM/DL — SIGNIFICANT CHANGE UP (ref 32–36)
MCV RBC AUTO: 85.1 FL — SIGNIFICANT CHANGE UP (ref 80–100)
NRBC # BLD: 0 /100 WBCS — SIGNIFICANT CHANGE UP (ref 0–0)
PLATELET # BLD AUTO: 244 K/UL — SIGNIFICANT CHANGE UP (ref 150–400)
POTASSIUM SERPL-MCNC: 3.9 MMOL/L — SIGNIFICANT CHANGE UP (ref 3.5–5.3)
POTASSIUM SERPL-SCNC: 3.9 MMOL/L — SIGNIFICANT CHANGE UP (ref 3.5–5.3)
RBC # BLD: 5.1 M/UL — SIGNIFICANT CHANGE UP (ref 4.2–5.8)
RBC # FLD: 12.5 % — SIGNIFICANT CHANGE UP (ref 10.3–14.5)
SODIUM SERPL-SCNC: 130 MMOL/L — LOW (ref 135–145)
SPECIMEN SOURCE: SIGNIFICANT CHANGE UP
SPECIMEN SOURCE: SIGNIFICANT CHANGE UP
WBC # BLD: 4.64 K/UL — SIGNIFICANT CHANGE UP (ref 3.8–10.5)
WBC # FLD AUTO: 4.64 K/UL — SIGNIFICANT CHANGE UP (ref 3.8–10.5)

## 2020-03-29 PROCEDURE — 99232 SBSQ HOSP IP/OBS MODERATE 35: CPT

## 2020-03-29 PROCEDURE — 93010 ELECTROCARDIOGRAM REPORT: CPT

## 2020-03-29 PROCEDURE — 99233 SBSQ HOSP IP/OBS HIGH 50: CPT

## 2020-03-29 RX ADMIN — HEPARIN SODIUM 5000 UNIT(S): 5000 INJECTION INTRAVENOUS; SUBCUTANEOUS at 21:13

## 2020-03-29 RX ADMIN — FAMOTIDINE 20 MILLIGRAM(S): 10 INJECTION INTRAVENOUS at 17:47

## 2020-03-29 RX ADMIN — Medication 200 MILLIGRAM(S): at 05:08

## 2020-03-29 RX ADMIN — HEPARIN SODIUM 5000 UNIT(S): 5000 INJECTION INTRAVENOUS; SUBCUTANEOUS at 05:07

## 2020-03-29 RX ADMIN — FAMOTIDINE 20 MILLIGRAM(S): 10 INJECTION INTRAVENOUS at 05:07

## 2020-03-29 NOTE — PROGRESS NOTE ADULT - SUBJECTIVE AND OBJECTIVE BOX
infectious diseases progress note:    Patient is a 44y old  Male who presents with a chief complaint of hypoxia COVID19+ (29 Mar 2020 10:56)        Shortness of breath           Allergies    No Known Allergies    Intolerances        ANTIBIOTICS/RELEVANT:  antimicrobials    immunologic:    OTHER:  acetaminophen   Tablet .. 650 milliGRAM(s) Oral every 6 hours PRN  diphenhydrAMINE   Injectable 50 milliGRAM(s) IV Push once PRN  EPINEPHrine     1 mG/mL Injectable 0.3 milliGRAM(s) IntraMuscular once PRN  famotidine    Tablet 20 milliGRAM(s) Oral two times a day  gabapentin 300 milliGRAM(s) Oral three times a day PRN  heparin  Injectable 5000 Unit(s) SubCutaneous every 8 hours  ondansetron Injectable 4 milliGRAM(s) IV Push every 6 hours PRN  sodium chloride 0.65% Nasal 1 Spray(s) Both Nostrils every 3 hours PRN      Objective:  Vital Signs Last 24 Hrs  T(C): 36.7 (29 Mar 2020 07:42), Max: 37.5 (28 Mar 2020 18:28)  T(F): 98.1 (29 Mar 2020 07:42), Max: 99.5 (28 Mar 2020 18:28)  HR: 85 (29 Mar 2020 07:42) (75 - 88)  BP: 103/70 (29 Mar 2020 07:42) (100/72 - 153/80)  BP(mean): --  RR: 20 (29 Mar 2020 07:42) (18 - 20)  SpO2: 92% (29 Mar 2020 07:42) (92% - 95%)       Eyes:LUCIA, EOMI  Ear/Nose/Throat: no oral lesion, no sinus tenderness on percussion	  Neck:no JVD, no lymphadenopathy, supple  Respiratory: CTA edith  Cardiovascular: S1S2 RRR, no murmurs  Gastrointestinal:soft, (+) BS, no HSM  Extremities:no e/e/c        LABS:                        14.2   4.64  )-----------( 244      ( 29 Mar 2020 11:44 )             43.4     03-29    130<L>  |  91<L>  |  12  ----------------------------<  80  3.9   |  21<L>  |  0.70    Ca    9.1      29 Mar 2020 11:44    TPro  7.2  /  Alb  3.3  /  TBili  0.6  /  DBili  x   /  AST  39  /  ALT  38  /  AlkPhos  63  03-28            MICROBIOLOGY:    RECENT CULTURES:  03-24 @ 04:07 .Blood Blood-Peripheral                No growth at 5 days.          RESPIRATORY CULTURES:              RADIOLOGY & ADDITIONAL STUDIES:        Pager [phone]  After 5 pm/weekends or if no response :[phone]

## 2020-03-29 NOTE — PROGRESS NOTE ADULT - SUBJECTIVE AND OBJECTIVE BOX
PULMONARY PROGRESS NOTE    CAROLANN RAMIRES  MRN-[id number]    Patient is a 44y old  Male who presents with a chief complaint of hypoxia COVID19+ (28 Mar 2020 13:07)      HPI:  -COVID pos  off hydroxychloroquine  still on zithromax  appetite poor  feels weak  not SOB-  -  -    ROS:   -SOB-breathing "catching" when falling asleep  -    ACTIVE MEDICATION LIST:  MEDICATIONS  (STANDING):  azithromycin   Tablet 250 milliGRAM(s) Oral daily  famotidine    Tablet 20 milliGRAM(s) Oral two times a day  heparin  Injectable 5000 Unit(s) SubCutaneous every 8 hours    MEDICATIONS  (PRN):  acetaminophen   Tablet .. 650 milliGRAM(s) Oral every 6 hours PRN Temp greater or equal to 38C (100.4F), Mild Pain (1 - 3), Moderate Pain (4 - 6)  diphenhydrAMINE   Injectable 50 milliGRAM(s) IV Push once PRN anaphylactic reaction to study drug  EPINEPHrine     1 mG/mL Injectable 0.3 milliGRAM(s) IntraMuscular once PRN anaphylactic reaction to study drug  gabapentin 300 milliGRAM(s) Oral three times a day PRN pain  ondansetron Injectable 4 milliGRAM(s) IV Push every 6 hours PRN Nausea and/or Vomiting  sodium chloride 0.65% Nasal 1 Spray(s) Both Nostrils every 3 hours PRN Nasal Congestion      EXAM:  Vital Signs Last 24 Hrs  T(C): 36.7 (29 Mar 2020 07:42), Max: 37.5 (28 Mar 2020 18:28)  T(F): 98.1 (29 Mar 2020 07:42), Max: 99.5 (28 Mar 2020 18:28)  HR: 85 (29 Mar 2020 07:42) (75 - 88)  BP: 103/70 (29 Mar 2020 07:42) (100/72 - 153/80)  BP(mean): --  RR: 20 (29 Mar 2020 07:42) (18 - 20)  SpO2: 92% (29 Mar 2020 07:42) (92% - 95%)    GENERAL: The patient is awake and alert in no apparent distress.     SKIN: Warm, dry, no rashes    LUNGS: Clear to auscultation without wheezing, rales or rhonchi; respirations unlabored    HEART: Regular rate and rhythm without murmur.    ABDOMEN: +BS, Soft, Nontender    EXTREMITIES: No clubbing, cyanosis, edema                              13.6   4.15  )-----------( 252      ( 28 Mar 2020 09:52 )             40.3       03-28    133<L>  |  91<L>  |  11  ----------------------------<  88  3.8   |  30  |  1.00    Ca    9.2      28 Mar 2020 09:52  Phos  2.9     03-27    TPro  7.2  /  Alb  3.3  /  TBili  0.6  /  DBili  x   /  AST  39  /  ALT  38  /  AlkPhos  63  03-28      LIVER FUNCTIONS - ( 28 Mar 2020 09:52 )  Alb: 3.3 g/dL / Pro: 7.2 g/dL / ALK PHOS: 63 U/L / ALT: 38 U/L / AST: 39 U/L / GGT: x                   PROBLEM LIST:  44y Male with HEALTH ISSUES - PROBLEM Dx:  Coronavirus infection: Coronavirus infection  Acute hypoxemic respiratory failure: Acute hypoxemic respiratory failure  Hypokalemia: Hypokalemia  Discharge planning issues: Discharge planning issues  Chronic back pain: Chronic back pain  Kidney stones: Kidney stones  Viral pneumonia: Viral pneumonia        RECS:  Possible underlying LAZARO  consider opt sleep study    consider stopping zithromax      Chung Beltre MD  [phone]

## 2020-03-29 NOTE — PROGRESS NOTE ADULT - PROBLEM SELECTOR PLAN 2
COVID 19 positive. Reviewed CXR- b/L infiltrate. Blood c/s neg, WBC wnl  - On Plaquenil and azithromycin  - Serulimab trial participant  - EKG showed QTc 459, repeat daily EKG  - Discussed care with ID

## 2020-03-29 NOTE — PROGRESS NOTE ADULT - ASSESSMENT
clinically stable   still febrile   no signs of inttraabd pathology eating small amounts of food    sp il 6 inhibitor    oxygen sats very good on nasal cannula  temps lower   continue present therapy  complete sp 5 days of hydroxychloroquine   off of Zithromax  continue  hopefully home on oxygen this week

## 2020-03-29 NOTE — PROGRESS NOTE ADULT - SUBJECTIVE AND OBJECTIVE BOX
Dax Johnson MD  Division of Hospital Medicine  Pager: [phone]  If no response or off-hours, page [phone]  -------------------------------------    Patient is a 44y old  Male who presents with a chief complaint of hypoxia COVID19+ (29 Mar 2020 12:07)      SUBJECTIVE / OVERNIGHT EVENTS: none acute  ADDITIONAL REVIEW OF SYSTEMS: pt reports 'hair pain' whenever he runs his hands through his hair. also notes R sided cluster headache. Thinks that he has psychosomatic symptoms and anxiety. Does not want to take any anxiolytics at this time.    MEDICATIONS  (STANDING):  famotidine    Tablet 20 milliGRAM(s) Oral two times a day  heparin  Injectable 5000 Unit(s) SubCutaneous every 8 hours    MEDICATIONS  (PRN):  acetaminophen   Tablet .. 650 milliGRAM(s) Oral every 6 hours PRN Temp greater or equal to 38C (100.4F), Mild Pain (1 - 3), Moderate Pain (4 - 6)  diphenhydrAMINE   Injectable 50 milliGRAM(s) IV Push once PRN anaphylactic reaction to study drug  EPINEPHrine     1 mG/mL Injectable 0.3 milliGRAM(s) IntraMuscular once PRN anaphylactic reaction to study drug  gabapentin 300 milliGRAM(s) Oral three times a day PRN pain  ondansetron Injectable 4 milliGRAM(s) IV Push every 6 hours PRN Nausea and/or Vomiting  sodium chloride 0.65% Nasal 1 Spray(s) Both Nostrils every 3 hours PRN Nasal Congestion      CAPILLARY BLOOD GLUCOSE        I&O's Summary    28 Mar 2020 07:01  -  29 Mar 2020 07:00  --------------------------------------------------------  IN: 200 mL / OUT: 1200 mL / NET: -1000 mL        PHYSICAL EXAM:  Vital Signs Last 24 Hrs  T(C): 36.8 (29 Mar 2020 13:02), Max: 37.5 (28 Mar 2020 18:28)  T(F): 98.3 (29 Mar 2020 13:02), Max: 99.5 (28 Mar 2020 18:28)  HR: 83 (29 Mar 2020 13:02) (75 - 88)  BP: 109/73 (29 Mar 2020 13:02) (100/72 - 153/80)  BP(mean): --  RR: 18 (29 Mar 2020 13:02) (18 - 20)  SpO2: 93% (29 Mar 2020 13:02) (92% - 95%)  CONSTITUTIONAL: NAD, well-developed, well-groomed  EYES: PERRLA; conjunctiva and sclera clear  ENMT: Moist oral mucosa, no pharyngeal injection or exudates; normal dentition  NECK: Supple, no palpable masses; no thyromegaly  RESPIRATORY: normal effort, not tachypneic, no accessory muscle usage  CARDIOVASCULAR: Regular rate and rhythm, normal S1 and S2, no murmur/rub/gallop; No lower extremity edema; Peripheral pulses are 2+ bilaterally  ABDOMEN: Nontender to palpation, normoactive bowel sounds, no rebound/guarding; No hepatosplenomegaly  MUSCLOSKELETAL:  Normal gait; no clubbing or cyanosis of digits; no joint swelling or tenderness to palpation  PSYCH: A+O to person, place, and time; affect appropriate  NEUROLOGY: CN 2-12 are intact and symmetric; no gross sensory deficits;   SKIN: No rashes; no palpable lesions    LABS:                        14.2   4.64  )-----------( 244      ( 29 Mar 2020 11:44 )             43.4     03-29    130<L>  |  91<L>  |  12  ----------------------------<  80  3.9   |  21<L>  |  0.70    Ca    9.1      29 Mar 2020 11:44    TPro  7.2  /  Alb  3.3  /  TBili  0.6  /  DBili  x   /  AST  39  /  ALT  38  /  AlkPhos  63  03-28                RADIOLOGY & ADDITIONAL TESTS:  Results Reviewed:   Imaging Personally Reviewed:  Electrocardiogram Personally Reviewed:    COORDINATION OF CARE:  Care Discussed with Consultants/Other Providers [Y/N]: ID  Prior or Outpatient Records Reviewed [Y/N]:

## 2020-03-29 NOTE — PROGRESS NOTE ADULT - PROBLEM SELECTOR PLAN 4
Suspect cluster headache likely 2/2 acute stress vs. adverse reaction to possible IL6 agent vs. manifestation of viral illness vs. psychosomatic  - pt doesn't want to add any other medicaitons at this time, continue monitoring and can use prn tylenol

## 2020-03-29 NOTE — PROGRESS NOTE ADULT - PROBLEM SELECTOR PLAN 1
- Hypoxic 2/2 COVID 19, O2 sat 93% on 5L today,   - on hydroxychloroquine/azithro, and Sarulimab trial per ID  - c/w O2 5L via NC, if needed will use NRB  - Pulmonary, ID f/u plan noted

## 2020-03-29 NOTE — PROGRESS NOTE ADULT - ASSESSMENT
45 yo male with PMHx Nephrolithiases and chronic lower back pain w/ sciatica presented to [facility] c/o fever, cough and worsening SOB, + COVID 19 admitted for hypoxemia.

## 2020-03-30 PROCEDURE — 99232 SBSQ HOSP IP/OBS MODERATE 35: CPT

## 2020-03-30 RX ADMIN — FAMOTIDINE 20 MILLIGRAM(S): 10 INJECTION INTRAVENOUS at 05:52

## 2020-03-30 RX ADMIN — FAMOTIDINE 20 MILLIGRAM(S): 10 INJECTION INTRAVENOUS at 14:32

## 2020-03-30 RX ADMIN — HEPARIN SODIUM 5000 UNIT(S): 5000 INJECTION INTRAVENOUS; SUBCUTANEOUS at 20:45

## 2020-03-30 RX ADMIN — HEPARIN SODIUM 5000 UNIT(S): 5000 INJECTION INTRAVENOUS; SUBCUTANEOUS at 05:51

## 2020-03-30 RX ADMIN — HEPARIN SODIUM 5000 UNIT(S): 5000 INJECTION INTRAVENOUS; SUBCUTANEOUS at 14:32

## 2020-03-30 NOTE — PROGRESS NOTE ADULT - PROBLEM SELECTOR PLAN 2
COVID 19 positive. Reviewed CXR- b/L infiltrate. Blood c/s neg, WBC wnl  -completed Plaquenil and azithromycin  - Serulimab trial participant  - EKG showed QTc 459  - ID is aware

## 2020-03-30 NOTE — PROGRESS NOTE ADULT - SUBJECTIVE AND OBJECTIVE BOX
Mohsin Khan, MD  Attending Physician, Division Of Hospital Medicine  Pager: [phone], Office: [phone]  Off hour pager: [phone]    Patient is a 44y old  Male who presents with a chief complaint of hypoxia COVID19+     SUBJECTIVE / OVERNIGHT EVENTS:  Seen, examined the patient in am  Sitting in bed, remains afebrile, Tmax 99.3F, O2 sat 93% in 3-5L NC.  Says he feels better since admission      MEDICATIONS  (STANDING):  famotidine    Tablet 20 milliGRAM(s) Oral two times a day  heparin  Injectable 5000 Unit(s) SubCutaneous every 8 hours    MEDICATIONS  (PRN):  acetaminophen   Tablet .. 650 milliGRAM(s) Oral every 6 hours PRN Temp greater or equal to 38C (100.4F), Mild Pain (1 - 3), Moderate Pain (4 - 6)  diphenhydrAMINE   Injectable 50 milliGRAM(s) IV Push once PRN anaphylactic reaction to study drug  EPINEPHrine     1 mG/mL Injectable 0.3 milliGRAM(s) IntraMuscular once PRN anaphylactic reaction to study drug  gabapentin 300 milliGRAM(s) Oral three times a day PRN pain  ondansetron Injectable 4 milliGRAM(s) IV Push every 6 hours PRN Nausea and/or Vomiting  sodium chloride 0.65% Nasal 1 Spray(s) Both Nostrils every 3 hours PRN Nasal Congestion      Vital Signs Last 24 Hrs  T(C): 35.6 (30 Mar 2020 09:01), Max: 37.4 (29 Mar 2020 21:55)  T(F): 96 (30 Mar 2020 09:01), Max: 99.3 (29 Mar 2020 21:55)  HR: 82 (30 Mar 2020 09:01) (82 - 98)  BP: 107/72 (30 Mar 2020 09:01) (107/72 - 122/88)  BP(mean): --  RR: 18 (30 Mar 2020 09:01) (18 - 18)  SpO2: 93% (30 Mar 2020 09:01) (92% - 93%)  CAPILLARY BLOOD GLUCOSE        I&O's Summary    29 Mar 2020 07:01  -  30 Mar 2020 07:00  --------------------------------------------------------  IN: 150 mL / OUT: 400 mL / NET: -250 mL        PHYSICAL EXAM:-  GENERAL: NAD, well-developed  EYES: EOMI, PERRLA, conjunctiva and sclera clear  NECK: Supple, No JVD, no thyromegaly  CHEST/LUNG: Clear to auscultation bilaterally; No wheeze  HEART: Regular rate and rhythm; S1, S2 audible, No murmurs, rubs, or gallops  ABDOMEN: Soft, Nontender, Nondistended; Bowel sounds present  EXTREMITIES:  2+ Peripheral Pulses, No clubbing, cyanosis, or edema  NEURO: AAOx3, no focal deficit      LABS:                        14.2   4.64  )-----------( 244      ( 29 Mar 2020 11:44 )             43.4     03-29    130<L>  |  91<L>  |  12  ----------------------------<  80  3.9   |  21<L>  |  0.70    Ca    9.1      29 Mar 2020 11:44        RADIOLOGY & ADDITIONAL TESTS:    Imaging Personally Reviewed: CXR  Consultant(s) Notes Reviewed: ID, Pulmonary   Care Discussed with Consultants/Other Providers: ID, Pulmonary

## 2020-03-30 NOTE — PROGRESS NOTE ADULT - SUBJECTIVE AND OBJECTIVE BOX
Pulmonary Consult Note    CAROLANN RAMIRES  MRN-[id number]    Chief Complaint: Patient is a 44y old  Male who presents with a chief complaint of     HPI:  wants to go home  sat 92-3% on 3L nasal canula  nose very dry    ROS:  neg      SOCIAL HISTORY: former smoker, quit 2015    MEDICATIONS  (STANDING):  famotidine    Tablet 20 milliGRAM(s) Oral two times a day  heparin  Injectable 5000 Unit(s) SubCutaneous every 8 hours    MEDICATIONS  (PRN):  acetaminophen   Tablet .. 650 milliGRAM(s) Oral every 6 hours PRN Temp greater or equal to 38C (100.4F), Mild Pain (1 - 3), Moderate Pain (4 - 6)  diphenhydrAMINE   Injectable 50 milliGRAM(s) IV Push once PRN anaphylactic reaction to study drug  EPINEPHrine     1 mG/mL Injectable 0.3 milliGRAM(s) IntraMuscular once PRN anaphylactic reaction to study drug  gabapentin 300 milliGRAM(s) Oral three times a day PRN pain  ondansetron Injectable 4 milliGRAM(s) IV Push every 6 hours PRN Nausea and/or Vomiting  sodium chloride 0.65% Nasal 1 Spray(s) Both Nostrils every 3 hours PRN Nasal Congestion        EXAM:  Vital Signs Last 24 Hrs  T(C): 35.6 (30 Mar 2020 09:01), Max: 37.4 (29 Mar 2020 21:55)  T(F): 96 (30 Mar 2020 09:01), Max: 99.3 (29 Mar 2020 21:55)  HR: 82 (30 Mar 2020 09:01) (82 - 98)  BP: 107/72 (30 Mar 2020 09:01) (107/72 - 122/88)  BP(mean): --  RR: 18 (30 Mar 2020 09:01) (18 - 18)  SpO2: 93% (30 Mar 2020 09:01) (92% - 93%)  GENERAL: No acute distress  NEURO: Alert and oriented x 3  LUNGS: Clear to auscultation bilaterally, no rales or wheezes  CV: S1/S2  EXTREMITIES: no clubbing, cyanosis, edema    LABS/IMAGING: reviewed                                              14.2   4.64  )-----------( 244      ( 29 Mar 2020 11:44 )             43.4   03-29    130<L>  |  91<L>  |  12  ----------------------------<  80  3.9   |  21<L>  |  0.70    Ca    9.1      29 Mar 2020 11:44        < from: Xray Chest 1 View AP/PA (03.24.20 @ 01:58) >    IMPRESSION: Scattered patchy opacities, likely representing multifocal infection (differential considerations include viral pneumonia, such as COVID 19).      < end of copied text >  < from: Xray Chest 1 View AP/PA (03.24.20 @ 01:58) >    IMPRESSION: Scattered patchy opacities, likely representing multifocal infection (differential considerations include viral pneumonia, such as COVID 19).      < end of copied text >      PROBLEM LIST:  44yMale with HEALTH ISSUES - PROBLEM Dx:  COVID positive        RECS:  -s/p hydroxychloroquine/azithro, sarulimab trial per ID  -Monitor O2 sat  -supportive care  -incentive damien  -dc planning once O2 requirements decrease        please feel free to call with any questions [phone]  Elvira Neil MD

## 2020-03-30 NOTE — PROGRESS NOTE ADULT - PROBLEM SELECTOR PLAN 1
Still hypoxic 2/2 COVID 19, O2 sat 93% on 3-5L today, Tmax 99.3F  - completed hydroxychloroquine/azithro, and on Sarulimab trial per ID  - Pulmonary, ID f/u plan noted

## 2020-03-30 NOTE — PROGRESS NOTE ADULT - ASSESSMENT
43 yo male with PMHx Nephrolithiases and chronic lower back pain w/ sciatica presented to [facility] c/o fever, cough and worsening SOB, + COVID 19 admitted for hypoxemia.

## 2020-03-30 NOTE — DIETITIAN INITIAL EVALUATION ADULT. - PHYSICAL APPEARANCE
other (specify) Height: 68 inches, Weight: 240 pounds (dosing)  BMI: 36.5 kg/m2 IBW: 154 pounds (+/-10%), %IBW: 145%  Pertinent Info: No edema noted, no pressure injuries noted at this time in nursing flow sheet.

## 2020-03-30 NOTE — DIETITIAN INITIAL EVALUATION ADULT. - ADD RECOMMEND
1. Consider liberalizing diet to regular given no cardiac history noted per chart to indicate need for DASH/TLC therapeutic diet. Kosher dietary preference noted. 2. Will continue to monitor nutrient intake, wt, labs, f/u per protocol

## 2020-03-30 NOTE — DIETITIAN INITIAL EVALUATION ADULT. - OTHER INFO
Per chart, pt is "43 yo male with PMHx Nephrolithiases and chronic lower back pain w/ sciatica presented to [facility] c/o fever, cough and worsening SOB, + COVID 19 admitted for hypoxemia."    Limited subjective information available.  Therapeutic Diet PTA: unable to obtain. NKFA noted in chart.  Nutrition Supplements PTA: unable to obtain    Pt UBW: unable to obtain. Pt dosing wt noted as 240 lbs.    Pt noted with decreased po intake PTA that had been improved upon admission per H&P, however unclear how po intake has been most recently in-patient.   No chewing/swallowing difficulties noted in chart. No acute GI distress noted in chart at this time, pt had c/o nausea and vomiting, as well as diarrhea upon admission per H&P. No BM noted since admission?     RD remains available.

## 2020-03-30 NOTE — DIETITIAN INITIAL EVALUATION ADULT. - PERTINENT MEDS FT
MEDICATIONS  (STANDING):  famotidine    Tablet 20 milliGRAM(s) Oral two times a day  heparin  Injectable 5000 Unit(s) SubCutaneous every 8 hours    MEDICATIONS  (PRN):  acetaminophen   Tablet .. 650 milliGRAM(s) Oral every 6 hours PRN Temp greater or equal to 38C (100.4F), Mild Pain (1 - 3), Moderate Pain (4 - 6)  diphenhydrAMINE   Injectable 50 milliGRAM(s) IV Push once PRN anaphylactic reaction to study drug  EPINEPHrine     1 mG/mL Injectable 0.3 milliGRAM(s) IntraMuscular once PRN anaphylactic reaction to study drug  gabapentin 300 milliGRAM(s) Oral three times a day PRN pain  ondansetron Injectable 4 milliGRAM(s) IV Push every 6 hours PRN Nausea and/or Vomiting  sodium chloride 0.65% Nasal 1 Spray(s) Both Nostrils every 3 hours PRN Nasal Congestion

## 2020-03-30 NOTE — DIETITIAN INITIAL EVALUATION ADULT. - PROBLEM SELECTOR PLAN 1
Fever and cough at home, found to be COVID+, CXR patchy opacities, Currently satting well 3L, not tachypneic.   - c/w Plaquenil Qtc wnl  - monitor off antibiotics  - airborne/contact precautions  - check CRP, LDH, ferritin  - avoid IVFs and NSAIDs given COVID19+  - encourage PO hydration, no longer having diarrhea

## 2020-03-30 NOTE — DIETITIAN INITIAL EVALUATION ADULT. - REASON INDICATOR FOR ASSESSMENT
Initial RD assessment. Unable to conduct face-to-face interview secondary to restricted isolation precautions with pt medical condition. RD interview attempted via phone at this time multiple times, however pt does not answer. Attempted to call pt contact in [place] (Dav[place] [phone]), however person who picked up stated number is wrong number.  Information obtained from electronic medical record at this time

## 2020-03-31 ENCOUNTER — TRANSCRIPTION ENCOUNTER (OUTPATIENT)
Age: 45
End: 2020-03-31

## 2020-03-31 VITALS — OXYGEN SATURATION: 93 %

## 2020-03-31 LAB
ALBUMIN SERPL ELPH-MCNC: 3.6 G/DL — SIGNIFICANT CHANGE UP (ref 3.3–5)
ALP SERPL-CCNC: 83 U/L — SIGNIFICANT CHANGE UP (ref 40–120)
ALT FLD-CCNC: 119 U/L — HIGH (ref 10–45)
ANION GAP SERPL CALC-SCNC: 14 MMOL/L — SIGNIFICANT CHANGE UP (ref 5–17)
AST SERPL-CCNC: 99 U/L — HIGH (ref 10–40)
BASOPHILS # BLD AUTO: 0 K/UL — SIGNIFICANT CHANGE UP (ref 0–0.2)
BASOPHILS NFR BLD AUTO: 0 % — SIGNIFICANT CHANGE UP (ref 0–2)
BILIRUB SERPL-MCNC: 1 MG/DL — SIGNIFICANT CHANGE UP (ref 0.2–1.2)
BUN SERPL-MCNC: 16 MG/DL — SIGNIFICANT CHANGE UP (ref 7–23)
CALCIUM SERPL-MCNC: 9.5 MG/DL — SIGNIFICANT CHANGE UP (ref 8.4–10.5)
CHLORIDE SERPL-SCNC: 92 MMOL/L — LOW (ref 96–108)
CO2 SERPL-SCNC: 30 MMOL/L — SIGNIFICANT CHANGE UP (ref 22–31)
CREAT SERPL-MCNC: 0.93 MG/DL — SIGNIFICANT CHANGE UP (ref 0.5–1.3)
CRP SERPL-MCNC: 2.36 MG/DL — HIGH (ref 0–0.4)
EOSINOPHIL # BLD AUTO: 0 K/UL — SIGNIFICANT CHANGE UP (ref 0–0.5)
EOSINOPHIL NFR BLD AUTO: 0 % — SIGNIFICANT CHANGE UP (ref 0–6)
GLUCOSE SERPL-MCNC: 90 MG/DL — SIGNIFICANT CHANGE UP (ref 70–99)
HCT VFR BLD CALC: 43.4 % — SIGNIFICANT CHANGE UP (ref 39–50)
HGB BLD-MCNC: 14.4 G/DL — SIGNIFICANT CHANGE UP (ref 13–17)
LYMPHOCYTES # BLD AUTO: 1.02 K/UL — SIGNIFICANT CHANGE UP (ref 1–3.3)
LYMPHOCYTES # BLD AUTO: 19.1 % — SIGNIFICANT CHANGE UP (ref 13–44)
MAGNESIUM SERPL-MCNC: 2.6 MG/DL — SIGNIFICANT CHANGE UP (ref 1.6–2.6)
MANUAL SMEAR VERIFICATION: SIGNIFICANT CHANGE UP
MCHC RBC-ENTMCNC: 28.4 PG — SIGNIFICANT CHANGE UP (ref 27–34)
MCHC RBC-ENTMCNC: 33.2 GM/DL — SIGNIFICANT CHANGE UP (ref 32–36)
MCV RBC AUTO: 85.6 FL — SIGNIFICANT CHANGE UP (ref 80–100)
MONOCYTES # BLD AUTO: 0.46 K/UL — SIGNIFICANT CHANGE UP (ref 0–0.9)
MONOCYTES NFR BLD AUTO: 8.7 % — SIGNIFICANT CHANGE UP (ref 2–14)
NEUTROPHILS # BLD AUTO: 3.86 K/UL — SIGNIFICANT CHANGE UP (ref 1.8–7.4)
NEUTROPHILS NFR BLD AUTO: 71.3 % — SIGNIFICANT CHANGE UP (ref 43–77)
NEUTS BAND # BLD: 0.9 % — SIGNIFICANT CHANGE UP (ref 0–8)
PHOSPHATE SERPL-MCNC: 2.9 MG/DL — SIGNIFICANT CHANGE UP (ref 2.5–4.5)
PLAT MORPH BLD: NORMAL — SIGNIFICANT CHANGE UP
PLATELET # BLD AUTO: 410 K/UL — HIGH (ref 150–400)
POTASSIUM SERPL-MCNC: 3.7 MMOL/L — SIGNIFICANT CHANGE UP (ref 3.5–5.3)
POTASSIUM SERPL-SCNC: 3.7 MMOL/L — SIGNIFICANT CHANGE UP (ref 3.5–5.3)
PROT SERPL-MCNC: 7.6 G/DL — SIGNIFICANT CHANGE UP (ref 6–8.3)
RBC # BLD: 5.07 M/UL — SIGNIFICANT CHANGE UP (ref 4.2–5.8)
RBC # FLD: 12.4 % — SIGNIFICANT CHANGE UP (ref 10.3–14.5)
RBC BLD AUTO: SIGNIFICANT CHANGE UP
SODIUM SERPL-SCNC: 136 MMOL/L — SIGNIFICANT CHANGE UP (ref 135–145)
WBC # BLD: 5.34 K/UL — SIGNIFICANT CHANGE UP (ref 3.8–10.5)
WBC # FLD AUTO: 5.34 K/UL — SIGNIFICANT CHANGE UP (ref 3.8–10.5)

## 2020-03-31 PROCEDURE — 93010 ELECTROCARDIOGRAM REPORT: CPT

## 2020-03-31 PROCEDURE — 99232 SBSQ HOSP IP/OBS MODERATE 35: CPT

## 2020-03-31 PROCEDURE — 99239 HOSP IP/OBS DSCHRG MGMT >30: CPT

## 2020-03-31 RX ORDER — ACETAMINOPHEN 500 MG
2 TABLET ORAL
Qty: 0 | Refills: 0 | DISCHARGE
Start: 2020-03-31

## 2020-03-31 RX ADMIN — HEPARIN SODIUM 5000 UNIT(S): 5000 INJECTION INTRAVENOUS; SUBCUTANEOUS at 06:06

## 2020-03-31 RX ADMIN — HEPARIN SODIUM 5000 UNIT(S): 5000 INJECTION INTRAVENOUS; SUBCUTANEOUS at 13:42

## 2020-03-31 RX ADMIN — FAMOTIDINE 20 MILLIGRAM(S): 10 INJECTION INTRAVENOUS at 06:06

## 2020-03-31 NOTE — PROGRESS NOTE ADULT - PROBLEM SELECTOR PLAN 2
COVID 19 positive. Reviewed CXR- b/L infiltrate. Blood c/s neg, WBC wnl  -completed Plaquenil and azithromycin  - Serulimab trial participant  - EKG showed QTc 459

## 2020-03-31 NOTE — PROGRESS NOTE ADULT - ASSESSMENT
43 yo male with PMHx Nephrolithiases and chronic lower back pain w/ sciatica presented to [facility] c/o fever, cough and worsening SOB, + COVID 19 admitted for hypoxemia. Was on Trial IL 6

## 2020-03-31 NOTE — PROGRESS NOTE ADULT - PROBLEM SELECTOR PLAN 1
O2 sat 93% in RA. Feels better, afebrile, Breathing improved. Wants to go home. pulmonary agrees to d/c also  - completed hydroxychloroquine/azithro, and on Sarulimab trial per ID  - d/c home today, spoke to his brother- Dr Villarreal

## 2020-03-31 NOTE — DISCHARGE NOTE PROVIDER - NSDCCPCAREPLAN_GEN_ALL_CORE_FT
PRINCIPAL DISCHARGE DIAGNOSIS  Diagnosis: Infection due to 2019 novel coronavirus  Assessment and Plan of Treatment: You tested positive for COVID 19.  You no longer require hospitalization.  Please restrict activities outside of your home except for getting medical care.  Do not go to work, school, or public areas.  Avoid using public transportation, ride-sharing, or taxis.  Separate yourself from other people and animals in your home.  Call ahead before visiting your doctor.  Wear a facemask when you are around other people. Cover your cough and sneezes.  Clean your hands often.  Avoid sharing personal household items.  Clean all frequently touched surfaces daily.  Return to emergency department if you develop shortness of breath or persistent fevers greater than 103F        SECONDARY DISCHARGE DIAGNOSES  Diagnosis: Pneumonia  Assessment and Plan of Treatment:

## 2020-03-31 NOTE — PROGRESS NOTE ADULT - SUBJECTIVE AND OBJECTIVE BOX
PULMONARY PROGRESS NOTE    CAROLANN RAMIRES  MRN-[id number]    Patient is a 44y old  Male who presents with a chief complaint of hypoxia COVID19+ (30 Mar 2020 12:46)      HPI: COVID pneumonia  feels better  less SOB  some GERD-started on h2-  -  -  ROS: as above  -    ACTIVE MEDICATION LIST:  MEDICATIONS  (STANDING):  famotidine    Tablet 20 milliGRAM(s) Oral two times a day  heparin  Injectable 5000 Unit(s) SubCutaneous every 8 hours    MEDICATIONS  (PRN):  acetaminophen   Tablet .. 650 milliGRAM(s) Oral every 6 hours PRN Temp greater or equal to 38C (100.4F), Mild Pain (1 - 3), Moderate Pain (4 - 6)  diphenhydrAMINE   Injectable 50 milliGRAM(s) IV Push once PRN anaphylactic reaction to study drug  EPINEPHrine     1 mG/mL Injectable 0.3 milliGRAM(s) IntraMuscular once PRN anaphylactic reaction to study drug  gabapentin 300 milliGRAM(s) Oral three times a day PRN pain  ondansetron Injectable 4 milliGRAM(s) IV Push every 6 hours PRN Nausea and/or Vomiting  sodium chloride 0.65% Nasal 1 Spray(s) Both Nostrils every 3 hours PRN Nasal Congestion      EXAM:  Vital Signs Last 24 Hrs  T(C): 36.6 (31 Mar 2020 08:09), Max: 36.8 (31 Mar 2020 06:15)  T(F): 97.9 (31 Mar 2020 08:09), Max: 98.2 (31 Mar 2020 06:15)  HR: 86 (31 Mar 2020 08:09) (86 - 88)  BP: 111/79 (31 Mar 2020 08:09) (109/76 - 112/78)  BP(mean): --  RR: 18 (31 Mar 2020 08:09) (18 - 18)  SpO2: 93% (31 Mar 2020 08:09) (93% - 94%)    GENERAL: The patient is awake and alert in no apparent distress.     SKIN: Warm, dry, no rashes    LUNGS: Clear to auscultation without wheezing, rales or rhonchi; respirations unlabored    HEART: Regular rate and rhythm without murmur.    ABDOMEN: +BS, Soft, Nontender    EXTREMITIES: No clubbing, cyanosis, edema                              14.2   4.64  )-----------( 244      ( 29 Mar 2020 11:44 )             43.4       03-29    130<L>  |  91<L>  |  12  ----------------------------<  80  3.9   |  21<L>  |  0.70    Ca    9.1      29 Mar 2020 11:44        PROBLEM LIST:  44y Male with HEALTH ISSUES - PROBLEM Dx:  Headache: Headache  Coronavirus infection: Coronavirus infection  Acute hypoxemic respiratory failure: Acute hypoxemic respiratory failure  Hypokalemia: Hypokalemia  Discharge planning issues: Discharge planning issues  Chronic back pain: Chronic back pain  Kidney stones: Kidney stones  Viral pneumonia: Viral pneumonia      RECS: Overall better  may be able to go home-did drop sodium-would recheck before disc  check sat on room air  cont h2        Chung Beltre MD  [phone]

## 2020-03-31 NOTE — PROGRESS NOTE ADULT - REASON FOR ADMISSION
hypoxia COVID19+

## 2020-03-31 NOTE — PROGRESS NOTE ADULT - ATTENDING COMMENTS
Spoke to his Brother- Dr Villarreal about the plan of care  d/c time- 37 min
His Brother- Dr Villarreal ( Card) is aware of the plan
Brother - Dr Villarreal is aware
Patient brother- Dr Villarreal is aware of the plan
spoke to Dr Villarreal

## 2020-03-31 NOTE — DISCHARGE NOTE PROVIDER - NSDCMRMEDTOKEN_GEN_ALL_CORE_FT
cyclobenzaprine 10 mg oral tablet: 10 milligram(s) orally once a day  gabapentin 300 mg oral tablet: 300 milligram(s) orally 3 times a day, As Needed  potassium citrate 10 mEq oral tablet, extended release: 1 tab(s) orally 3 times a day acetaminophen 325 mg oral tablet: 2 tab(s) orally every 6 hours, As needed, Temp greater or equal to 38C (100.4F), Mild Pain (1 - 3), Moderate Pain (4 - 6)  cyclobenzaprine 10 mg oral tablet: 10 milligram(s) orally once a day  gabapentin 300 mg oral tablet: 300 milligram(s) orally 3 times a day, As Needed  potassium citrate 10 mEq oral tablet, extended release: 1 tab(s) orally 3 times a day

## 2020-03-31 NOTE — DISCHARGE NOTE PROVIDER - CARE PROVIDER_API CALL
Reyes, Elsa J (MD)  Internal Medicine  [address] [place] [address]  Phone: [phone]  Fax: [phone]  Follow Up Time:

## 2020-03-31 NOTE — DISCHARGE NOTE PROVIDER - HOSPITAL COURSE
43 yo male with PMHx Nephrolithiases and chronic lower back pain w/ sciatica presented to [facility] c/o fever, cough and worsening SOB, + COVID 19 admitted for hypoxemia.          Problem/Plan - 1:    ·  Problem: Acute hypoxemic respiratory failure 2/2 COVID 19, O2 sat adequate on room air on discharge     - completed hydroxychloroquine/azithro, and on Sarulimab trial per ID    - Pulmonary, ID f/u plan noted.          Problem/Plan - 2:    ·  Problem: Coronavirus infection.  Plan: COVID 19 positive. Reviewed CXR- b/L infiltrate. Blood c/s neg, WBC wnl    -completed Plaquenil and azithromycin    - Serulimab trial participant    - EKG showed QTc 459    - ID followed          Problem/Plan - 3:    ·  Problem: Kidney stones.  Plan: - Continue with home potassium citrate 10 meq TID.          Problem/Plan - 4:    ·  Problem: Headache.  Plan: Suspect cluster headache likely 2/2 acute stress vs. adverse reaction to possible IL6 agent vs. manifestation of viral illness vs. psychosomatic    - pt doesn't want to add any other medications at this time, continue monitoring and can use prn tylenol.          Problem/Plan - 5:    ·  Problem: Chronic back pain.  Plan: - hold off on NSAIDs    - c/w gabapentin PRN.         DCP with medication reconciliation discussed with Dr. Wagner.     Patient cleared for discharge home without skilled needs.     Follow up with PCP in 1-2 weeks. This is a 43 yo male with PMHx nephrolithiases and chronic lower back pain w/ sciatica presented to [facility] c/o fever, cough and worsening SOB, + COVID 19 admitted for acute hypoxic respiratory failure. Plan of care is outline as below during his stay-          Problem/Plan - 1:    ·  Problem: Acute hypoxemic respiratory failure 2/2 COVID 19, O2 sat adequate on room air on discharge     - completed hydroxychloroquine/azithro, and on Sarulimab trial per ID    - Pulmonary, ID evaluated. O2 sat 93% in RA          Problem/Plan - 2:    ·  Problem: Coronavirus infection.  Plan: COVID 19 positive. Reviewed CXR- b/L infiltrate. Blood c/s neg, WBC wnl    -completed Plaquenil and azithromycin    - Serulimab trial participant as above    - EKG showed QTc 459        The patient remained stable. feels lot better. His brother- Dr Villarreal ( Cardiologist) is also aware of his status. He is discharged home with instructions per guideline today.

## 2020-03-31 NOTE — DISCHARGE NOTE NURSING/CASE MANAGEMENT/SOCIAL WORK - PATIENT PORTAL LINK FT
You can access the FollowMyHealth Patient Portal offered by [facility] by registering at the following website: [email or web address]. By joining [facility]’s FollowMyHealth portal, you will also be able to view your health information using other applications (apps) compatible with our system.

## 2020-04-01 RX ORDER — CYCLOBENZAPRINE HYDROCHLORIDE 10 MG/1
10 TABLET, FILM COATED ORAL
Qty: 0 | Refills: 0 | DISCHARGE

## 2020-04-01 RX ORDER — POTASSIUM CITRATE MONOHYDRATE 100 %
1 POWDER (GRAM) MISCELLANEOUS
Qty: 0 | Refills: 0 | DISCHARGE

## 2020-04-01 RX ORDER — GABAPENTIN 400 MG/1
300 CAPSULE ORAL
Qty: 0 | Refills: 0 | DISCHARGE

## 2020-04-30 PROCEDURE — 80048 BASIC METABOLIC PNL TOTAL CA: CPT

## 2020-04-30 PROCEDURE — 84132 ASSAY OF SERUM POTASSIUM: CPT

## 2020-04-30 PROCEDURE — 83605 ASSAY OF LACTIC ACID: CPT

## 2020-04-30 PROCEDURE — 93005 ELECTROCARDIOGRAM TRACING: CPT

## 2020-04-30 PROCEDURE — 84100 ASSAY OF PHOSPHORUS: CPT

## 2020-04-30 PROCEDURE — 82803 BLOOD GASES ANY COMBINATION: CPT

## 2020-04-30 PROCEDURE — 80053 COMPREHEN METABOLIC PANEL: CPT

## 2020-04-30 PROCEDURE — 94640 AIRWAY INHALATION TREATMENT: CPT

## 2020-04-30 PROCEDURE — 82947 ASSAY GLUCOSE BLOOD QUANT: CPT

## 2020-04-30 PROCEDURE — 71045 X-RAY EXAM CHEST 1 VIEW: CPT

## 2020-04-30 PROCEDURE — 99285 EMERGENCY DEPT VISIT HI MDM: CPT | Mod: 25

## 2020-04-30 PROCEDURE — 83615 LACTATE (LD) (LDH) ENZYME: CPT

## 2020-04-30 PROCEDURE — 82435 ASSAY OF BLOOD CHLORIDE: CPT

## 2020-04-30 PROCEDURE — 85379 FIBRIN DEGRADATION QUANT: CPT

## 2020-04-30 PROCEDURE — 86140 C-REACTIVE PROTEIN: CPT

## 2020-04-30 PROCEDURE — 85014 HEMATOCRIT: CPT

## 2020-04-30 PROCEDURE — U0001: CPT

## 2020-04-30 PROCEDURE — 83735 ASSAY OF MAGNESIUM: CPT

## 2020-04-30 PROCEDURE — 96374 THER/PROPH/DIAG INJ IV PUSH: CPT

## 2020-04-30 PROCEDURE — 82728 ASSAY OF FERRITIN: CPT

## 2020-04-30 PROCEDURE — 84295 ASSAY OF SERUM SODIUM: CPT

## 2020-04-30 PROCEDURE — 85027 COMPLETE CBC AUTOMATED: CPT

## 2020-04-30 PROCEDURE — 82330 ASSAY OF CALCIUM: CPT

## 2020-04-30 PROCEDURE — 87040 BLOOD CULTURE FOR BACTERIA: CPT

## 2020-05-11 PROBLEM — N20.0 CALCULUS OF KIDNEY: Chronic | Status: ACTIVE | Noted: 2020-03-24

## 2020-05-15 ENCOUNTER — APPOINTMENT (OUTPATIENT)
Dept: UROLOGY | Facility: CLINIC | Age: 45
End: 2020-05-15

## 2020-09-04 NOTE — DISCHARGE NOTE ADULT - NS DC ANGIO PCI YN
Progress Note - Nerissa Ortiz 1946, 66 y o  female MRN: [id number]    Unit/Bed#: [facility] 504-01 Encounter: [id number]    Primary Care Provider: TJ Salas   Date and time admitted to hospital: 8/25/2020  4:06 PM        Chronic diastolic heart failure [facility])  Assessment & Plan  8/30 IV Bumex & IV lasix given as pt overloaded from resuscitation and PRBC with excellent UOP  Now switched to PO torsemide 20 mg (was on Lasix 20 mg at home)    Stenosis of left carotid artery  Assessment & Plan  Vascular appreciated  ASA and statin  Carotid dopplers completed - LICA > 88%, VASQUEZ < 02%  Vascular recommends OP f/u sicne the etiology for her symptoms on presentation was most likely cardio embolic    Anemia  Assessment & Plan  S/p 1U PRBC in ICU  Appears stable at this time    Bacteremia due to Enterococcus  Assessment & Plan  B Cx pos for Enterococcus and Strep Constellatus  Zosyn x 14 days  ID appreciated  PICC consent obtained and PICC placed    Paroxysmal atrial fibrillation ([facility] [address] )  Assessment & Plan  S/p DCCV in route to [facility]  8/30 pt in Afib w/ moderate VR  Cardio appreciated  Heparin gtt - switched to coumadin  INR 2 2  stop drip, decrease coumadin to 2 mg  Lopressor for rate control - increased to 75 BID  TSH WNL    RORY (acute kidney injury) ([facility] [address] )  Assessment & Plan  Estimated Creatinine Clearance: 53 6 mL/min (by C-G formula based on SCr of 0 97 mg/dL)  POA  Patient noted to have RORY on admission; serum creatinine 2 59 (baseline 1 2-1 3)  2/2 shock  Appears to be resolved    Stroke-like symptoms  Assessment & Plan  - pt presents with to [facility] with right-sided facial droop and right-sided weakness after being transported by EMS for [facility] and noted to have atrial fibrillation enroute  - CT head in ED shows posterior cerebral artery disease is noted, mild on the right and severe left     - NIH - score on admission; - 6  -c/w ASA and statin  - MRI brain no acute findings  - CTA shows bilateral stenosis of carotids; less than 75%  LDL 23  Echo shows mild pulm htn  Neuro signed off  Pt now in Afib - placed on heparin gtt  Neuro recs BASA and AC in setting of Afib and carotid stenosis    Decubitus ulcer of sacral region, stage 4 [facility])  Assessment & Plan  POA  Wound care and piper surg appreciated  Patient reports ulcer developed status post recent hip fracture  8/26 excisional debridement of sacral pressure wound and washout   8/28 Debridement and washout of sacral decubitus ulcer  C/w trujillo  ID recs divesrsion colostomy to help w/ healing    D/w surgery - colostomy is not indicated as wound is distant from rectum and has not been but getting contaminated by stool  Wound VAC placed on 9/1    Hypertension  Assessment & Plan  Pt needed pressors on admission  Now on BB for Afib    Type 2 diabetes mellitus with hyperglycemia (HCC)  Assessment & Plan    Lab Results   Component Value Date    HGBA1C 9 9 (H) 08/26/2020     SSI  Blood Glucose checks TIDWM and QHS (Q6H for NPO patients)  Hold oral medications  Increase Lantus to 14 units, increase Humalog 5 units with meals  PT should take insulin at d/c    * Septic shock due to gram-positive bacteria (HCC)  Assessment & Plan  Pt now off pressors  ID appreciated  C/w Zosyn for total 14 days, D - 7  B Cx from 8/28 neg  2/2 infected stage 4 DQ ulcer  Cleared from ID standpoint for PICC as B Cx neg for 5 days        [facility] Internal Medicine Progress Note  Patient: Marlene Gallardo 66 y o  female   MRN: [id number]  PCP: TJ Green  Unit/Bed#: [facility] 504-01 Encounter: [id number]  Date Of Visit: 09/04/20    Assessment:    Principal Problem:    Septic shock due to gram-positive bacteria ([facility] [address] )  Active Problems:    Type 2 diabetes mellitus with hyperglycemia ([facility] [address] )    Hypertension    Hyperlipidemia    Decubitus ulcer of sacral region, stage 4 (HCC)    Stroke-like symptoms    RORY (acute kidney injury) ([facility] [address] )    Paroxysmal atrial fibrillation ([facility] [address] )    Bacteremia due to Enterococcus    Anemia    Stenosis of left carotid artery    Chronic diastolic heart failure (HCC)      Plan:       VTE Pharmacologic Prophylaxis:   Pharmacologic: Warfarin (Coumadin)  Mechanical VTE Prophylaxis in Place: Yes    Patient Centered Rounds: I have performed bedside rounds with nursing staff today  Discussions with Specialists or Other Care Team Provider:     Education and Discussions with Family / Patient: patient    Time Spent for Care: 30 minutes  More than 50% of total time spent on counseling and coordination of care as described above  Current Length of Stay: 10 day(s)    Current Patient Status: Inpatient   Certification Statement: The patient will continue to require additional inpatient hospital stay due to needs placement    Discharge Plan / Estimated Discharge Date:     Code Status: Level 1 - Full Code      Subjective:   Feels ok    Objective:     Vitals:   Temp (24hrs), Av °F (36 7 °C), Min:97 5 °F (36 4 °C), Max:98 4 °F (36 9 °C)    Temp:  [97 5 °F (36 4 °C)-98 4 °F (36 9 °C)] 97 8 °F (36 6 °C)  HR:  [] 101  Resp:  [18] 18  BP: (116-136)/(61-95) 121/69  SpO2:  [91 %-95 %] 95 %  Body mass index is 29 53 kg/m²  Input and Output Summary (last 24 hours): Intake/Output Summary (Last 24 hours) at 20208  Last data filed at 2020 1744  Gross per 24 hour   Intake 426 67 ml   Output 2295 ml   Net -1868 33 ml       Physical Exam:     Physical Exam  Vitals signs reviewed  HENT:      Head: Normocephalic and atraumatic  Cardiovascular:      Rate and Rhythm: Normal rate and regular rhythm  Heart sounds: Normal heart sounds  No murmur  No gallop  Pulmonary:      Effort: Pulmonary effort is normal  No respiratory distress  Breath sounds: Normal breath sounds  No wheezing  Abdominal:      General: There is no distension  Palpations: Abdomen is soft  Tenderness: There is no abdominal tenderness     Neurological:      Mental Status: She is alert and oriented to person, place, and time  Additional Data:     Labs:    Results from last 7 days   Lab Units 09/02/20  0518   WBC Thousand/uL 12 09*   HEMOGLOBIN g/dL 9 2*   HEMATOCRIT % 29 9*   PLATELETS Thousands/uL 202   LYMPHO PCT % 11*   MONO PCT % 2*   EOS PCT % 1     Results from last 7 days   Lab Units 09/04/20  0559   POTASSIUM mmol/L 4 0   CHLORIDE mmol/L 107   CO2 mmol/L 32   BUN mg/dL 42*   CREATININE mg/dL 0 97   CALCIUM mg/dL 8 2*     Results from last 7 days   Lab Units 09/04/20  0559   INR  3 09*       * I Have Reviewed All Lab Data Listed Above  * Additional Pertinent Lab Tests Reviewed: All Labs Within Last 24 Hours Reviewed    Imaging:    Imaging Reports Reviewed Today Include:   Imaging Personally Reviewed by Myself Includes:      Recent Cultures (last 7 days):           Last 24 Hours Medication List:   Current Facility-Administered Medications   Medication Dose Route Frequency Provider Last Rate    acetaminophen  650 mg Oral Q6H PRN [place] Tony, DO      aspirin  81 mg Oral Daily Dover Tony DO      atorvastatin  40 mg Oral Daily With [employer]ATRIZ      gabapentin  100 mg Oral TID Teagan Beavers PA-C      hydrALAZINE  10 mg Intravenous Q4H PRN Teagan Beavers PA-C      insulin glargine  14 Units Subcutaneous HS Gabriel Hinojosa MD      insulin lispro  1-6 Units Subcutaneous 4x Daily (AC & HS) Corine Sandifer, PA-C      insulin lispro  5 Units Subcutaneous TID With Meals Gabriel Hinojosa MD      metoprolol tartrate  25 mg Oral Once [place] ReadTJ      metoprolol tartrate  75 mg Oral Q12H [address] Robbie Woo MD      nystatin   Topical BID Teagan Beavers PA-C      piperacillin-tazobactam  2 25 g Intravenous Q6H DebiTJ Whitaker Stopped (09/04/20 1409)    torsemide  20 mg Oral Daily Robbie Woo MD          Today, Patient Was Seen By: Gabriel Hinojosa MD    ** Please Note: This note has been constructed using a voice recognition system  ** no

## 2023-02-20 NOTE — H&P ADULT - NSHPPOAPULMEMBOLUS_GEN_A_CORE
no Provider Procedure Text (A): After obtaining clear surgical margins the defect was repaired by another provider.

## 2023-09-25 NOTE — PROGRESS NOTE ADULT - SUBJECTIVE AND OBJECTIVE BOX
Mohsin Khan, MD  Attending Physician, Division Of Hospital Medicine  Pager: [phone], Office: [phone]  Off hour pager: [phone]    Patient is a 44y old  Male who presents with a chief complaint of hypoxia COVID19+ (31 Mar 2020 13:47)        SUBJECTIVE / OVERNIGHT EVENTS:      MEDICATIONS  (STANDING):  famotidine    Tablet 20 milliGRAM(s) Oral two times a day  heparin  Injectable 5000 Unit(s) SubCutaneous every 8 hours    MEDICATIONS  (PRN):  acetaminophen   Tablet .. 650 milliGRAM(s) Oral every 6 hours PRN Temp greater or equal to 38C (100.4F), Mild Pain (1 - 3), Moderate Pain (4 - 6)  diphenhydrAMINE   Injectable 50 milliGRAM(s) IV Push once PRN anaphylactic reaction to study drug  EPINEPHrine     1 mG/mL Injectable 0.3 milliGRAM(s) IntraMuscular once PRN anaphylactic reaction to study drug  gabapentin 300 milliGRAM(s) Oral three times a day PRN pain  ondansetron Injectable 4 milliGRAM(s) IV Push every 6 hours PRN Nausea and/or Vomiting  sodium chloride 0.65% Nasal 1 Spray(s) Both Nostrils every 3 hours PRN Nasal Congestion      Vital Signs Last 24 Hrs  T(C): 36.6 (31 Mar 2020 08:09), Max: 36.8 (31 Mar 2020 06:15)  T(F): 97.9 (31 Mar 2020 08:09), Max: 98.2 (31 Mar 2020 06:15)  HR: 86 (31 Mar 2020 08:09) (86 - 88)  BP: 111/79 (31 Mar 2020 08:09) (109/76 - 111/79)  BP(mean): --  RR: 18 (31 Mar 2020 08:09) (18 - 18)  SpO2: 93% (31 Mar 2020 14:35) (93% - 94%)  CAPILLARY BLOOD GLUCOSE        I&O's Summary    30 Mar 2020 07:01  -  31 Mar 2020 07:00  --------------------------------------------------------  IN: 300 mL / OUT: 750 mL / NET: -450 mL    31 Mar 2020 07:01  -  31 Mar 2020 18:32  --------------------------------------------------------  IN: 440 mL / OUT: 0 mL / NET: 440 mL        PHYSICAL EXAM:-  GENERAL: NAD, well-developed  EYES: EOMI, PERRLA, conjunctiva and sclera clear  NECK: Supple, No JVD, no thyromegaly  CHEST/LUNG: Clear to auscultation bilaterally; No wheeze  HEART: Regular rate and rhythm; S1, S2 audible, No murmurs, rubs, or gallops  ABDOMEN: Soft, Nontender, Nondistended; Bowel sounds present  EXTREMITIES:  2+ Peripheral Pulses, No clubbing, cyanosis, or edema  NEURO: AAOx3, no focal deficit      LABS:                        14.4   5.34  )-----------( 410      ( 31 Mar 2020 10:42 )             43.4     03-31    136  |  92<L>  |  16  ----------------------------<  90  3.7   |  30  |  0.93    Ca    9.5      31 Mar 2020 10:42  Phos  2.9     03-31  Mg     2.6     03-31    TPro  7.6  /  Alb  3.6  /  TBili  1.0  /  DBili  x   /  AST  99<H>  /  ALT  119<H>  /  AlkPhos  83  03-31      RADIOLOGY & ADDITIONAL TESTS:    Imaging Personally Reviewed: CXR  Consultant(s) Notes Reviewed: ID, Pulmonary  Care Discussed with Consultants/Other Providers: ID, Pulmonary mild

## 2023-10-01 PROBLEM — M54.16 LUMBAR RADICULAR PAIN: Status: ACTIVE | Noted: 2019-04-09

## 2024-02-26 NOTE — PATIENT PROFILE ADULT - MEDICATIONS/VISITS
In an effort to ensure that our patients LiveWell, a Team Member has reviewed your chart and identified an opportunity to provide the best care possible. An attempt was made to discuss or schedule overdue Preventive or Disease Management screening. The Outcome was Contact was made, care gap was discussed - see further documentation. Care Gaps include Wellness Visits.       Sending patient a list of pcp taking new patients, will make a follow up call no

## 2024-06-26 ENCOUNTER — EMERGENCY (EMERGENCY)
Facility: HOSPITAL | Age: 49
LOS: 1 days | Discharge: HOME | End: 2024-06-26
Attending: EMERGENCY MEDICINE
Payer: COMMERCIAL

## 2024-06-26 VITALS
HEART RATE: 73 BPM | RESPIRATION RATE: 18 BRPM | TEMPERATURE: 98 F | SYSTOLIC BLOOD PRESSURE: 118 MMHG | OXYGEN SATURATION: 100 % | DIASTOLIC BLOOD PRESSURE: 73 MMHG

## 2024-06-26 VITALS
RESPIRATION RATE: 20 BRPM | HEART RATE: 61 BPM | HEIGHT: 68 IN | OXYGEN SATURATION: 97 % | SYSTOLIC BLOOD PRESSURE: 125 MMHG | DIASTOLIC BLOOD PRESSURE: 88 MMHG | WEIGHT: 244.93 LBS | TEMPERATURE: 98 F

## 2024-06-26 DIAGNOSIS — Z96.7 PRESENCE OF OTHER BONE AND TENDON IMPLANTS: Chronic | ICD-10-CM

## 2024-06-26 DIAGNOSIS — Z98.89 OTHER SPECIFIED POSTPROCEDURAL STATES: Chronic | ICD-10-CM

## 2024-06-26 LAB
ALBUMIN SERPL ELPH-MCNC: 4.2 G/DL — SIGNIFICANT CHANGE UP (ref 3.3–5)
ALP SERPL-CCNC: 81 U/L — SIGNIFICANT CHANGE UP (ref 40–120)
ALT FLD-CCNC: 25 U/L — SIGNIFICANT CHANGE UP (ref 10–45)
ANION GAP SERPL CALC-SCNC: 16 MMOL/L — SIGNIFICANT CHANGE UP (ref 5–17)
APPEARANCE UR: CLEAR — SIGNIFICANT CHANGE UP
AST SERPL-CCNC: 25 U/L — SIGNIFICANT CHANGE UP (ref 10–40)
BACTERIA # UR AUTO: NEGATIVE /HPF — SIGNIFICANT CHANGE UP
BASE EXCESS BLDV CALC-SCNC: -3 MMOL/L — LOW (ref -2–3)
BASE EXCESS BLDV CALC-SCNC: 0 MMOL/L — SIGNIFICANT CHANGE UP (ref -2–3)
BASOPHILS # BLD AUTO: 0.03 K/UL — SIGNIFICANT CHANGE UP (ref 0–0.2)
BASOPHILS NFR BLD AUTO: 0.4 % — SIGNIFICANT CHANGE UP (ref 0–2)
BILIRUB SERPL-MCNC: 1.3 MG/DL — HIGH (ref 0.2–1.2)
BILIRUB UR-MCNC: NEGATIVE — SIGNIFICANT CHANGE UP
BUN SERPL-MCNC: 14 MG/DL — SIGNIFICANT CHANGE UP (ref 7–23)
CA-I SERPL-SCNC: 1.13 MMOL/L — LOW (ref 1.15–1.33)
CA-I SERPL-SCNC: 1.22 MMOL/L — SIGNIFICANT CHANGE UP (ref 1.15–1.33)
CALCIUM SERPL-MCNC: 9.8 MG/DL — SIGNIFICANT CHANGE UP (ref 8.4–10.5)
CAST: 3 /LPF — SIGNIFICANT CHANGE UP (ref 0–4)
CHLORIDE BLDV-SCNC: 105 MMOL/L — SIGNIFICANT CHANGE UP (ref 96–108)
CHLORIDE BLDV-SCNC: 108 MMOL/L — SIGNIFICANT CHANGE UP (ref 96–108)
CHLORIDE SERPL-SCNC: 105 MMOL/L — SIGNIFICANT CHANGE UP (ref 96–108)
CO2 BLDV-SCNC: 22 MMOL/L — SIGNIFICANT CHANGE UP (ref 22–26)
CO2 BLDV-SCNC: 24 MMOL/L — SIGNIFICANT CHANGE UP (ref 22–26)
CO2 SERPL-SCNC: 19 MMOL/L — LOW (ref 22–31)
COLOR SPEC: YELLOW — SIGNIFICANT CHANGE UP
CREAT SERPL-MCNC: 1.25 MG/DL — SIGNIFICANT CHANGE UP (ref 0.5–1.3)
DIFF PNL FLD: ABNORMAL
EGFR: 70 ML/MIN/1.73M2 — SIGNIFICANT CHANGE UP
EOSINOPHIL # BLD AUTO: 0.01 K/UL — SIGNIFICANT CHANGE UP (ref 0–0.5)
EOSINOPHIL NFR BLD AUTO: 0.1 % — SIGNIFICANT CHANGE UP (ref 0–6)
GAS PNL BLDV: 133 MMOL/L — LOW (ref 136–145)
GAS PNL BLDV: 135 MMOL/L — LOW (ref 136–145)
GAS PNL BLDV: SIGNIFICANT CHANGE UP
GAS PNL BLDV: SIGNIFICANT CHANGE UP
GLUCOSE BLDV-MCNC: 120 MG/DL — HIGH (ref 70–99)
GLUCOSE BLDV-MCNC: 85 MG/DL — SIGNIFICANT CHANGE UP (ref 70–99)
GLUCOSE SERPL-MCNC: 129 MG/DL — HIGH (ref 70–99)
GLUCOSE UR QL: NEGATIVE MG/DL — SIGNIFICANT CHANGE UP
HCO3 BLDV-SCNC: 22 MMOL/L — SIGNIFICANT CHANGE UP (ref 22–29)
HCO3 BLDV-SCNC: 23 MMOL/L — SIGNIFICANT CHANGE UP (ref 22–29)
HCT VFR BLD CALC: 43.8 % — SIGNIFICANT CHANGE UP (ref 39–50)
HCT VFR BLDA CALC: 44 % — SIGNIFICANT CHANGE UP (ref 39–51)
HCT VFR BLDA CALC: 47 % — SIGNIFICANT CHANGE UP (ref 39–51)
HGB BLD CALC-MCNC: 14.5 G/DL — SIGNIFICANT CHANGE UP (ref 12.6–17.4)
HGB BLD CALC-MCNC: 15.7 G/DL — SIGNIFICANT CHANGE UP (ref 12.6–17.4)
HGB BLD-MCNC: 15.5 G/DL — SIGNIFICANT CHANGE UP (ref 13–17)
IMM GRANULOCYTES NFR BLD AUTO: 0.6 % — SIGNIFICANT CHANGE UP (ref 0–0.9)
KETONES UR-MCNC: ABNORMAL MG/DL
LACTATE BLDV-MCNC: 1.5 MMOL/L — SIGNIFICANT CHANGE UP (ref 0.5–2)
LACTATE BLDV-MCNC: 2.6 MMOL/L — HIGH (ref 0.5–2)
LEUKOCYTE ESTERASE UR-ACNC: ABNORMAL
LYMPHOCYTES # BLD AUTO: 2.11 K/UL — SIGNIFICANT CHANGE UP (ref 1–3.3)
LYMPHOCYTES # BLD AUTO: 25.7 % — SIGNIFICANT CHANGE UP (ref 13–44)
MCHC RBC-ENTMCNC: 30.6 PG — SIGNIFICANT CHANGE UP (ref 27–34)
MCHC RBC-ENTMCNC: 35.4 GM/DL — SIGNIFICANT CHANGE UP (ref 32–36)
MCV RBC AUTO: 86.6 FL — SIGNIFICANT CHANGE UP (ref 80–100)
MONOCYTES # BLD AUTO: 0.47 K/UL — SIGNIFICANT CHANGE UP (ref 0–0.9)
MONOCYTES NFR BLD AUTO: 5.7 % — SIGNIFICANT CHANGE UP (ref 2–14)
NEUTROPHILS # BLD AUTO: 5.54 K/UL — SIGNIFICANT CHANGE UP (ref 1.8–7.4)
NEUTROPHILS NFR BLD AUTO: 67.5 % — SIGNIFICANT CHANGE UP (ref 43–77)
NITRITE UR-MCNC: NEGATIVE — SIGNIFICANT CHANGE UP
NRBC # BLD: 0 /100 WBCS — SIGNIFICANT CHANGE UP (ref 0–0)
PCO2 BLDV: 27 MMHG — LOW (ref 42–55)
PCO2 BLDV: 42 MMHG — SIGNIFICANT CHANGE UP (ref 42–55)
PH BLDV: 7.34 — SIGNIFICANT CHANGE UP (ref 7.32–7.43)
PH BLDV: 7.51 — HIGH (ref 7.32–7.43)
PH UR: 6 — SIGNIFICANT CHANGE UP (ref 5–8)
PLATELET # BLD AUTO: 220 K/UL — SIGNIFICANT CHANGE UP (ref 150–400)
PO2 BLDV: 56 MMHG — HIGH (ref 25–45)
PO2 BLDV: 76 MMHG — HIGH (ref 25–45)
POTASSIUM BLDV-SCNC: 4.4 MMOL/L — SIGNIFICANT CHANGE UP (ref 3.5–5.1)
POTASSIUM BLDV-SCNC: 7.1 MMOL/L — CRITICAL HIGH (ref 3.5–5.1)
POTASSIUM SERPL-MCNC: 4.3 MMOL/L — SIGNIFICANT CHANGE UP (ref 3.5–5.3)
POTASSIUM SERPL-SCNC: 4.3 MMOL/L — SIGNIFICANT CHANGE UP (ref 3.5–5.3)
PROT SERPL-MCNC: 7.5 G/DL — SIGNIFICANT CHANGE UP (ref 6–8.3)
PROT UR-MCNC: NEGATIVE MG/DL — SIGNIFICANT CHANGE UP
RBC # BLD: 5.06 M/UL — SIGNIFICANT CHANGE UP (ref 4.2–5.8)
RBC # FLD: 12.8 % — SIGNIFICANT CHANGE UP (ref 10.3–14.5)
RBC CASTS # UR COMP ASSIST: 1 /HPF — SIGNIFICANT CHANGE UP (ref 0–4)
REVIEW: SIGNIFICANT CHANGE UP
SAO2 % BLDV: 87.9 % — SIGNIFICANT CHANGE UP (ref 67–88)
SAO2 % BLDV: 97.5 % — HIGH (ref 67–88)
SODIUM SERPL-SCNC: 140 MMOL/L — SIGNIFICANT CHANGE UP (ref 135–145)
SP GR SPEC: 1.02 — SIGNIFICANT CHANGE UP (ref 1–1.03)
SQUAMOUS # UR AUTO: 3 /HPF — SIGNIFICANT CHANGE UP (ref 0–5)
UROBILINOGEN FLD QL: 0.2 MG/DL — SIGNIFICANT CHANGE UP (ref 0.2–1)
WBC # BLD: 8.21 K/UL — SIGNIFICANT CHANGE UP (ref 3.8–10.5)
WBC # FLD AUTO: 8.21 K/UL — SIGNIFICANT CHANGE UP (ref 3.8–10.5)
WBC UR QL: 6 /HPF — HIGH (ref 0–5)

## 2024-06-26 PROCEDURE — 87086 URINE CULTURE/COLONY COUNT: CPT

## 2024-06-26 PROCEDURE — 96376 TX/PRO/DX INJ SAME DRUG ADON: CPT

## 2024-06-26 PROCEDURE — 85018 HEMOGLOBIN: CPT

## 2024-06-26 PROCEDURE — 74176 CT ABD & PELVIS W/O CONTRAST: CPT | Mod: 26,MC

## 2024-06-26 PROCEDURE — 82330 ASSAY OF CALCIUM: CPT

## 2024-06-26 PROCEDURE — 99284 EMERGENCY DEPT VISIT MOD MDM: CPT

## 2024-06-26 PROCEDURE — 74176 CT ABD & PELVIS W/O CONTRAST: CPT | Mod: MC

## 2024-06-26 PROCEDURE — 81001 URINALYSIS AUTO W/SCOPE: CPT

## 2024-06-26 PROCEDURE — 82803 BLOOD GASES ANY COMBINATION: CPT

## 2024-06-26 PROCEDURE — 85014 HEMATOCRIT: CPT

## 2024-06-26 PROCEDURE — 84295 ASSAY OF SERUM SODIUM: CPT

## 2024-06-26 PROCEDURE — 80053 COMPREHEN METABOLIC PANEL: CPT

## 2024-06-26 PROCEDURE — 85025 COMPLETE CBC W/AUTO DIFF WBC: CPT

## 2024-06-26 PROCEDURE — 99284 EMERGENCY DEPT VISIT MOD MDM: CPT | Mod: 25

## 2024-06-26 PROCEDURE — 96361 HYDRATE IV INFUSION ADD-ON: CPT

## 2024-06-26 PROCEDURE — 96375 TX/PRO/DX INJ NEW DRUG ADDON: CPT

## 2024-06-26 PROCEDURE — 83605 ASSAY OF LACTIC ACID: CPT

## 2024-06-26 PROCEDURE — 36415 COLL VENOUS BLD VENIPUNCTURE: CPT

## 2024-06-26 PROCEDURE — 82947 ASSAY GLUCOSE BLOOD QUANT: CPT

## 2024-06-26 PROCEDURE — 84132 ASSAY OF SERUM POTASSIUM: CPT

## 2024-06-26 PROCEDURE — 96374 THER/PROPH/DIAG INJ IV PUSH: CPT | Mod: XU

## 2024-06-26 PROCEDURE — 82435 ASSAY OF BLOOD CHLORIDE: CPT

## 2024-06-26 RX ORDER — KETOROLAC TROMETHAMINE 30 MG/ML
15 SYRINGE (ML) INJECTION ONCE
Refills: 0 | Status: DISCONTINUED | OUTPATIENT
Start: 2024-06-26 | End: 2024-06-26

## 2024-06-26 RX ORDER — SODIUM CHLORIDE 9 MG/ML
1000 INJECTION INTRAMUSCULAR; INTRAVENOUS; SUBCUTANEOUS ONCE
Refills: 0 | Status: COMPLETED | OUTPATIENT
Start: 2024-06-26 | End: 2024-06-26

## 2024-06-26 RX ORDER — HYDROMORPHONE HYDROCHLORIDE 2 MG/ML
1 INJECTION INTRAMUSCULAR; INTRAVENOUS; SUBCUTANEOUS ONCE
Refills: 0 | Status: DISCONTINUED | OUTPATIENT
Start: 2024-06-26 | End: 2024-06-26

## 2024-06-26 RX ORDER — ONDANSETRON 8 MG/1
4 TABLET, FILM COATED ORAL ONCE
Refills: 0 | Status: COMPLETED | OUTPATIENT
Start: 2024-06-26 | End: 2024-06-26

## 2024-06-26 RX ADMIN — SODIUM CHLORIDE 1000 MILLILITER(S): 9 INJECTION INTRAMUSCULAR; INTRAVENOUS; SUBCUTANEOUS at 11:16

## 2024-06-26 RX ADMIN — SODIUM CHLORIDE 1000 MILLILITER(S): 9 INJECTION INTRAMUSCULAR; INTRAVENOUS; SUBCUTANEOUS at 13:00

## 2024-06-26 RX ADMIN — SODIUM CHLORIDE 1000 MILLILITER(S): 9 INJECTION INTRAMUSCULAR; INTRAVENOUS; SUBCUTANEOUS at 10:13

## 2024-06-26 RX ADMIN — Medication 15 MILLIGRAM(S): at 18:30

## 2024-06-26 RX ADMIN — Medication 15 MILLIGRAM(S): at 10:11

## 2024-06-26 RX ADMIN — Medication 15 MILLIGRAM(S): at 10:45

## 2024-06-26 RX ADMIN — SODIUM CHLORIDE 1000 MILLILITER(S): 9 INJECTION INTRAMUSCULAR; INTRAVENOUS; SUBCUTANEOUS at 11:58

## 2024-06-26 RX ADMIN — ONDANSETRON 4 MILLIGRAM(S): 8 TABLET, FILM COATED ORAL at 10:12

## 2024-06-26 NOTE — ED PROVIDER NOTE - ATTENDING APP SHARED VISIT CONTRIBUTION OF CARE
Patient is a 48-year-old male history of kidney stones in the past sees Dr. Hurt never had any intervention last week was having right-sided flank pain believes he passed 2 small stones says over the weekend he has been complaining of left-sided flank pain but being controlled with NSAIDs.  Patient today was driving when he went to a [employer] parking lot to get something to drink side suddenly had sharp worsening left-sided flank pain rating around to his abdomen with nausea and vomiting called an ambulance brought here in severe pain.  Discussed case with PMD CT imaging ordered for renal colic workup IV fluids analgesia antiemetics reassess

## 2024-06-26 NOTE — ED PROVIDER NOTE - NSICDXPASTSURGICALHX_GEN_ALL_CORE_FT
PAST SURGICAL HISTORY:  Fixation hardware in leg     H/O lumbar discectomy     S/P ORIF (open reduction internal fixation) fracture of the right ankle in Jan 2016

## 2024-06-26 NOTE — ED PROVIDER NOTE - PATIENT PORTAL LINK FT
You can access the FollowMyHealth Patient Portal offered by [facility] by registering at the following website: [email or web address]. By joining [employer]’s FollowMyHealth portal, you will also be able to view your health information using other applications (apps) compatible with our system.

## 2024-06-26 NOTE — ED PROVIDER NOTE - PROGRESS NOTE DETAILS
Pt re-assessed, labs and imaging discussed, reports he still feels some pain after percocet and is now concerned this may be msk back pain. Reports chronic back pain since 2016, walks with a cane and has an aide at home, takes muscle relaxer and gabapentin daily. Reports he has required admission in the past for his back pain. Offered additional pain medication however pt prefers to wait and see how his pain progresses and in mean time plans to call his brother who is a physician to discuss. Will re-assess. - Tova Chen PA-C Pt requesting to wait to d/w his brother, Dr. Villarreal, who is now at bedside. Pt again offered additional pain control earlier, amenable to toradol but only after his family member came with food for him. - Tova Chen PA-C Pt comfortable going home. Tolerated PO. Advised f/u with his Urologist. - Tova Chen PA-C

## 2024-06-26 NOTE — ED PROVIDER NOTE - NSFOLLOWUPINSTRUCTIONS_ED_ALL_ED_FT
36.7
Stay well hydrated.   Continue your current medication regimen.  Follow up with your Primary Care Provider and Urologist upon discharge.     Bring a copy of your test results (attached along with your discharge paperwork) with you to your appointment for further discussion, evaluation, and comparison with your prior results.    Please return to the Emergency Department immediately for any new, worsening, or concerning symptoms.

## 2024-06-26 NOTE — ED ADULT NURSE NOTE - OBJECTIVE STATEMENT
Male 48 years old with history of Kidney stones brought in by EMS for left flank pain.  Pt states he started to have mild left flank pain this morning relieved with hot shower.  States this morning he was on his way to court, stopped by [address] to get coffee, sudden felt of sharp left flank pain radiating to his abdomen with nausea and vomiting. Went to his car and called EMS.

## 2024-06-26 NOTE — ED PROVIDER NOTE - OBJECTIVE STATEMENT
49yo M with PMH of kidney stones BIBEMS c/o L flank pain that began this morning. Reports he had R flank pain last week and passed 2 stones. Reports he started to have mild L flank pain a few days ago that resolved, and this morning returned while in the shower. Reports he tried to go to work, stopped at [employer] for a drink and pain acutely worsened while in his car so he called EMS. Pain radiates to L side of abdomen. Has not taken anything for pain. +associated nausea and vomiting. Also reports dysuria today. Denies fever/chills, hematuria.   Urologist Dmitry

## 2024-06-27 LAB
CULTURE RESULTS: SIGNIFICANT CHANGE UP
SPECIMEN SOURCE: SIGNIFICANT CHANGE UP

## 2024-07-12 NOTE — ED ADULT NURSE NOTE - CAS DISCH BELONGINGS RETURNED
No care due was identified.  [facility] Embedded Care Due Messages. Reference number: [id number].   7/12/2024 8:47:29 AM CDT   Yes

## 2024-09-18 NOTE — ED PROVIDER NOTE - PSH
Cell number on file:    Telephone Information:   Mobile [phone]     Is it ok to leave a message at this number(s)? Yes    Dr Mojica completed your procedure on 9/18/2024.    Current Pain Level (0-10 Scale): 2/10  Post Pain Level (0-10):  0/10    Radiology Discharge instructions for Steroid Injection    Activity Level:     Do not do any heavy activity or exercise for 24 hours.   Do not drive for 4 hours after your injection.  Diet:   Return to your normal diet.  Medications:   If you have stopped taking your Aspirin, Coumadin/Warfarin, Ibuprofen, or any   other blood thinner for this procedure you may resume in the morning unless   your primary care provider has given you other instructions.    Diabetics may see an increase in blood sugar after steroid injections. If you are concerned about your blood sugar, please contact your family doctor.    Site Care:  Remove the bandage and bathe or shower the morning after the procedure.      This is a Pain Management procedure.  You will be contacted in two weeks for follow up.    Call your Primary Care Provider if you have the following (if your primary care provider is not available please seek emergency care):   Nausea with vomiting   Severe headache   Drowsiness or confusion   Redness or drainage at the injection or puncture site   Temperature over 101 degrees F   Other concerns   Worsening back pain   Stiff neck       H/O lumbar discectomy    S/P ORIF (open reduction internal fixation) fracture  of the right ankle in Jan 2016 Attending Attestation (For Attendings USE Only)...

## 2024-10-28 ENCOUNTER — INPATIENT (INPATIENT)
Facility: HOSPITAL | Age: 49
LOS: 1 days | Discharge: HOME | DRG: 392 | End: 2024-10-30
Attending: INTERNAL MEDICINE | Admitting: INTERNAL MEDICINE
Payer: COMMERCIAL

## 2024-10-28 VITALS
SYSTOLIC BLOOD PRESSURE: 112 MMHG | HEIGHT: 68 IN | OXYGEN SATURATION: 97 % | WEIGHT: 244.93 LBS | HEART RATE: 55 BPM | TEMPERATURE: 98 F | DIASTOLIC BLOOD PRESSURE: 79 MMHG | RESPIRATION RATE: 18 BRPM

## 2024-10-28 DIAGNOSIS — Z96.7 PRESENCE OF OTHER BONE AND TENDON IMPLANTS: Chronic | ICD-10-CM

## 2024-10-28 DIAGNOSIS — Z98.89 OTHER SPECIFIED POSTPROCEDURAL STATES: Chronic | ICD-10-CM

## 2024-10-28 LAB
ALBUMIN SERPL ELPH-MCNC: 4.2 G/DL — SIGNIFICANT CHANGE UP (ref 3.3–5)
ALP SERPL-CCNC: 103 U/L — SIGNIFICANT CHANGE UP (ref 40–120)
ALT FLD-CCNC: 17 U/L — SIGNIFICANT CHANGE UP (ref 10–45)
ANION GAP SERPL CALC-SCNC: 11 MMOL/L — SIGNIFICANT CHANGE UP (ref 5–17)
APPEARANCE UR: CLEAR — SIGNIFICANT CHANGE UP
AST SERPL-CCNC: 15 U/L — SIGNIFICANT CHANGE UP (ref 10–40)
BACTERIA # UR AUTO: NEGATIVE /HPF — SIGNIFICANT CHANGE UP
BASOPHILS # BLD AUTO: 0.04 K/UL — SIGNIFICANT CHANGE UP (ref 0–0.2)
BASOPHILS NFR BLD AUTO: 0.4 % — SIGNIFICANT CHANGE UP (ref 0–2)
BILIRUB SERPL-MCNC: 0.5 MG/DL — SIGNIFICANT CHANGE UP (ref 0.2–1.2)
BILIRUB UR-MCNC: NEGATIVE — SIGNIFICANT CHANGE UP
BUN SERPL-MCNC: 16 MG/DL — SIGNIFICANT CHANGE UP (ref 7–23)
CALCIUM SERPL-MCNC: 9.3 MG/DL — SIGNIFICANT CHANGE UP (ref 8.4–10.5)
CAST: 0 /LPF — SIGNIFICANT CHANGE UP (ref 0–4)
CHLORIDE SERPL-SCNC: 104 MMOL/L — SIGNIFICANT CHANGE UP (ref 96–108)
CO2 SERPL-SCNC: 25 MMOL/L — SIGNIFICANT CHANGE UP (ref 22–31)
COLOR SPEC: YELLOW — SIGNIFICANT CHANGE UP
CREAT SERPL-MCNC: 1.47 MG/DL — HIGH (ref 0.5–1.3)
DIFF PNL FLD: NEGATIVE — SIGNIFICANT CHANGE UP
EGFR: 57 ML/MIN/1.73M2 — LOW
EOSINOPHIL # BLD AUTO: 0.06 K/UL — SIGNIFICANT CHANGE UP (ref 0–0.5)
EOSINOPHIL NFR BLD AUTO: 0.6 % — SIGNIFICANT CHANGE UP (ref 0–6)
GAS PNL BLDV: SIGNIFICANT CHANGE UP
GLUCOSE SERPL-MCNC: 96 MG/DL — SIGNIFICANT CHANGE UP (ref 70–99)
GLUCOSE UR QL: NEGATIVE MG/DL — SIGNIFICANT CHANGE UP
HCT VFR BLD CALC: 41.8 % — SIGNIFICANT CHANGE UP (ref 39–50)
HGB BLD-MCNC: 14.5 G/DL — SIGNIFICANT CHANGE UP (ref 13–17)
IMM GRANULOCYTES NFR BLD AUTO: 0.8 % — SIGNIFICANT CHANGE UP (ref 0–0.9)
KETONES UR-MCNC: NEGATIVE MG/DL — SIGNIFICANT CHANGE UP
LEUKOCYTE ESTERASE UR-ACNC: NEGATIVE — SIGNIFICANT CHANGE UP
LIDOCAIN IGE QN: 38 U/L — SIGNIFICANT CHANGE UP (ref 7–60)
LYMPHOCYTES # BLD AUTO: 19.7 % — SIGNIFICANT CHANGE UP (ref 13–44)
LYMPHOCYTES # BLD AUTO: 2.08 K/UL — SIGNIFICANT CHANGE UP (ref 1–3.3)
MCHC RBC-ENTMCNC: 29.6 PG — SIGNIFICANT CHANGE UP (ref 27–34)
MCHC RBC-ENTMCNC: 34.7 GM/DL — SIGNIFICANT CHANGE UP (ref 32–36)
MCV RBC AUTO: 85.3 FL — SIGNIFICANT CHANGE UP (ref 80–100)
MONOCYTES # BLD AUTO: 0.81 K/UL — SIGNIFICANT CHANGE UP (ref 0–0.9)
MONOCYTES NFR BLD AUTO: 7.7 % — SIGNIFICANT CHANGE UP (ref 2–14)
NEUTROPHILS # BLD AUTO: 7.48 K/UL — HIGH (ref 1.8–7.4)
NEUTROPHILS NFR BLD AUTO: 70.8 % — SIGNIFICANT CHANGE UP (ref 43–77)
NITRITE UR-MCNC: NEGATIVE — SIGNIFICANT CHANGE UP
NRBC # BLD: 0 /100 WBCS — SIGNIFICANT CHANGE UP (ref 0–0)
PH UR: 5.5 — SIGNIFICANT CHANGE UP (ref 5–8)
PLATELET # BLD AUTO: 189 K/UL — SIGNIFICANT CHANGE UP (ref 150–400)
POTASSIUM SERPL-MCNC: 4.4 MMOL/L — SIGNIFICANT CHANGE UP (ref 3.5–5.3)
POTASSIUM SERPL-SCNC: 4.4 MMOL/L — SIGNIFICANT CHANGE UP (ref 3.5–5.3)
PROT SERPL-MCNC: 7.1 G/DL — SIGNIFICANT CHANGE UP (ref 6–8.3)
PROT UR-MCNC: NEGATIVE MG/DL — SIGNIFICANT CHANGE UP
RBC # BLD: 4.9 M/UL — SIGNIFICANT CHANGE UP (ref 4.2–5.8)
RBC # FLD: 12.6 % — SIGNIFICANT CHANGE UP (ref 10.3–14.5)
RBC CASTS # UR COMP ASSIST: 0 /HPF — SIGNIFICANT CHANGE UP (ref 0–4)
SODIUM SERPL-SCNC: 140 MMOL/L — SIGNIFICANT CHANGE UP (ref 135–145)
SP GR SPEC: 1.02 — SIGNIFICANT CHANGE UP (ref 1–1.03)
SQUAMOUS # UR AUTO: 2 /HPF — SIGNIFICANT CHANGE UP (ref 0–5)
UROBILINOGEN FLD QL: 0.2 MG/DL — SIGNIFICANT CHANGE UP (ref 0.2–1)
WBC # BLD: 10.55 K/UL — HIGH (ref 3.8–10.5)
WBC # FLD AUTO: 10.55 K/UL — HIGH (ref 3.8–10.5)
WBC UR QL: 2 /HPF — SIGNIFICANT CHANGE UP (ref 0–5)

## 2024-10-28 PROCEDURE — 36410 VNPNXR 3YR/> PHY/QHP DX/THER: CPT

## 2024-10-28 PROCEDURE — 76937 US GUIDE VASCULAR ACCESS: CPT | Mod: 59

## 2024-10-28 PROCEDURE — 99223 1ST HOSP IP/OBS HIGH 75: CPT | Mod: 25

## 2024-10-28 PROCEDURE — 36000 PLACE NEEDLE IN VEIN: CPT

## 2024-10-28 PROCEDURE — 76770 US EXAM ABDO BACK WALL COMP: CPT | Mod: 26

## 2024-10-28 RX ORDER — MORPHINE SULFATE 30 MG/1
4 TABLET, EXTENDED RELEASE ORAL ONCE
Refills: 0 | Status: DISCONTINUED | OUTPATIENT
Start: 2024-10-28 | End: 2024-10-28

## 2024-10-28 RX ORDER — ACETAMINOPHEN 500 MG
1000 TABLET ORAL ONCE
Refills: 0 | Status: COMPLETED | OUTPATIENT
Start: 2024-10-28 | End: 2024-10-28

## 2024-10-28 RX ORDER — SODIUM CHLORIDE 9 MG/ML
1000 INJECTION, SOLUTION INTRAMUSCULAR; INTRAVENOUS; SUBCUTANEOUS ONCE
Refills: 0 | Status: COMPLETED | OUTPATIENT
Start: 2024-10-28 | End: 2024-10-28

## 2024-10-28 RX ORDER — KETOROLAC TROMETHAMINE 30 MG/ML
15 INJECTION INTRAMUSCULAR; INTRAVENOUS ONCE
Refills: 0 | Status: DISCONTINUED | OUTPATIENT
Start: 2024-10-28 | End: 2024-10-28

## 2024-10-28 RX ORDER — ONDANSETRON HYDROCHLORIDE 2 MG/ML
4 INJECTION, SOLUTION INTRAMUSCULAR; INTRAVENOUS ONCE
Refills: 0 | Status: COMPLETED | OUTPATIENT
Start: 2024-10-28 | End: 2024-10-28

## 2024-10-28 RX ORDER — TAMSULOSIN HCL 0.4 MG
0.4 CAPSULE ORAL AT BEDTIME
Refills: 0 | Status: DISCONTINUED | OUTPATIENT
Start: 2024-10-28 | End: 2024-10-30

## 2024-10-28 RX ORDER — SODIUM CHLORIDE 9 MG/ML
1000 INJECTION, SOLUTION INTRAMUSCULAR; INTRAVENOUS; SUBCUTANEOUS
Refills: 0 | Status: DISCONTINUED | OUTPATIENT
Start: 2024-10-28 | End: 2024-10-29

## 2024-10-28 RX ORDER — CEFTRIAXONE SODIUM 10 G
1000 VIAL (EA) INJECTION ONCE
Refills: 0 | Status: COMPLETED | OUTPATIENT
Start: 2024-10-28 | End: 2024-10-28

## 2024-10-28 RX ADMIN — Medication 100 MILLIGRAM(S): at 21:51

## 2024-10-28 RX ADMIN — ONDANSETRON HYDROCHLORIDE 4 MILLIGRAM(S): 2 INJECTION, SOLUTION INTRAMUSCULAR; INTRAVENOUS at 01:30

## 2024-10-28 RX ADMIN — MORPHINE SULFATE 4 MILLIGRAM(S): 30 TABLET, EXTENDED RELEASE ORAL at 01:30

## 2024-10-28 RX ADMIN — SODIUM CHLORIDE 100 MILLILITER(S): 9 INJECTION, SOLUTION INTRAMUSCULAR; INTRAVENOUS; SUBCUTANEOUS at 21:51

## 2024-10-28 RX ADMIN — Medication 400 MILLIGRAM(S): at 06:17

## 2024-10-28 RX ADMIN — ONDANSETRON HYDROCHLORIDE 4 MILLIGRAM(S): 2 INJECTION, SOLUTION INTRAMUSCULAR; INTRAVENOUS at 06:15

## 2024-10-28 RX ADMIN — MORPHINE SULFATE 4 MILLIGRAM(S): 30 TABLET, EXTENDED RELEASE ORAL at 02:30

## 2024-10-28 RX ADMIN — KETOROLAC TROMETHAMINE 15 MILLIGRAM(S): 30 INJECTION INTRAMUSCULAR; INTRAVENOUS at 09:35

## 2024-10-28 RX ADMIN — SODIUM CHLORIDE 1000 MILLILITER(S): 9 INJECTION, SOLUTION INTRAMUSCULAR; INTRAVENOUS; SUBCUTANEOUS at 01:30

## 2024-10-28 RX ADMIN — SODIUM CHLORIDE 100 MILLILITER(S): 9 INJECTION, SOLUTION INTRAMUSCULAR; INTRAVENOUS; SUBCUTANEOUS at 12:26

## 2024-10-28 RX ADMIN — SODIUM CHLORIDE 1000 MILLILITER(S): 9 INJECTION, SOLUTION INTRAMUSCULAR; INTRAVENOUS; SUBCUTANEOUS at 06:42

## 2024-10-28 RX ADMIN — KETOROLAC TROMETHAMINE 15 MILLIGRAM(S): 30 INJECTION INTRAMUSCULAR; INTRAVENOUS at 05:14

## 2024-10-28 RX ADMIN — KETOROLAC TROMETHAMINE 15 MILLIGRAM(S): 30 INJECTION INTRAMUSCULAR; INTRAVENOUS at 18:13

## 2024-10-28 RX ADMIN — KETOROLAC TROMETHAMINE 15 MILLIGRAM(S): 30 INJECTION INTRAMUSCULAR; INTRAVENOUS at 02:46

## 2024-10-28 RX ADMIN — Medication 400 MILLIGRAM(S): at 21:27

## 2024-10-28 NOTE — ED PROVIDER NOTE - PROGRESS NOTE DETAILS
Maurilio Reardon MD: Patient reassessed still in pain and had nausea. Zofran and Ofirmev re-dosed. CTAP shows left hydronephrosis without clear evidence of radiopaque stone however suggestion of possible obstruction at level of UVJ (non-radiopaque stone vs clot). Given patient still symptomatic, will give another liter of fluid, uro to be consulted if no improvement. Saint Chris, DO (PGY2): Patient signed out to me at 0 700 pending reassessment.  Briefly, he is a 51-year-old male with a history of kidney stones, who presented for left-sided flank pain.  Normal white count.  Negative UA.  CT showing "left perinephric stranding, mild left hydronephrosis and hydroureter to the level of the urinary bladder.  No definite peritoneal plaque obstructive stone is seen however, at the left ureterovesicular junction there is mild increased density which may represent a blood clot or non-radiopaque stone".  Patient seen by urology, no acute intervention at this time.  Patient reassessed, continuing to report pain.  No CVA tenderness on exam.  No midline spinal tenderness throughout.  Will go to obsercation unit for pain control. Patient in agreement. [place] Chris, DO (PGY2): Patient signed out to me at 0 700 pending reassessment.  Briefly, he is a 51-year-old male with a history of kidney stones, who presented for left-sided flank pain.  Normal white count.  Negative UA.  CT showing "left perinephric stranding, mild left hydronephrosis and hydroureter to the level of the urinary bladder.  No definite peritoneal plaque obstructive stone is seen however, at the left ureterovesicular junction there is mild increased density which may represent a blood clot or non-radiopaque stone".  I independently interpreted the chest xray: no acute findings. Patient seen by urology, no acute intervention at this time.  Patient reassessed, continuing to report pain.  No CVA tenderness on exam.  No midline spinal tenderness throughout.  Will go to obsercation unit for pain control. Patient in agreement.

## 2024-10-28 NOTE — ED ADULT NURSE NOTE - NSFALLRISKINTERV_ED_ALL_ED
Assistance OOB with selected safe patient handling equipment if applicable/Assistance with ambulation/Communicate fall risk and risk factors to all staff, patient, and family/Monitor gait and stability/Provide visual cue: yellow wristband, yellow gown, etc/Reinforce activity limits and safety measures with patient and family/Call bell, personal items and telephone in reach/Instruct patient to call for assistance before getting out of bed/chair/stretcher/Non-slip footwear applied when patient is off stretcher/[place] to call system/Physically safe environment - no spills, clutter or unnecessary equipment/Purposeful Proactive Rounding/Room/bathroom lighting operational, light cord in reach

## 2024-10-28 NOTE — ED ADULT NURSE REASSESSMENT NOTE - NS ED NURSE REASSESS COMMENT FT1
Patient resting in stretcher, RN attempted to do morning VS but pt refusing states "not now." RN will reattempt doing VS at a later time. Pt denying any acute complaints. Awaits urology consult

## 2024-10-28 NOTE — CONSULT NOTE ADULT - ASSESSMENT
48yo male with left flank pain likely 2/2 recently passed stone versus small stone fragment, currently pain controlled.  - flomax / pain control   - increase hydration   - f/u urine culture, although UA appears clean   - discussed case with outpatient urologist Dr. Andres, no  intervention necessary, can admit to medicine if necessary for pain control   - outpatient  followup with Dr. Andres for further work up    48yo male with left flank pain likely 2/2 recently passed stone versus small stone fragment, currently pain controlled.  Urology called to reassess 10/29 AM because pain continued to be uncontrolled and patient would be admitted to medicine from CDU.     Patient is still AVSS with relatively stable Cr 1.55 from 1.47 (baseline 1.25 in 6/2024) even after 4x toradol. If patient does in fact have a stone at L UVJ, it is small enough to pass with MET and does not warrant stent placement at this time, would consider further imaging with CT Urogram or MR Urogram to r/o malignant causes of obstruction.    Recommendations:  - Consider CT Urogram vs MR Urogram to r/o malignant obstruction of L UO  - flomax / pain control   - increase hydration to 150cc/h  - f/u urine culture, although UA appears clean   - discussed case with outpatient urologist Dr. Andres, no  intervention necessary, can admit to medicine if necessary for pain control   - outpatient  followup with Dr. Andres for further work up     D/w Dr. Satnam [facility]cd[facility]  [address] [place] [phone]

## 2024-10-28 NOTE — ED CDU PROVIDER INITIAL DAY NOTE - OBJECTIVE STATEMENT
49yo M with PMH of kidney stones with recent CT several months ago showing evidence of past kidney stone presents ED for left-sided flank pain consistent with kidney stones in the past, intermittent over the past couple of days but severe onset over the past Several hours. >Patient also endorsed some hematuria.  ED course reviewed. no leukocytosis noted. CT showing "left perinephric stranding, mild left hydronephrosis and hydroureter to the level of the urinary bladder.  No definite peritoneal plaque obstructive stone is seen however, at the left ureterovesicular junction there is mild increased density which may represent a blood clot or non-radiopaque stone." patient seen by urology. sent to cdu for analgesics, IV hydration, continued monitoring.

## 2024-10-28 NOTE — CONSULT NOTE ADULT - SUBJECTIVE AND OBJECTIVE BOX
HPI:  50yo male h/o chronic back pain, nephrolithiasis, [facility] day left flank pain. Pt states pain is similar to previous stone pain. Pain improved with ibuprofen 800mg , but then became severe with nausea/vomiting last night so came to ED. States he has very mild dysuria and and dizziness associated with the pain. Pt denies any fevers/chills, hematuria. States he has never required any surgical intervention for stones in the past. They are typically very small and "sand-like". Currently pain controlled after 4mg IV morphine, toradol and tylenol.     CT with mild left hydro to level of bladder where there in increased density material, but no clearly defined stone.       PAST MEDICAL & SURGICAL HISTORY:  Kidney stones      Kidney stones      H/O lumbar discectomy      S/P ORIF (open reduction internal fixation) fracture  of the right ankle in 2016      Fixation hardware in leg        FAMILY HISTORY:    SOCIAL HISTORY:   Tobacco hx:    MEDICATIONS  (STANDING):    MEDICATIONS  (PRN):    Allergies    No Known Allergies    Intolerances        REVIEW OF SYSTEMS: Pertinent positives and negatives as stated in HPI, otherwise negative    Vital signs  T(C): 36.4 (10-28-24 @ 05:58), Max: 36.5 (10-28-24 @ 01:37)  HR: 59 (10-28-24 @ 05:58)  BP: 114/83 (10-28-24 @ 05:58)  SpO2: 98% (10-28-24 @ 05:58)  Wt(kg): --    Output    UOP    Physical Exam  Gen: NAD  Pulm: No respiratory distress, no subcostal retractions  CV: no JVD  Abd: Soft, NT, ND, no CVAT  : voiding    LABS:          10-28 @ 01:45    WBC 10.55 / Hct 41.8  / SCr 1.47     10-28    140  |  104  |  16  ----------------------------<  96  4.4   |  25  |  1.47[H]    Ca    9.3      28 Oct 2024 01:45    TPro  7.1  /  Alb  4.2  /  TBili  0.5  /  DBili  x   /  AST  15  /  ALT  17  /  AlkPhos  103  10-28      Urinalysis Basic - ( 28 Oct 2024 04:41 )    Color: Yellow / Appearance: Clear / S.021 / pH: x  Gluc: x / Ketone: Negative mg/dL  / Bili: Negative / Urobili: 0.2 mg/dL   Blood: x / Protein: Negative mg/dL / Nitrite: Negative   Leuk Esterase: Negative / RBC: 0 /HPF / WBC 2 /HPF   Sq Epi: x / Non Sq Epi: 2 /HPF / Bacteria: Negative /HPF        Urine Cx: p      RADIOLOGY:  < from: CT Abdomen and Pelvis w/ IV Cont (10.28.24 @ 06:32) >  IMPRESSION:    There is left perinephric stranding, a delayed nephrogram and, mild left   hydronephrosis and hydroureter to the level of the urinary bladder. No   definite radiopaque obstructing stone is seen however, at the left   ureterovesicular junction there is mild increased density (602:67) which   may represent a blood clot or a nonradiopaque stone. Further evaluation   with dedicated CT urogram may be useful as clinically indicated.      < end of copied text >

## 2024-10-28 NOTE — CONSULT NOTE ADULT - NS ATTEND AMEND GEN_ALL_CORE FT
L flank pain, L hydronephrosis and AMY  Questionable hyperdensity on CT scan at UVJ/bladder.  No obvious radioopaque stone.  CT from 6/2024 also with mild hydro/fullness and no stone then    AFVSS  NAD  No CVAT at time of exam    Would obtain MR urogram to further assess  Would also send urine cytology

## 2024-10-28 NOTE — ED CDU PROVIDER INITIAL DAY NOTE - DETAILS
kidney stone:  -IV fluids  -analgesics  -pain control  -urology following   -frequent re-evaluations   -discussed with ED attending

## 2024-10-28 NOTE — ED CDU PROVIDER INITIAL DAY NOTE - WR ORDER ID 1
[id number] INR is now therapeutic. This is a quick follow-up after a supra-therapeutic INR on 4/19. Continue dose and diet until follow-up. Repeat INR in 4 weeks.

## 2024-10-28 NOTE — ED CDU PROVIDER INITIAL DAY NOTE - ATTENDING APP SHARED VISIT CONTRIBUTION OF CARE
Colton Harman DO: I have personally performed a face to face medical and diagnostic evaluation of the patient. I have discussed with and reviewed the Resident's and/or ACP's and/or Medical/PA/NP student's note and agree with the History, ROS, Physical Exam and MDM unless otherwise indicated. A brief summary of my personal evaluation and impression can be found below.     Physical exam, HPI per prior ED Provider note    MDM as above unless otherwise specified 52

## 2024-10-28 NOTE — ED ADULT NURSE NOTE - OBJECTIVE STATEMENT
patient is a 49 y/o M with PMH of kidney stones with recent CT several months ago showing evidence of past kidney stone BIBEMS for left-sided flank pain consistent with kidney stones in the past, intermittent over the past couple of days but severe onset over the past Several hours. >Patient also endorsed some hematuria. patient a&ox4, PERRL. respirations even and unlabored. abdomen soft, nondistended. skin intact. denies fevers/chills, numbness/tingling, weakness, headache, dizziness, vision changes, cp, sob, cough, dysuria, bloody stools. MD Harman at bedside to assess patient. updated on plan of care. comfort and safety maintained

## 2024-10-28 NOTE — ED PROVIDER NOTE - ATTENDING CONTRIBUTION TO CARE
Colton Harman DO: I have personally performed a face to face medical and diagnostic evaluation of the patient. I have discussed with and reviewed the Resident's and/or ACP's and/or Medical/PA/NP student's note and agree with the History, ROS, Physical Exam and MDM unless otherwise indicated. A brief summary of my personal evaluation and impression can be found below.      49yo M with PMH of kidney stones with recent CT several months ago showing evidence of past kidney stone presents ED for left-sided flank pain consistent with kidney stones in the past, intermittent over the past couple of days but severe onset over the past Several hours. >Patient also endorsed some hematuria.    CONSTITUTIONAL: Uncomfortable appearing.  SKIN: Warm dry, normal skin turgor  HEAD: NCAT  EYES: EOMI, PERRLA, no scleral icterus, conjunctiva pink  NECK: Supple; non tender. Full ROM.  CARD: RRR  RESP: No respiratory distress  ABD: Left-sided CVA tenderness left-sided flank pain left-sided suprapubic pain.  MSK: Full ROM, no leg swelling  PSYCH: Cooperative, appropriate.    History of kidney stones likely another kidney stone will get ultrasound to evaluate for hydronephrosis will give pain control symptomatic control reassess, low concern for diverticulitis or other intra-abdominal pathology given history of kidney stone and similarity to previous visitations and patient's current uncomfortable appearance.  Patient has no fevers will check UA for signs of infection suspect hematuria given patient's history.

## 2024-10-28 NOTE — ED PROVIDER NOTE - AVIAN FLU SYMPTOMS
Constitutional:  see HPI  Head:  no headache, dizziness, loss of consciousness  Eyes:  no visual changes; no eye pain, redness, or discharge  ENMT:  no ear pain or discharge; no hearing problems; no mouth or throat sores or lesions; no throat pain  Cardiac: no chest pain, tachycardia or palpitations  Respiratory: no wheezing, shortness of breath, chest tightness, or trouble breathing + cough  GI: + nausea, vomiting, diarrhea abdominal pain  :  no dysuria, frequency, or burning with urination; no change in urine output  MS: no myalgias, muscle weakness, joint pain,or  injury; no joint swelling  Neuro: no weakness; no numbness or tingling; no seizure  Skin:  no rashes or color changes; no lacerations or abrasions
No

## 2024-10-28 NOTE — ED ADULT NURSE REASSESSMENT NOTE - NS ED NURSE REASSESS COMMENT FT1
Pt received from [facility]IA[facility]. Pt oriented to CDU & plan of care was discussed. Pt A&O x 4. Pt in CDU for IV fluids, analgesics, pain control, urology following. Pt denies any abdominal pain, nausea, vomiting as of now. V/S stable, pt afebrile,  IV in place, patent and free of signs of infiltration. Pt resting in bed. Safety & comfort measures maintained. Call bell in reach. Will continue to monitor.

## 2024-10-29 DIAGNOSIS — R10.9 UNSPECIFIED ABDOMINAL PAIN: ICD-10-CM

## 2024-10-29 LAB
ALBUMIN SERPL ELPH-MCNC: 3.5 G/DL — SIGNIFICANT CHANGE UP (ref 3.3–5)
ALP SERPL-CCNC: 70 U/L — SIGNIFICANT CHANGE UP (ref 40–120)
ALT FLD-CCNC: 15 U/L — SIGNIFICANT CHANGE UP (ref 10–45)
ANION GAP SERPL CALC-SCNC: 9 MMOL/L — SIGNIFICANT CHANGE UP (ref 5–17)
AST SERPL-CCNC: 11 U/L — SIGNIFICANT CHANGE UP (ref 10–40)
BILIRUB SERPL-MCNC: 0.4 MG/DL — SIGNIFICANT CHANGE UP (ref 0.2–1.2)
BUN SERPL-MCNC: 14 MG/DL — SIGNIFICANT CHANGE UP (ref 7–23)
CALCIUM SERPL-MCNC: 8.3 MG/DL — LOW (ref 8.4–10.5)
CHLORIDE SERPL-SCNC: 109 MMOL/L — HIGH (ref 96–108)
CO2 SERPL-SCNC: 21 MMOL/L — LOW (ref 22–31)
CREAT SERPL-MCNC: 1.55 MG/DL — HIGH (ref 0.5–1.3)
CULTURE RESULTS: SIGNIFICANT CHANGE UP
EGFR: 54 ML/MIN/1.73M2 — LOW
GLUCOSE SERPL-MCNC: 96 MG/DL — SIGNIFICANT CHANGE UP (ref 70–99)
POTASSIUM SERPL-MCNC: 4.5 MMOL/L — SIGNIFICANT CHANGE UP (ref 3.5–5.3)
POTASSIUM SERPL-SCNC: 4.5 MMOL/L — SIGNIFICANT CHANGE UP (ref 3.5–5.3)
PROT SERPL-MCNC: 5.9 G/DL — LOW (ref 6–8.3)
SODIUM SERPL-SCNC: 139 MMOL/L — SIGNIFICANT CHANGE UP (ref 135–145)
SPECIMEN SOURCE: SIGNIFICANT CHANGE UP

## 2024-10-29 PROCEDURE — 99221 1ST HOSP IP/OBS SF/LOW 40: CPT

## 2024-10-29 PROCEDURE — 99233 SBSQ HOSP IP/OBS HIGH 50: CPT

## 2024-10-29 PROCEDURE — 99222 1ST HOSP IP/OBS MODERATE 55: CPT

## 2024-10-29 RX ORDER — KETOROLAC TROMETHAMINE 30 MG/ML
15 INJECTION INTRAMUSCULAR; INTRAVENOUS ONCE
Refills: 0 | Status: DISCONTINUED | OUTPATIENT
Start: 2024-10-29 | End: 2024-10-29

## 2024-10-29 RX ORDER — LORAZEPAM 2 MG
1 TABLET ORAL
Refills: 0 | Status: DISCONTINUED | OUTPATIENT
Start: 2024-10-29 | End: 2024-10-30

## 2024-10-29 RX ORDER — HYDROMORPHONE HCL/0.9% NACL/PF 6 MG/30 ML
0.5 PATIENT CONTROLLED ANALGESIA SYRINGE INTRAVENOUS EVERY 4 HOURS
Refills: 0 | Status: DISCONTINUED | OUTPATIENT
Start: 2024-10-29 | End: 2024-10-29

## 2024-10-29 RX ORDER — LORAZEPAM 2 MG
1 TABLET ORAL
Refills: 0 | Status: DISCONTINUED | OUTPATIENT
Start: 2024-10-29 | End: 2024-10-29

## 2024-10-29 RX ORDER — ONDANSETRON HYDROCHLORIDE 2 MG/ML
4 INJECTION, SOLUTION INTRAMUSCULAR; INTRAVENOUS ONCE
Refills: 0 | Status: COMPLETED | OUTPATIENT
Start: 2024-10-29 | End: 2024-10-29

## 2024-10-29 RX ORDER — SENNA 187 MG
2 TABLET ORAL AT BEDTIME
Refills: 0 | Status: DISCONTINUED | OUTPATIENT
Start: 2024-10-29 | End: 2024-10-30

## 2024-10-29 RX ORDER — HYDROMORPHONE HCL/0.9% NACL/PF 6 MG/30 ML
0.5 PATIENT CONTROLLED ANALGESIA SYRINGE INTRAVENOUS EVERY 8 HOURS
Refills: 0 | Status: DISCONTINUED | OUTPATIENT
Start: 2024-10-29 | End: 2024-10-30

## 2024-10-29 RX ORDER — SODIUM CHLORIDE 9 MG/ML
1000 INJECTION, SOLUTION INTRAMUSCULAR; INTRAVENOUS; SUBCUTANEOUS
Refills: 0 | Status: DISCONTINUED | OUTPATIENT
Start: 2024-10-29 | End: 2024-10-30

## 2024-10-29 RX ORDER — LORAZEPAM 2 MG
1 TABLET ORAL ONCE
Refills: 0 | Status: DISCONTINUED | OUTPATIENT
Start: 2024-10-29 | End: 2024-10-30

## 2024-10-29 RX ORDER — ONDANSETRON HYDROCHLORIDE 2 MG/ML
4 INJECTION, SOLUTION INTRAMUSCULAR; INTRAVENOUS EVERY 6 HOURS
Refills: 0 | Status: DISCONTINUED | OUTPATIENT
Start: 2024-10-29 | End: 2024-10-30

## 2024-10-29 RX ORDER — INFLUENZ VIR VAC TV P-SURF2003 15MCG/.5ML
0.5 SYRINGE (ML) INTRAMUSCULAR ONCE
Refills: 0 | Status: DISCONTINUED | OUTPATIENT
Start: 2024-10-29 | End: 2024-10-30

## 2024-10-29 RX ORDER — MELATONIN 5 MG
3 TABLET ORAL AT BEDTIME
Refills: 0 | Status: DISCONTINUED | OUTPATIENT
Start: 2024-10-29 | End: 2024-10-30

## 2024-10-29 RX ORDER — HYDROMORPHONE HCL/0.9% NACL/PF 6 MG/30 ML
1 PATIENT CONTROLLED ANALGESIA SYRINGE INTRAVENOUS ONCE
Refills: 0 | Status: DISCONTINUED | OUTPATIENT
Start: 2024-10-29 | End: 2024-10-29

## 2024-10-29 RX ORDER — ACETAMINOPHEN 500 MG
1000 TABLET ORAL ONCE
Refills: 0 | Status: COMPLETED | OUTPATIENT
Start: 2024-10-29 | End: 2024-10-29

## 2024-10-29 RX ORDER — DIPHENHYDRAMINE HCL 12.5MG/5ML
25 ELIXIR ORAL ONCE
Refills: 0 | Status: DISCONTINUED | OUTPATIENT
Start: 2024-10-29 | End: 2024-10-29

## 2024-10-29 RX ORDER — GABAPENTIN 300 MG/1
100 CAPSULE ORAL AT BEDTIME
Refills: 0 | Status: DISCONTINUED | OUTPATIENT
Start: 2024-10-29 | End: 2024-10-30

## 2024-10-29 RX ORDER — CEFTRIAXONE SODIUM 10 G
1000 VIAL (EA) INJECTION EVERY 24 HOURS
Refills: 0 | Status: DISCONTINUED | OUTPATIENT
Start: 2024-10-29 | End: 2024-10-30

## 2024-10-29 RX ORDER — HYDROMORPHONE HCL/0.9% NACL/PF 6 MG/30 ML
0.5 PATIENT CONTROLLED ANALGESIA SYRINGE INTRAVENOUS ONCE
Refills: 0 | Status: DISCONTINUED | OUTPATIENT
Start: 2024-10-29 | End: 2024-10-29

## 2024-10-29 RX ADMIN — Medication 0.5 MILLIGRAM(S): at 03:09

## 2024-10-29 RX ADMIN — ONDANSETRON HYDROCHLORIDE 4 MILLIGRAM(S): 2 INJECTION, SOLUTION INTRAMUSCULAR; INTRAVENOUS at 22:16

## 2024-10-29 RX ADMIN — ONDANSETRON HYDROCHLORIDE 4 MILLIGRAM(S): 2 INJECTION, SOLUTION INTRAMUSCULAR; INTRAVENOUS at 10:08

## 2024-10-29 RX ADMIN — ONDANSETRON HYDROCHLORIDE 4 MILLIGRAM(S): 2 INJECTION, SOLUTION INTRAMUSCULAR; INTRAVENOUS at 18:03

## 2024-10-29 RX ADMIN — Medication 400 MILLIGRAM(S): at 09:33

## 2024-10-29 RX ADMIN — Medication 0.5 MILLIGRAM(S): at 18:43

## 2024-10-29 RX ADMIN — Medication 400 MILLIGRAM(S): at 16:11

## 2024-10-29 RX ADMIN — Medication 2 TABLET(S): at 21:16

## 2024-10-29 RX ADMIN — SODIUM CHLORIDE 100 MILLILITER(S): 9 INJECTION, SOLUTION INTRAMUSCULAR; INTRAVENOUS; SUBCUTANEOUS at 08:14

## 2024-10-29 RX ADMIN — Medication 100 MILLIGRAM(S): at 19:18

## 2024-10-29 RX ADMIN — ONDANSETRON HYDROCHLORIDE 4 MILLIGRAM(S): 2 INJECTION, SOLUTION INTRAMUSCULAR; INTRAVENOUS at 10:56

## 2024-10-29 RX ADMIN — KETOROLAC TROMETHAMINE 15 MILLIGRAM(S): 30 INJECTION INTRAMUSCULAR; INTRAVENOUS at 03:05

## 2024-10-29 RX ADMIN — ONDANSETRON HYDROCHLORIDE 4 MILLIGRAM(S): 2 INJECTION, SOLUTION INTRAMUSCULAR; INTRAVENOUS at 02:50

## 2024-10-29 RX ADMIN — Medication 0.5 MILLIGRAM(S): at 17:35

## 2024-10-29 RX ADMIN — KETOROLAC TROMETHAMINE 15 MILLIGRAM(S): 30 INJECTION INTRAMUSCULAR; INTRAVENOUS at 02:43

## 2024-10-29 RX ADMIN — Medication 1 MILLIGRAM(S): at 10:08

## 2024-10-29 RX ADMIN — SODIUM CHLORIDE 70 MILLILITER(S): 9 INJECTION, SOLUTION INTRAMUSCULAR; INTRAVENOUS; SUBCUTANEOUS at 16:13

## 2024-10-29 RX ADMIN — Medication 3 MILLIGRAM(S): at 21:16

## 2024-10-29 RX ADMIN — Medication 0.5 MILLIGRAM(S): at 03:46

## 2024-10-29 NOTE — PATIENT PROFILE ADULT - NSPROGENOTHERPROVIDER_GEN_A_NUR
[facility]        NAME: Rima Le is a 13 y o  female  : 2006    MRN: [id number]  DATE: 2018  TIME: 9:27 PM    Assessment and Plan   Acute diffuse otitis externa of left ear [H60 312]  1  Acute diffuse otitis externa of left ear  neomycin-polymyxin-hydrocortisone (CORTISPORIN) 0 35%-10,000 units/mL-1% otic suspension         Patient Instructions     Patient Instructions       Otitis Externa   AMBULATORY CARE:   Otitis externa , or swimmer's ear, is an infection in the outer ear canal  This canal goes from the outside of the ear to the eardrum  Common signs and symptoms include the following:   · Ear pain    · Outer ear canal is red and swollen    · Clear fluid or pus is leaking out of your ear    · Outer ear canal is itchy and you see a rash    · Trouble hearing because your ear is plugged    · Feel a bump in your ear canal, called a polyp    · Flakes of skin fall from your ear  Seek care immediately if:   · You have severe ear pain  · You are suddenly unable to hear at all  · You have new swelling in your face, behind your ears, or in your neck  · You suddenly cannot move part of your face  · Your face suddenly feels numb  Contact your healthcare provider if:   · You have a fever  · Your signs and symptoms do not get better after 2 days of treatment  · Your signs and symptoms go away for a time, but then come back  · You have questions or concerns about your condition or care  Treatment for otitis externa  may include any of the following:  · NSAIDs , such as ibuprofen, help decrease swelling, pain, and fever  This medicine is available with or without a doctor's order  NSAIDs can cause stomach bleeding or kidney problems in certain people  If you take blood thinner medicine, always ask if NSAIDs are safe for you  Always read the medicine label and follow directions   Do not give these medicines to children under 10months of age without direction from your child's healthcare provider  · Acetaminophen  decreases pain and fever  It is available without a doctor's order  Ask how much to take and how often to take it  Follow directions  Acetaminophen can cause liver damage if not taken correctly  · Ear drops  that contain an antibiotic may be given  The antibiotic helps treat a bacterial infection  You may also be given steroid medicine  The steroid helps decrease redness, swelling, and pain  · Ear wicking  removes fluid or wax from your outer ear canal  Healthcare providers may insert a small tube, called a wick, into your ear to help drain fluid  A wick also may be used to put medicine into your ear canal if the canal is blocked  Follow the steps below to use eardrops:   · Lie down on your side with your infected ear facing up  · Carefully drip the correct number of eardrops into your ear  Have another person help you if possible  · Gently move the outside part of your ear back and forth to help the medicine reach your ear canal      · Stay lying down in the same position (with your ear facing up) for 3 to 5 minutes  Prevent otitis externa:   · Do not put cotton swabs or foreign objects in your ears  · Wrap a clean moist washcloth around your finger, and use it to clean your outer ear and remove extra ear wax  · Use ear plugs when you swim  Dry your outer ears completely after you swim or bathe  Follow up with your healthcare provider as directed:  Write down your questions so you remember to ask them during your visits  © 2017 [employer] Information is for End User's use only and may not be sold, redistributed or otherwise used for commercial purposes  All illustrations and images included in CareNotes® are the copyrighted property of [employer] , [employer]  or [employer]  The above information is an  only  It is not intended as medical advice for individual conditions or treatments   Talk to your doctor, nurse or pharmacist before following any medical regimen to see if it is safe and effective for you  Carbamide Peroxide (Into the ear)   Carbamide Peroxide (KARLY-ba-mide per-OX-maria r)  Used to soften, loosen, and remove excess wax from your ears  Brand Name(s): Audiologist's Choice, Debrox, E-R-O Ear Drops, Ear Drops, Ear Wax Drops, Earwax Treatment, [facility] Ear Drops, [employer] [facility] Ear System, Good Sense Ear Wax Removal, Good Sense Ear Wax Removal Kit, Sam's ProRinse Earwax Removal Kit, Sam's Wax Away Earwax Removal Aid, Sam's Wax Away Earwax Removal System, Mollifene, Murine Ear   There may be other brand names for this medicine  When This Medicine Should Not Be Used: You should not use this medicine if you have had an allergic reaction to carbamide peroxide  You should not use this medicine if you have a punctured eardrum, or discharge from your ear  You should not use this medicine if you have had an ear surgery, or if you have pain, irritation, or rash in your ear  How to Use This Medicine:   Liquid, Drop  · Your doctor will tell you how much medicine to use  Do not use more than directed  · Follow the instructions on the medicine label if you are using this medicine without a prescription  · Remove your hearing aid while using this medicine  · Use this medicine only in your ear  · Wash your hands with soap and water before and after using this medicine  · You may warm the drops by holding the unopened bottle in your hands for a few minutes  · Remove the cap  Do not let the tip of the dropper touch anything, including your ear  · Lie down or tilt your head to the side  For a child, gently pull the child's earlobe down and back to straighten the child's ear canal  For an adult, gently pull the earlobe up and back to straighten the ear canal   · Drop the prescribed number of drops into the ear   Keep the ear tilted up for a few minutes or put a cotton ball into your ear   · You may hear a bubbling noise in your ear after placing the drop in it  This is normal and is not something to worry about  · Do not rinse the dropper  · After using this medicine 4 days, gently flush your ear with warm water, using a soft bulb syringe to remove the wax  If a dose is missed:   · You must use this medicine on a fixed schedule  Call your doctor or pharmacist if you miss a dose  How to Store and Dispose of This Medicine:   · Keep the bottle closed when you are not using it  Store it at room temperature, away from light and heat  Do not freeze  · Ask your pharmacist, doctor, or health caregiver about the best way to dispose of any outdated medicine or medicine no longer needed  · Keep all medicine out of the reach of children  Never share your medicine with anyone  Drugs and Foods to Avoid:   Ask your doctor or pharmacist before using any other medicine, including over-the-counter medicines, vitamins, and herbal products  · You should not use other ear medicines while using this medicine, unless your doctor tells you to  Warnings While Using This Medicine:   · Avoid getting this medicine in your eyes  If the medicine does get in your eyes, flush them with water and call your doctor right away  · This medicine should not be given to children under 15years of age without a doctor's approval   · Call your doctor if your symptoms do not improve or if they get worse  · Do not use this medicine for longer than 4 days  Possible Side Effects While Using This Medicine:   Call your doctor right away if you notice any of these side effects:  · Allergic reaction: Itching or hives, swelling in your face or hands, swelling or tingling in your mouth or throat, chest tightness, trouble breathing  If you notice other side effects that you think are caused by this medicine, tell your doctor  Call your doctor for medical advice about side effects   You may report side effects to FDA at 1-800-FDA-1088  © 2017 [employer] Information is for End User's use only and may not be sold, redistributed or otherwise used for commercial purposes  The above information is an  only  It is not intended as medical advice for individual conditions or treatments  Talk to your doctor, nurse or pharmacist before following any medical regimen to see if it is safe and effective for you  Follow up with PCP in 3-5 days  Proceed to  ER if symptoms worsen  Chief Complaint     Chief Complaint   Patient presents with   Casie Ramirez     left ear pain since last friday         History of Present Illness       Patient with left ear pain for the past 2 days  She has been swimming a lot  Her mother has been treating her with left over amoxicillin without symptomatic improvement        Review of Systems   Review of Systems   Constitutional: Negative for chills and fever  HENT: Positive for ear pain  Negative for ear discharge, rhinorrhea, sinus pressure, sore throat, tinnitus, trouble swallowing and voice change  Respiratory: Negative for cough, chest tightness, shortness of breath and wheezing  Neurological: Negative for dizziness and light-headedness           Current Medications       Current Outpatient Prescriptions:     neomycin-polymyxin-hydrocortisone (CORTISPORIN) 0 35%-10,000 units/mL-1% otic suspension, Administer 4 drops into the left ear 4 (four) times a day, Disp: 10 mL, Rfl: 0    Current Allergies     Allergies as of 07/30/2018    (No Known Allergies)            The following portions of the patient's history were reviewed and updated as appropriate: allergies, current medications, past family history, past medical history, past social history, past surgical history and problem list      Past Medical History:   Diagnosis Date    Known health problems: none        Past Surgical History:   Procedure Laterality Date    NO PAST SURGERIES         No family history on file       Medications have been verified  Objective   BP (!) 120/69   Pulse 64   Temp 98 6 °F (37 °C) (Tympanic)   Resp 16   Ht 5' 4" (1 626 m)   Wt 54 4 kg (120 lb)   SpO2 100%   BMI 20 60 kg/m²        Physical Exam     Physical Exam   Constitutional: She is oriented to person, place, and time  She appears well-developed and well-nourished  No distress  HENT:   Head: Normocephalic and atraumatic  Right Ear: Tympanic membrane normal    Left Ear: Tympanic membrane normal    Nose: Mucosal edema present  Mouth/Throat: Posterior oropharyngeal erythema present  No oropharyngeal exudate or tonsillar abscesses  Left ear canal red swollen tender  Eyes: Conjunctivae are normal  Pupils are equal, round, and reactive to light  Neck: Normal range of motion  Neck supple  Cardiovascular: Normal rate and regular rhythm  Pulmonary/Chest: Effort normal and breath sounds normal    Abdominal: Soft  Bowel sounds are normal    Neurological: She is alert and oriented to person, place, and time  Skin: Skin is warm and dry  Nursing note and vitals reviewed  none

## 2024-10-29 NOTE — ED ADULT NURSE REASSESSMENT NOTE - NSFALLUNIVINTERV_ED_ALL_ED
Bed/Stretcher in lowest position, wheels locked, appropriate side rails in place/Call bell, personal items and telephone in reach/Instruct patient to call for assistance before getting out of bed/chair/stretcher/Non-slip footwear applied when patient is off stretcher/[place] to call system/Physically safe environment - no spills, clutter or unnecessary equipment/Purposeful proactive rounding/Room/bathroom lighting operational, light cord in reach

## 2024-10-29 NOTE — H&P ADULT - NSHPPHYSICALEXAM_GEN_ALL_CORE
T(C): 36.8 (10-29-24 @ 14:06), Max: 36.8 (10-29-24 @ 14:06)  HR: 62 (10-29-24 @ 14:06) (56 - 69)  BP: 150/94 (10-29-24 @ 14:06) (104/67 - 150/94)  RR: 18 (10-29-24 @ 14:06) (18 - 20)  SpO2: 96% (10-29-24 @ 14:06) (96% - 99%)  GENERAL: NAD, lying in bed comfortably  HEAD:  Atraumatic, normocephalic  EYES: EOMI, PERRLA, conjunctiva and sclera clear  NECK: Supple, trachea midline, no JVD  HEART: Regular rate and rhythm, no murmurs, rubs, or gallops  LUNGS: Unlabored respirations.  Clear to auscultation bilaterally, no crackles, wheezing, or rhonchi  ABDOMEN: Soft, nontender, nondistended, +BS  EXTREMITIES: 2+ peripheral pulses bilaterally. No clubbing, cyanosis, or edema  NERVOUS SYSTEM:  A&Ox3, moving all extremities, no focal deficits   SKIN: No rashes or lesions    +  flank  tenderness

## 2024-10-29 NOTE — H&P ADULT - NSHPLABSRESULTS_GEN_ALL_CORE
LABS:                        14.5   10.55 )-----------( 189      ( 28 Oct 2024 01:45 )             41.8     10-29    139  |  109[H]  |  14  ----------------------------<  96  4.5   |  21[L]  |  1.55[H]    Ca    8.3[L]      29 Oct 2024 08:25    TPro  5.9[L]  /  Alb  3.5  /  TBili  0.4  /  DBili  x   /  AST  11  /  ALT  15  /  AlkPhos  70  10-29          Urinalysis Basic - ( 29 Oct 2024 08:25 )    Color: x / Appearance: x / SG: x / pH: x  Gluc: 96 mg/dL / Ketone: x  / Bili: x / Urobili: x   Blood: x / Protein: x / Nitrite: x   Leuk Esterase: x / RBC: x / WBC x   Sq Epi: x / Non Sq Epi: x / Bacteria: x          10-28 @ 01:38  4.5  50

## 2024-10-29 NOTE — ED CDU PROVIDER SUBSEQUENT DAY NOTE - PROGRESS NOTE DETAILS
CDU PROGRESS NOTE NICOLE GOLDBERG: pt having moderate pain difficulty sleeping, ofirmev helped earlier. with shane would prefer to avoid nsaids, pt declined morphine requests toradol states its the only thing that helped. explained shane and risk of worsening with multiple doses, will give 1 dose but likely no more after this CDU PROGRESS NOTE NICOLE GOLDBERG: per RN Sisnasir pt refused 6am labs, wants to sleep. Will obtain at 8am FERNANDO Sweet MD: Pt resting comfortably. Awaiting Urology at this time. CDU NOTE NICOLE Sellers: pt resting comfortably, feels well, mild L flank discomfort, improved after medication last night. no other complaints. pt states he is not amenable to any procedure. pt wants to see how he feels by afternoon and if still improved then comfortable going home. NAD VSS.   pt to get breakfast, will see how he tolerates. Also will f/up repeat bmp. CDU NOTE NICOLE Sellers: pt resting, has had waxing and waning of severe L flank pain requiring IV narcotic pain medication. pt also with N/V. pt seen by Urology- recommending medicine admission, will follow from Urology standpoint. Recommending further work-up with either CT urogram (however, pt's creatinine elevated) or MRI. Will put in their recs.   Spoke to patient, requesting admission to Dr. Bria Powers.   Spoke with Dr. Powers, knows patient well, will admit to her service. CDU NOTE NICOLE Sellers: pt resting, has had waxing and waning of severe L flank pain requiring IV narcotic pain medication. pt also with N/V. pt seen by Urology- recommending medicine admission, will follow from Urology standpoint. Recommending further work-up with either CT urogram (however, pt's creatinine elevated) or MRI. Will put in their recs.   Spoke to patient, requesting admission to Dr. Bria Powers.   Spoke with Dr. Powers, can admit to her service.

## 2024-10-29 NOTE — ED CDU PROVIDER DISPOSITION NOTE - NSFOLLOWUPINSTRUCTIONS_ED_ALL_ED_FT
Please follow up with your primary care doctor within 1 week.   Follow up with urology within 1-5 days (see attached contact info).  *Bring all printed lab/test results to your appointment(s).*    Stay well hydrated with water and electrolyte replacement solutions such as Pedialyte or the adult equivalent.     Strain urine.  Take Flomax 0.4mg daily.    For persistent pain:  Take Tylenol 650mg every 6 hrs as needed for pain.  Take Motrin 400-600mg every 6 hrs as needed for pain. Take with food    Return to the ED for worsening pain, inability to tolerate food/drink without vomiting, fever, chills, or any other concerns.

## 2024-10-29 NOTE — ED ADULT NURSE REASSESSMENT NOTE - NS ED NURSE REASSESS COMMENT FT1
At 02:43hrs, patient c/o severe pain. Toradol given with no relief. NICOLE Gastelmu notified. IV Dilaudid given with good relief. Patient c/o nausea, Zofran given with some relief.

## 2024-10-29 NOTE — ED ADULT NURSE REASSESSMENT NOTE - NSFALLRISKFACTORS_ED_ALL_ED
Other (Free Text): Touch-up at no charge, patient paid cosmetic fee last visit. \\n\\nThe patient's consent was obtained including but not limited to risks of crusting, scabbing, blistering, scarring, darker or lighter pigmentary change, recurrence, incomplete removal and infection. I reviewed with the patient in detail post-care instructions. Patient is to wear sun protection, and avoid picking at any of the treated lesions. Pt may apply Vaseline to crusted or scabbing areas.\\n\\nToday is a cosmetic visit\\n Note Text (......Xxx Chief Complaint.): This diagnosis correlates with the Detail Level: Zone No indicators present

## 2024-10-29 NOTE — ED ADULT NURSE REASSESSMENT NOTE - NS ED NURSE REASSESS COMMENT FT1
Pt received from DIAZ Mckeon. Pt oriented to CDU & plan of care was discussed. Pt A&O x 4. Pt in CDU for IV fluids, analgesics, pain control, urology. Pt denies any flank pain, abdominal pain, nausea. V/S stable, pt afebrile,  IV in place, patent and free of signs of infiltration. Pt resting in bed. Safety & comfort measures maintained. Call bell in reach. Will continue to monitor.

## 2024-10-29 NOTE — ED ADULT NURSE REASSESSMENT NOTE - NS ED NURSE REASSESS COMMENT FT1
Pt received from DIAZ Arenas at 19:00hrs. Pt A&O x 4. Pt in CDU for IV fluids and pain control. IV Tylenol given for pain with good relief. One dose of IV ceftriaxone given as ordered. Patient refused Flomax.   NICOLE Gastelum notified. Pt denies any chest pain, SOB, dizziness or palpitations. V/S stable, IV site patent and free of signs of infiltration. Pt resting in bed. Safety & comfort measures maintained. Call bell in reach. Will continue to monitor.

## 2024-10-29 NOTE — ED CDU PROVIDER SUBSEQUENT DAY NOTE - HISTORY
CDU PROGRESS NOTE NICOLE GOLDBERG: no interval change. pt reported 7/10 flank pain earlier improved from iv tylenol. NAD. VSS. pt declined to take flomax. pt was seen by Dr Villarreal recommended dose of IV rocephin given read of stranding/possible pyelo on CT. Will continue to monitor.

## 2024-10-29 NOTE — H&P ADULT - HISTORY OF PRESENT ILLNESS
50yo male       h/o chronic back pain, nephrolithiasis         presenting to [facility] with 1 day left flank pain. Pt states pain is similar to previous stone pain. /   improved with ibuprofen 800mg , but then became severe with nausea/vomiting l     very mild dysuria and and dizziness associated with the pain.       denies any fevers/chills, hematuria./    has never required any surgical intervention for stones in the past. ,    typically very small and "sand-like".       recvd  4mg IV morphine, toradol and tylenol.       CT    mild left hydro to level of bladder where there in increased density material, but no clearly defined stone.    48yo male       h/o chronic back pain, nephrolithiasis     asked  by  pt  and  family  to  care  for  pt         presenting to [facility] with 1 day left flank pain. Pt states pain is similar to previous stone pain. /   improved with ibuprofen 800mg , but then became severe with nausea/vomiting l     very mild dysuria and and dizziness associated with the pain.       denies any fevers/chills, hematuria./    has never required any surgical intervention for stones in the past. ,    typically very small and "sand-like".       recvd  4mg IV morphine, toradol and tylenol.       CT    mild left hydro to level of bladder where there in increased density material, but no clearly defined stone.

## 2024-10-29 NOTE — ED CDU PROVIDER DISPOSITION NOTE - CLINICAL COURSE
47yo M with PMH of kidney stones with recent CT several months ago showing evidence of past kidney stone presents ED for left-sided flank pain consistent with kidney stones in the past, intermittent over the past couple of days but severe onset over the past Several hours. >Patient also endorsed some hematuria.  ED course reviewed. no leukocytosis noted. CT showing "left perinephric stranding, mild left hydronephrosis and hydroureter to the level of the urinary bladder.  No definite peritoneal plaque obstructive stone is seen however, at the left ureterovesicular junction there is mild increased density which may represent a blood clot or non-radiopaque stone." patient seen by urology. sent to cdu for analgesics, IV hydration, continued monitoring.  in cdu, 49yo M with PMH of kidney stones with recent CT several months ago showing evidence of past kidney stone presents ED for left-sided flank pain consistent with kidney stones in the past, intermittent over the past couple of days but severe onset over the past Several hours. >Patient also endorsed some hematuria.  ED course reviewed. no leukocytosis noted. CT showing "left perinephric stranding, mild left hydronephrosis and hydroureter to the level of the urinary bladder.  No definite peritoneal plaque obstructive stone is seen however, at the left ureterovesicular junction there is mild increased density which may represent a blood clot or non-radiopaque stone." patient seen by urology. sent to cdu for analgesics, IV hydration, continued monitoring.  in cdu, pt with continued waxing and waning of severe L side flank pain associated with N/V, improves with dilaudid. pt still requiring IV pain medications. pt does not want any procedure/stent. pt seen by Urology attending, recommending admission to medicine for pain control and further work-up as there is no definite stone on imaging.

## 2024-10-29 NOTE — H&P ADULT - ASSESSMENT
50yo male       h/o chronic back pain, nephrolithiasis/ prior   foot  drop.  s/p  laminectomy     c/o    day left flank pain. Pt states pain is similar to previous stone pain. /   improved with ibuprofen 800mg , but then became severe with nausea/vomiting l     very mild dysuria and and dizziness associated with the pain.       denies any fevers/chills, hematuria./    has never required any surgical intervention for stones in the past. ,    typically very small and "sand-like".       recvd  4mg IV morphine, toradol and tylenol.         c/o   flank  pain.  was  in  cdu           now  admitted   by  er.  has   worsening  pain       suspect    kidney  stone/  L  pyelo         ct  , noted,  left  hydr/  pyelo        follow  cx/  ID  eval      seen  by   urology/  MRI       iv  fluids/  pain  meds         < from: CT Abdomen and Pelvis w/ IV Cont (10.28.24 @ 06:32) >  MPRESSION:  There is left perinephric stranding, a delayed nephrogram and, mild left   hydronephrosis and hydroureter to the level of the urinary bladder. No   definite radiopaque obstructing stone is seen however, at the left   ureterovesicular junction there is mild increased density (602:67) which   may represent a blood clot or a nonradiopaque stone. Further evaluation   with dedicated CT urogram may be useful as clinically indicated.  --- End of Report ---    < 48yo male       h/o chronic back pain, nephrolithiasis/ prior   foot  drop.  s/p  laminectomy     c/o    day left flank pain. Pt states pain is similar to previous stone pain. /   improved with ibuprofen 800mg , but then became severe with nausea/vomiting l     very mild dysuria and and dizziness associated with the pain.       denies any fevers/chills, hematuria./    has never required any surgical intervention for stones in the past. ,    typically very small and "sand-like".       recvd  4mg IV morphine, toradol and tylenol.         c/o   flank  pain.  was  in  cdu        now  admitted   by  er.    with   worsening  pain         suspect    kidney  stone/  L  pyelo .  surpeisingly  u/a  is  realtively  bland        ct  , noted,  left  hydro/    pyelo/  density  at  L  UV  jnt    may  be  stone  vs  clot        follow  cx/  ID  eval    AMY,  on iv  fluids       seen  by   urology/  MRI         iv  fluids/  pain  meds         < from: CT Abdomen and Pelvis w/ IV Cont (10.28.24 @ 06:32) >  MPRESSION:  There is left perinephric stranding, a delayed nephrogram and, mild left   hydronephrosis and hydroureter to the level of the urinary bladder. No   definite radiopaque obstructing stone is seen however, at the left   ureterovesicular junction there is mild increased density (602:67) which   may represent a blood clot or a nonradiopaque stone. Further evaluation   with dedicated CT urogram may be useful as clinically indicated.  --- End of Report ---    <

## 2024-10-29 NOTE — CONSULT NOTE ADULT - ASSESSMENT
Radiographically/clinically seems like renal colic with pyelonephritis, though u/a bland  No urology intervention planned at present    Cotninue ceftriaxone  F/U Cx    D/W Dr. Powers    Thank you for the courtesy of this referral.  Greg Benjamin MD  Attending Physician  [facility]  Division of Infectious Diseases  [phone]

## 2024-10-29 NOTE — H&P ADULT - NSHPREVIEWOFSYSTEMS_GEN_ALL_CORE
REVIEW OF SYSTEMS:  CONSTITUTIONAL: No fever,  no  weight loss  ENT:  No  tinnitus,   no   vertigo  NECK: No pain or stiffness  RESPIRATORY: No cough, wheezing, chills or hemoptysis;    No Shortness of Breath  CARDIOVASCULAR: No chest pain, palpitations, dizziness  GASTROINTESTINAL: No abdominal or epigastric pain. No nausea, vomiting, or hematemesis; No diarrhea  No melena or hematochezia.    +  flank  pain  GENITOURINARY: No dysuria, frequency, hematuria, or incontinence  NEUROLOGICAL: No headaches  SKIN: No itching,  no   rash  LYMPH Nodes: No enlarged glands  ENDOCRINE: No heat or cold intolerance  MUSCULOSKELETAL: No joint pain or swelling  PSYCHIATRIC: No depression, anxiety  HEME/LYMPH: No easy bruising, or bleeding gums  ALLERGY AND IMMUNOLOGIC: No hives or eczema

## 2024-10-29 NOTE — CONSULT NOTE ADULT - SUBJECTIVE AND OBJECTIVE BOX
[facility]  Division of Infectious Diseases  [phone]    CAROLANN RAMIRES  49y, Male  [id number]    HPI--  49M with chronic back pain following trauma, nephrolithiasis, hypokalemia (resolved), renal insufficiency, presents with a 1-day history of renal colic with pain severe to the point where he syncopized in his bathroom and asked his wife to call 911. Had subjective fevers and chills. Poor appetite with nausea. Pain feels somewhat different than his usual renal colic- most recently had been on the left- prior episodes on the right. CT here without any definite radioopaque stone. Possible clot. No hx of gout. Patient denies any recent antibiotics.         PMH/PSH--  No pertinent past medical history    Kidney stones    Kidney stones    No significant past surgical history    H/O lumbar discectomy    S/P ORIF (open reduction internal fixation) fracture    Fixation hardware in leg        Allergies--  No Known Allergies      Medications--  Antibiotics: cefTRIAXone   IVPB 1000 milliGRAM(s) IV Intermittent every 24 hours    Immunologic:   Other: gabapentin  HYDROmorphone  Injectable PRN  LORazepam     Tablet  melatonin.  senna  sodium chloride 0.9%.  tamsulosin    Antimicrobials last 90 days per EMR: MEDICATIONS  (STANDING):  cefTRIAXone   IVPB   100 mL/Hr IV Intermittent (10-28-24 @ 21:51)        Social History--  EtOH: denies   Tobacco: denies   Drug Use: denies     Family/Marital History--  No pertinent family history in first degree relatives          Travel/Environmental/Occupational History:      Review of Systems:  A >=10-point review of systems was obtained.     Pertinent positives and negatives--  Constitutional: No fevers. No Chills. No Rigors.   Eyes:  ENMT:  Cardiovascular: No chest pain. No palpitations.  Respiratory: No shortness of breath. No cough.  Gastrointestinal: No nausea or vomiting. No diarrhea or constipation.   Genitourinary:  Musculoskeletal:  Skin:  Neurologic:  Psychiatric: Pleasant. Appropriate affect.  Endocrine:  Heme/Lymphatic:  Allergy/Immunologic:    Review of systems otherwise negative except as previously noted.    Physical Exam--  Vital Signs: T(F): 98 (10-29-24 @ 15:18), Max: 98.2 (10-29-24 @ 14:06)  HR: 53 (10-29-24 @ 15:18)  BP: 120/86 (10-29-24 @ 15:18)  RR: 16 (10-29-24 @ 15:18)  SpO2: 97% (10-29-24 @ 15:18)  Wt(kg): --  General: Nontoxic-appearing Male in no acute distress.  HEENT: AT/NC. PERRL. EOMI. Anicteric. Conjunctiva pink and moist. Oropharynx clear. Dentition fair.  Neck: Not rigid. No sense of mass.  Nodes: None palpable.  Lungs: Clear bilaterally without rales, wheezing or rhonchi  Heart: Regular rate and rhythm. No Murmur. No rub. No gallop. No palpable thrill.  Abdomen: Bowel sounds present and normoactive. Soft. Nondistended. Nontender. No sense of mass. No organomegaly.  Back: No spinal tenderness. No costovertebral angle tenderness.   Extremities: No cyanosis or clubbing. No edema.   Skin: Warm. Dry. Good turgor. No rash. No vasculitic stigmata.  Psychiatric: Appropriate affect and mood for situation.         Laboratory & Imaging Data--  CBC                        14.5   10.55 )-----------( 189      ( 28 Oct 2024 01:45 )             41.8       Chemistries  10-29    139  |  109[H]  |  14  ----------------------------<  96  4.5   |  21[L]  |  1.55[H]    Ca    8.3[L]      29 Oct 2024 08:25    TPro  5.9[L]  /  Alb  3.5  /  TBili  0.4  /  DBili  x   /  AST  11  /  ALT  15  /  AlkPhos  70  10-29      Culture Data       [facility]  Division of Infectious Diseases  [phone]    CAROLANN RAMIRES  49y, Male  [id number]    HPI--  49M with chronic back pain following trauma, nephrolithiasis, hypokalemia (resolved), renal insufficiency, presents with a 1-day history of renal colic with pain severe to the point where he syncopized in his bathroom and asked his wife to call 911. Had subjective fevers and chills. Poor appetite with nausea. Pain feels somewhat different than his usual renal colic- most recently had been on the left- prior episodes on the right. CT here without any definite radioopaque stone. Possible clot. No hx of gout. Patient denies any recent antibiotics.         PMH/PSH--  No pertinent past medical history    Kidney stones    Kidney stones    No significant past surgical history    H/O lumbar discectomy    S/P ORIF (open reduction internal fixation) fracture    Fixation hardware in leg        Allergies--  No Known Allergies      Medications--  Antibiotics: cefTRIAXone   IVPB 1000 milliGRAM(s) IV Intermittent every 24 hours    Immunologic:   Other: gabapentin  HYDROmorphone  Injectable PRN  LORazepam     Tablet  melatonin.  senna  sodium chloride 0.9%.  tamsulosin    Antimicrobials last 90 days per EMR: MEDICATIONS  (STANDING):  cefTRIAXone   IVPB   100 mL/Hr IV Intermittent (10-28-24 @ 21:51)        Social History--  EtOH: denies   Tobacco: prior  Drug Use: denies     Family/Marital History--    3 children  No pertinent family history in first degree relatives          Travel/Environmental/Occupational History:  No recent travel  Manages medical practice    Review of Systems:  A >=10-point review of systems was obtained.   Review of systems otherwise negative except as previously noted.    Physical Exam--  Vital Signs: T(F): 98 (10-29-24 @ 15:18), Max: 98.2 (10-29-24 @ 14:06)  HR: 53 (10-29-24 @ 15:18)  BP: 120/86 (10-29-24 @ 15:18)  RR: 16 (10-29-24 @ 15:18)  SpO2: 97% (10-29-24 @ 15:18)  Wt(kg): --  General: Nontoxic-appearing Male in no acute distress.  HEENT: AT/NC. Anicteric. Conjunctiva pink and moist. Oropharynx clear.  Neck: Not rigid. No sense of mass.  Nodes: None palpable.  Lungs: Clear bilaterally without rales, wheezing or rhonchi  Heart: Regular rate and rhythm.   Abdomen: Bowel sounds present and normoactive. Soft. Nondistended. Nontender.   Back: No spinal tenderness. No costovertebral angle tenderness.   Extremities: No cyanosis or clubbing. No edema.   Skin: Warm. Dry. Good turgor. No rash. No vasculitic stigmata.  Psychiatric: Appropriate affect and mood for situation.         Laboratory & Imaging Data--  CBC                        14.5   10.55 )-----------( 189      ( 28 Oct 2024 01:45 )             41.8       Chemistries  10-29    139  |  109[H]  |  14  ----------------------------<  96  4.5   |  21[L]  |  1.55[H]    Ca    8.3[L]      29 Oct 2024 08:25    TPro  5.9[L]  /  Alb  3.5  /  TBili  0.4  /  DBili  x   /  AST  11  /  ALT  15  /  AlkPhos  70  10-29    Urinalysis + Microscopic Examination (10.28.24 @ 04:41)    pH Urine: 5.5   Urine Appearance: Clear   Color: Yellow   Specific Gravity: 1.021   Protein, Urine: Negative mg/dL   Glucose Qualitative, Urine: Negative mg/dL   Ketone - Urine: Negative mg/dL   Blood, Urine: Negative   Bilirubin: Negative   Urobilinogen: 0.2 mg/dL   Leukocyte Esterase Concentration: Negative   Nitrite: Negative   White Blood Cell - Urine: 2 /HPF   Red Blood Cell - Urine: 0 /HPF   Bacteria: Negative /HPF   Cast: 0 /LPF   Epithelial Cells: 2 /HPF      Culture Data  None    < from: CT Abdomen and Pelvis w/ IV Cont (10.28.24 @ 06:32) >    ACC: [id number] EXAM:  CT ABDOMEN AND PELVIS IC   ORDERED BY:  MARVA RUBIO     PROCEDURE DATE:  10/28/2024          INTERPRETATION:  CLINICAL INFORMATION: Left-sided abdominal pain    COMPARISON: CT abdomen and pelvis 6/26/2024.    CONTRAST/COMPLICATIONS:  IV Contrast: Omnipaque 350  90 cc administered   10 cc discarded  Oral Contrast: NONE  Complications: None reported at time of study completion    PROCEDURE:  CT of the Abdomen and Pelvis was performed.  Sagittal and coronal reformats were performed.    FINDINGS:  LOWER CHEST: Within normal limits.    LIVER: Within normal limits.  BILE DUCTS: Normal caliber.  GALLBLADDER: Within normal limits.  SPLEEN: Within normal limits.  PANCREAS: Within normal limits.  ADRENALS: Within normal limits.  KIDNEYS/URETERS: The right kidney is normal. There is left perinephric   stranding, a delayed nephrogram and, mild left hydronephrosis and   hydroureter to the level of the urinary bladder. No definite radiopaque   obstructing stone is seen however, at the left ureterovesicular junction   there is mild increased density (602:67) which may represent a blood clot   or a nonradiopaque stone. Further evaluation with dedicated CT urogram   may be useful as clinically indicated.    BLADDER: Urinary bladder wall is diffusely thickened however this may be   due to underdistention. Correlate with urinalysis.  REPRODUCTIVE ORGANS: Prostate within normal limits.    BOWEL: No bowel obstruction. Appendix is normal.  PERITONEUM/RETROPERITONEUM: Within normal limits.  VESSELS: Within normal limits.  LYMPH NODES: No lymphadenopathy.  ABDOMINAL WALL: Within normal limits.  BONES: Mild degenerative changes of the spine.    IMPRESSION:    There is left perinephric stranding, a delayed nephrogram and, mild left   hydronephrosis and hydroureter to the level of the urinary bladder. No   definite radiopaque obstructing stone is seen however, at the left   ureterovesicular junction there is mild increased density (602:67) which   may represent a blood clot or a nonradiopaque stone. Further evaluation   with dedicated CT urogram may be useful as clinically indicated.      --- End of Report ---    KIM MOREL MD; Attending Radiologist  This document has been electronically signed. Oct 28 2024 5:51AM    < end of copied text >

## 2024-10-29 NOTE — ED CDU PROVIDER SUBSEQUENT DAY NOTE - CLINICAL SUMMARY MEDICAL DECISION MAKING FREE TEXT BOX
FERNANDO Sweet MD: 47yo M with PMH of kidney stones with recent CT several months ago showing evidence of past kidney stone presents ED for left-sided flank pain consistent with kidney stones in the past, intermittent over the past couple of days but severe onset over the past Several hours. >Patient also endorsed some hematuria.  ED course reviewed. no leukocytosis noted. CT showing "left perinephric stranding, mild left hydronephrosis and hydroureter to the level of the urinary bladder.  No definite peritoneal plaque obstructive stone is seen however, at the left ureterovesicular junction there is mild increased density which may represent a blood clot or non-radiopaque stone." patient seen by urology. sent to cdu for analgesics, IV hydration, continued monitoring.

## 2024-10-30 ENCOUNTER — TRANSCRIPTION ENCOUNTER (OUTPATIENT)
Age: 49
End: 2024-10-30

## 2024-10-30 VITALS
SYSTOLIC BLOOD PRESSURE: 137 MMHG | OXYGEN SATURATION: 97 % | HEART RATE: 57 BPM | DIASTOLIC BLOOD PRESSURE: 92 MMHG | RESPIRATION RATE: 18 BRPM | TEMPERATURE: 98 F

## 2024-10-30 DIAGNOSIS — N23 UNSPECIFIED RENAL COLIC: ICD-10-CM

## 2024-10-30 DIAGNOSIS — N12 TUBULO-INTERSTITIAL NEPHRITIS, NOT SPECIFIED AS ACUTE OR CHRONIC: ICD-10-CM

## 2024-10-30 LAB
ANION GAP SERPL CALC-SCNC: 11 MMOL/L — SIGNIFICANT CHANGE UP (ref 5–17)
BUN SERPL-MCNC: 8 MG/DL — SIGNIFICANT CHANGE UP (ref 7–23)
CALCIUM SERPL-MCNC: 9.5 MG/DL — SIGNIFICANT CHANGE UP (ref 8.4–10.5)
CHLORIDE SERPL-SCNC: 106 MMOL/L — SIGNIFICANT CHANGE UP (ref 96–108)
CO2 SERPL-SCNC: 23 MMOL/L — SIGNIFICANT CHANGE UP (ref 22–31)
CREAT SERPL-MCNC: 0.99 MG/DL — SIGNIFICANT CHANGE UP (ref 0.5–1.3)
EGFR: 92 ML/MIN/1.73M2 — SIGNIFICANT CHANGE UP
GLUCOSE SERPL-MCNC: 87 MG/DL — SIGNIFICANT CHANGE UP (ref 70–99)
POTASSIUM SERPL-MCNC: 3.9 MMOL/L — SIGNIFICANT CHANGE UP (ref 3.5–5.3)
POTASSIUM SERPL-SCNC: 3.9 MMOL/L — SIGNIFICANT CHANGE UP (ref 3.5–5.3)
SARS-COV-2 RNA SPEC QL NAA+PROBE: SIGNIFICANT CHANGE UP
SODIUM SERPL-SCNC: 140 MMOL/L — SIGNIFICANT CHANGE UP (ref 135–145)

## 2024-10-30 PROCEDURE — 88300 SURGICAL PATH GROSS: CPT | Mod: 26

## 2024-10-30 PROCEDURE — 99232 SBSQ HOSP IP/OBS MODERATE 35: CPT

## 2024-10-30 RX ORDER — ACETAMINOPHEN 500 MG
1000 TABLET ORAL ONCE
Refills: 0 | Status: COMPLETED | OUTPATIENT
Start: 2024-10-30 | End: 2024-10-30

## 2024-10-30 RX ORDER — ONDANSETRON HYDROCHLORIDE 2 MG/ML
4 INJECTION, SOLUTION INTRAMUSCULAR; INTRAVENOUS ONCE
Refills: 0 | Status: COMPLETED | OUTPATIENT
Start: 2024-10-30 | End: 2024-10-30

## 2024-10-30 RX ORDER — AMOXICILLIN AND CLAVULANATE POTASSIUM 600; 42.9 MG/5ML; MG/5ML
1 POWDER, FOR SUSPENSION ORAL
Qty: 10 | Refills: 0
Start: 2024-10-30 | End: 2024-11-03

## 2024-10-30 RX ORDER — ONDANSETRON HYDROCHLORIDE 2 MG/ML
4 INJECTION, SOLUTION INTRAMUSCULAR; INTRAVENOUS EVERY 6 HOURS
Refills: 0 | Status: DISCONTINUED | OUTPATIENT
Start: 2024-10-30 | End: 2024-10-30

## 2024-10-30 RX ORDER — AMOXICILLIN AND CLAVULANATE POTASSIUM 600; 42.9 MG/5ML; MG/5ML
1 POWDER, FOR SUSPENSION ORAL
Refills: 0 | Status: DISCONTINUED | OUTPATIENT
Start: 2024-10-30 | End: 2024-10-30

## 2024-10-30 RX ORDER — ACETAMINOPHEN 500 MG
650 TABLET ORAL ONCE
Refills: 0 | Status: COMPLETED | OUTPATIENT
Start: 2024-10-30 | End: 2024-10-30

## 2024-10-30 RX ADMIN — Medication 400 MILLIGRAM(S): at 15:52

## 2024-10-30 RX ADMIN — Medication 650 MILLIGRAM(S): at 05:24

## 2024-10-30 RX ADMIN — ONDANSETRON HYDROCHLORIDE 4 MILLIGRAM(S): 2 INJECTION, SOLUTION INTRAMUSCULAR; INTRAVENOUS at 17:41

## 2024-10-30 RX ADMIN — Medication 400 MILLIGRAM(S): at 09:16

## 2024-10-30 RX ADMIN — ONDANSETRON HYDROCHLORIDE 4 MILLIGRAM(S): 2 INJECTION, SOLUTION INTRAMUSCULAR; INTRAVENOUS at 09:49

## 2024-10-30 NOTE — DISCHARGE NOTE PROVIDER - CARE PROVIDERS DIRECT ADDRESSES
,DirectAddress_Unknown ,DirectAddress_Unknown,[email or web address]

## 2024-10-30 NOTE — DISCHARGE NOTE PROVIDER - NSDCCPCAREPLAN_GEN_ALL_CORE_FT
PRINCIPAL DISCHARGE DIAGNOSIS  Diagnosis: Left flank pain  Assessment and Plan of Treatment: You were found to have mild left hydronephrosis likely from rare radiolucent stone that you spontaneously passed on your own.   Please follow up as outpatient for Renal/Bladder Ultrasound to re-evaluate for resolution of hydronephrosis

## 2024-10-30 NOTE — DISCHARGE NOTE PROVIDER - REASON FOR ADMISSION
Subjective     Patient ID: Maldonado Hwang is a 52 y.o. male.    Chief Complaint: check up, med refill    Patient presents for annual physical exam. He reports he needs to change pain management doctors. He states he would like to see a new rheumatologist  Back Pain  This is a chronic problem. The current episode started more than 1 year ago. The problem occurs constantly. The problem is unchanged. The pain is present in the gluteal. The quality of the pain is described as burning. The pain radiates to the left knee, left thigh and right thigh. The pain is at a severity of 8/10. The pain is moderate. The pain is The same all the time. The symptoms are aggravated by bending, position, lying down, sitting, standing and twisting. Stiffness is present In the morning. Associated symptoms include abdominal pain, leg pain, numbness, paresthesias, perianal numbness, tingling and weakness. Pertinent negatives include no bladder incontinence, bowel incontinence, chest pain, dysuria, fever, headaches, paresis, pelvic pain or weight loss. Risk factors include lack of exercise, obesity and sedentary lifestyle. The treatment provided moderate relief.     Review of Systems   Constitutional:  Negative for fever and weight loss.   Cardiovascular:  Negative for chest pain.   Gastrointestinal:  Positive for abdominal pain. Negative for bowel incontinence.   Genitourinary:  Negative for bladder incontinence, dysuria, hematuria and pelvic pain.   Musculoskeletal:  Positive for back pain and leg pain.   Neurological:  Positive for tingling, weakness, numbness and paresthesias. Negative for headaches.          Objective       Current Outpatient Medications:     blood sugar diagnostic Strp, 1 strip by Misc.(Non-Drug; Combo Route) route once daily., Disp: 100 strip, Rfl: 3    blood-glucose meter kit, Use as instructed, Disp: 1 each, Rfl: 0    empagliflozin (JARDIANCE) 25 mg tablet, Take 1 tablet (25 mg total) by mouth once daily., Disp: 90  tablet, Rfl: 1    ergocalciferol, vitamin D2, (VITAMIN D2 ORAL), Take 1 capsule by mouth nightly., Disp: , Rfl:     eszopiclone (LUNESTA) 3 mg Tab, Take 1 tablet (3 mg total) by mouth every evening., Disp: 30 tablet, Rfl: 5    furosemide (LASIX) 20 MG tablet, Take 1 tablet (20 mg total) by mouth 2 (two) times daily., Disp: 60 tablet, Rfl: 11    lactulose (CHRONULAC) 20 gram/30 mL Soln, Take 15 mLs (10 g total) by mouth 3 (three) times daily. (Patient taking differently: Take 10 g by mouth 3 (three) times daily as needed.), Disp: 1200 mL, Rfl: 3    metFORMIN (GLUCOPHAGE) 500 MG tablet, Take 2 tablets (1,000 mg total) by mouth 2 (two) times daily with meals. (Patient taking differently: Take 1,000 mg by mouth daily with breakfast.), Disp: 360 tablet, Rfl: 1    multivitamin (THERAGRAN) per tablet, Take 1 tablet by mouth nightly., Disp: , Rfl:     ondansetron (ZOFRAN) 8 MG tablet, Take 1 tablet (8 mg total) by mouth 2 (two) times daily., Disp: 10 tablet, Rfl: 0    ondansetron (ZOFRAN) 8 MG tablet, Take 1 tablet (8 mg total) by mouth 2 (two) times daily., Disp: 10 tablet, Rfl: 0    oxyCODONE (ROXICODONE) 5 MG immediate release tablet, Take 5 mg by mouth 4 (four) times daily as needed., Disp: , Rfl:     primidone (MYSOLINE) 50 MG Tab, Take 1 tablet (50 mg total) by mouth 3 (three) times daily., Disp: 270 tablet, Rfl: 1    rOPINIRole (REQUIP) 0.25 MG tablet, Take 0.25 mg by mouth nightly as needed., Disp: , Rfl:     rosuvastatin (CRESTOR) 10 MG tablet, Take 5 mg by mouth once daily., Disp: , Rfl:     semaglutide (RYBELSUS) 3 mg tablet, Take 1 tablet (3 mg total) by mouth once daily. (Patient taking differently: Take 7 mg by mouth once daily.), Disp: 30 tablet, Rfl: 11    sulfamethoxazole-trimethoprim 800-160mg (BACTRIM DS) 800-160 mg Tab, Take 1 tablet by mouth 2 (two) times daily., Disp: 14 tablet, Rfl: 0    triamcinolone acetonide 0.1% (KENALOG) 0.1 % cream, Apply topically 2 (two) times daily., Disp: 45 g, Rfl: 0     XIFAXAN 550 mg Tab, TAKE 1 TABLET BY MOUTH TWICE A DAY (Patient taking differently: Take 550 mg by mouth once daily.), Disp: 60 tablet, Rfl: 2    folic acid (FOLVITE) 1 MG tablet, Take 1 tablet (1,000 mcg total) by mouth once daily. (Patient taking differently: Take 1,000 mcg by mouth nightly.), Disp: 90 tablet, Rfl: 1  No current facility-administered medications for this visit.    Facility-Administered Medications Ordered in Other Visits:     dextrose 50% injection 12.5 g, 12.5 g, Intravenous, PRN, Ramiro Best MD    dextrose 50% injection 12.5 g, 12.5 g, Intravenous, PRN, Deon Shelley MD    dextrose 50% injection 25 g, 25 g, Intravenous, PRN, Ramiro Best MD    dextrose 50% injection 25 g, 25 g, Intravenous, PRN, Deon Shelley MD    diphenhydrAMINE injection 25 mg, 25 mg, Intravenous, Q6H PRNMarek John P, MD    HYDROmorphone injection 0.5 mg, 0.5 mg, Intravenous, Q5 Min PRN, Kenny Jara MD, 0.5 mg at 08/19/23 1001    insulin regular injection 0-8 Units, 0-8 Units, Intravenous, PRN, Ramiro Best MD    insulin regular injection 0-8 Units, 0-8 Units, Intravenous, PRN, Deon Shelley MD, 2 Units at 12/26/23 1220    lactated ringers infusion, , Intravenous, Continuous, Ramiro Best MD, Stopped at 08/19/23 0905    lactated ringers infusion, , Intravenous, Continuous, Deon Shelley MD    LIDOcaine (PF) 10 mg/ml (1%) injection 10 mg, 1 mL, Intradermal, Once, Ramiro Best MD    LIDOcaine (PF) 10 mg/ml (1%) injection 10 mg, 1 mL, Intradermal, Once, Deon Shelley MD    LORazepam injection 0.25 mg, 0.25 mg, Intravenous, Once PRNMarek John P, MD    ondansetron injection 4 mg, 4 mg, Intravenous, Daily PRN, Kenny Jara MD    prochlorperazine injection Soln 5 mg, 5 mg, Intravenous, Q30 Min PRN, Kenny Jara MD    sodium chloride 0.9% bolus 250 mL 250 mL, 250 mL, Intravenous, Once, Kenny Jara MD      Physical Exam  Constitutional:       General: He is not in acute distress.     Appearance: Normal appearance. He is normal weight.   HENT:      Head: Normocephalic and atraumatic.   Cardiovascular:      Rate and Rhythm: Normal rate and regular rhythm.      Heart sounds: Normal heart sounds. No murmur heard.     No friction rub. No gallop.   Pulmonary:      Effort: Pulmonary effort is normal.      Breath sounds: Normal breath sounds. No wheezing, rhonchi or rales.   Abdominal:      General: Bowel sounds are normal. There is no distension.      Palpations: Abdomen is soft.      Tenderness: There is no abdominal tenderness. There is no rebound.   Skin:     General: Skin is warm and dry.      Coloration: Skin is not jaundiced.   Neurological:      General: No focal deficit present.      Mental Status: He is alert and oriented to person, place, and time. Mental status is at baseline.   Psychiatric:         Mood and Affect: Mood normal.         Behavior: Behavior normal.            Assessment and Plan     1. Chronic left hip pain  -     Ambulatory referral/consult to Pain Clinic; Future; Expected date: 02/01/2024    2. Pain in thoracic spine  -     Ambulatory referral/consult to Pain Clinic; Future; Expected date: 02/01/2024    3. Ankylosing spondylitis of lumbar region  -     Ambulatory referral/consult to Rheumatology; Future; Expected date: 02/01/2024    4. Type 2 diabetes mellitus with left eye affected by mild nonproliferative retinopathy without macular edema, without long-term current use of insulin  -     Hemoglobin A1C; Future; Expected date: 01/25/2024  -     TSH; Future; Expected date: 01/25/2024    5. Primary hypertension  Overview:  Chronic, Stable, cont aldactone.    Orders:  -     CBC Auto Differential; Future; Expected date: 01/25/2024  -     Comprehensive Metabolic Panel; Future; Expected date: 01/25/2024    6. Hyperlipidemia associated with type 2 diabetes mellitus  Overview:  Chronic, Stable,  continue current dose of Crestor.  Monitor in the setting of fatty liver and alcohol abuse.    Orders:  -     Lipid Panel; Future; Expected date: 01/25/2024  -     TSH; Future; Expected date: 01/25/2024    Will obtain CBC, CMP, TSH, A1c and lipid panel for regular health monitoring   Blood pressure well controlled. Continue lasix. Will obtain CBC and CMP for medication monitoring   Continue crestor. Will obtain Lipid panel for medication monitoring. Will obtain TSH to determine if a contributing factor   Will refer to pain management and rheumatology       No follow-ups on file.     abd  pain

## 2024-10-30 NOTE — PROGRESS NOTE ADULT - ASSESSMENT
Renal colic  U/A bland appearing despite concern of pyelo on CT    10/30: No further subjective fevers. No chills/rigors. Cx NTD.    Suggestions--  Brief course of antibiotics reasonable  Please recall PRN.  Thank you for the courtesy of this referral.  Greg Benjamin MD  Attending Physician  [facility]  Division of Infectious Diseases  [phone]

## 2024-10-30 NOTE — DISCHARGE NOTE PROVIDER - NSDCMRMEDTOKEN_GEN_ALL_CORE_FT
acetaminophen 325 mg oral tablet: 2 tab(s) orally every 6 hours, As needed, Temp greater or equal to 38C (100.4F), Mild Pain (1 - 3), Moderate Pain (4 - 6)  gabapentin 300 mg oral capsule: 1 cap(s) orally once a day (at bedtime), As Needed  oxyCODONE 5 mg oral tablet: 1 tab(s) orally every 6 hours, As Needed  oxyCODONE 5 mg oral tablet: 1 tab(s) orally 2 times a day MDD:2  pantoprazole 40 mg oral delayed release tablet: 1 tab(s) orally once a day, As Needed  potassium citrate 10 mEq oral tablet, extended release: 1 tab(s) orally 3 times a day  tamsulosin 0.4 mg oral capsule: 1 cap(s) orally once a day (at bedtime)   acetaminophen 325 mg oral tablet: 2 tab(s) orally every 6 hours, As needed, Temp greater or equal to 38C (100.4F), Mild Pain (1 - 3), Moderate Pain (4 - 6)  amoxicillin-clavulanate 875 mg-125 mg oral tablet: 1 tab(s) orally 2 times a day  gabapentin 300 mg oral capsule: 1 cap(s) orally once a day (at bedtime), As Needed  oxyCODONE 5 mg oral tablet: 1 tab(s) orally every 6 hours, As Needed  oxyCODONE 5 mg oral tablet: 1 tab(s) orally 2 times a day MDD:2  pantoprazole 40 mg oral delayed release tablet: 1 tab(s) orally once a day, As Needed  potassium citrate 10 mEq oral tablet, extended release: 1 tab(s) orally 3 times a day  tamsulosin 0.4 mg oral capsule: 1 cap(s) orally once a day (at bedtime)

## 2024-10-30 NOTE — PROGRESS NOTE ADULT - ASSESSMENT
50yo male       h/o chronic back pain, nephrolithiasis/ prior   foot  drop.  s/p  laminectomy     c/o    day left flank pain. Pt states pain is similar to previous stone pain. /   improved with ibuprofen 800mg , but then became severe with nausea/vomiting l     very mild dysuria and and dizziness associated with the pain.       denies any fevers/chills, hematuria./    has never required any surgical intervention for stones in the past. ,    typically very small and "sand-like".       recvd  4mg IV morphine, toradol and tylenol.         c/o   flank  pain.  was  in  [facility]        now  admitted   by  er.    with   worsening  pain         suspect    kidney  stone/  L  pyelo .  surpeisingly  u/a  is  relatively   bland        ct  , noted,  left  hydro/    pyelo/  density  at  L  UV  jnt    may  be  stone  vs  clot    AMY,  on iv  fluids       seen  by   urology/. today  sttaed,  no  need  for mri    pt  passed  stone  last  night    d/c  on  augmentin/  f/p  with  urology/  u/s  as  an  out  opt         < from: CT Abdomen and Pelvis w/ IV Cont (10.28.24 @ 06:32) >  MPRESSION:  There is left perinephric stranding, a delayed nephrogram and, mild left   hydronephrosis and hydroureter to the level of the urinary bladder. No   definite radiopaque obstructing stone is seen however, at the left   ureterovesicular junction there is mild increased density (602:67) which   may represent a blood clot or a nonradiopaque stone. Further evaluation   with dedicated CT urogram may be useful as clinically indicated.  --- End of Report ---    <

## 2024-10-30 NOTE — DISCHARGE NOTE NURSING/CASE MANAGEMENT/SOCIAL WORK - PATIENT PORTAL LINK FT
You can access the FollowMyHealth Patient Portal offered by [facility] by registering at the following website: [email or web address]. By joining [facility]’s FollowMyHealth portal, you will also be able to view your health information using other applications (apps) compatible with our system.

## 2024-10-30 NOTE — DISCHARGE NOTE PROVIDER - HOSPITAL COURSE
48yo male       h/o chronic back pain, nephrolithiasis/ prior   foot  drop.  s/p  laminectomy     c/o    day left flank pain. Pt states pain is similar to previous stone pain. /   improved with ibuprofen 800mg , but then became severe with nausea/vomiting l     very mild dysuria and and dizziness associated with the pain.       denies any fevers/chills, hematuria./    has never required any surgical intervention for stones in the past. ,    typically very small and "sand-like".       recvd  4mg IV morphine, toradol and tylenol.         c/o   flank  pain.  was  in  [facility]        now  admitted   by  er.    with   worsening  pain         suspect    kidney  stone/  L  pyelo .  surpeisingly  u/a  is  relatively   bland        ct  , noted,  left  hydro/    pyelo/  density  at  L  UV  jnt    may  be  stone  vs  clot    AMY,  on iv  fluids       seen  by   urology/. today  sttaed,  no  need  for mri    pt  passed  stone  last  night    d/c  on  augmentin/  f/p  with  urology/  u/s  as  an  out  opt

## 2024-10-30 NOTE — PROGRESS NOTE ADULT - ATTENDING COMMENTS
AMY, L hydro and flank pain.   No radioopaque stone on CT, pt passed stone at bedside - likely rare radiolucent stone  Pain resolved  F/u Am labs/Cr  Would send stone for analysis  At this time, can hold off on MRU  Would obtain renal/bladder sono to ensure resolution of hydro - can be performed as inpt or outpt  F/u with Urology as outpt - suggest stone specialist such as Dr Muhammad or Hoenig

## 2024-10-30 NOTE — DISCHARGE NOTE NURSING/CASE MANAGEMENT/SOCIAL WORK - NSDCPEFALRISK_GEN_ALL_CORE
For information on Fall & Injury Prevention, visit: [email or web address] OR  [email or web address] OR  https://www.cdc.gov/steadi/patient.html

## 2024-10-30 NOTE — DISCHARGE NOTE PROVIDER - CARE PROVIDER_API CALL
Mat Villarreal  Cardiovascular Disease  [address] [place] [id number]  Phone: [phone]  Fax: [phone]  Follow Up Time:    Mat Villarreal  Cardiovascular Disease  [address] [place] [address] [address]  [place] [address]  Phone: [phone]  Fax: [phone]  Follow Up Time:     Paulino Valderrama  Internal Medicine  [address] [place] [id number]  Phone: [phone]  Fax: [phone]  Follow Up Time: 1 week

## 2024-10-30 NOTE — PROGRESS NOTE ADULT - ASSESSMENT
48yo male with left flank pain, passed stone overnight     Recommendations:  - Can proceed with MR urogram to further characterize hydronephrosis however it is likely from stone that patient passed overnight  - Obtain RBUS to reeval for hydro   - Monitor creatinine  - Calculus analysis   - f/u urine culture- neg  - Urology to follow     D/w Dr. Satnam Mcdonald [place] [facility]  [address] [place], [place] [id number] [phone]     50yo male with left flank pain, mild L hydro.  + passed stone overnight     Recommendations:  - Would hold off on MR urogram to further characterize hydronephrosis as is likely from rare radiolucent stone that patient passed overnight  - Obtain RBUS to reeval for resolution of hydro - can perform as outpt   - Monitor creatinine, if improved - stable for dc from  perspective  - Calculus analysis   - f/u urine culture- neg  - Urology to follow     D/w Dr. Satnam Mcdonald [place] for [facility]  [address] [place] [phone]

## 2024-10-30 NOTE — PROGRESS NOTE ADULT - SUBJECTIVE AND OBJECTIVE BOX
Subjective  Patient seen and examined at bedside. Patient endorses passing the stone. No longer in pain    Objective    Vital signs  T(F): , Max: 98.2 (10-29-24 @ 14:06)  HR: 64 (10-29-24 @ 19:49)  BP: 130/92 (10-29-24 @ 19:49)  SpO2: 97% (10-29-24 @ 19:49)  Wt(kg): --    Output     OUT:    Voided (mL): 1800 mL  Total OUT: 1800 mL    Total NET: -1800 mL      Labs      10-29 @ 08:25    WBC --    / Hct --    / SCr 1.55         Culture - Urine (collected 10-28-24 @ 04:41)  Source: Clean Catch Clean Catch (Midstream)  Final Report (10-29-24 @ 16:23):    Normal Urogenital cyndi present      
  date of service: 10-30-24 @ 08:54  [facility]  REVIEW OF SYSTEMS:  CONSTITUTIONAL: No fever,  no  weight loss  ENT:  No  tinnitus,   no   vertigo  NECK: No pain or stiffness  RESPIRATORY: No cough, wheezing, chills or hemoptysis;    No Shortness of Breath  CARDIOVASCULAR: No chest pain, palpitations, dizziness  GASTROINTESTINAL: No abdominal or epigastric pain. No nausea, vomiting, or hematemesis; No diarrhea  No melena or hematochezia.  GENITOURINARY: No dysuria, frequency, hematuria, or incontinence  NEUROLOGICAL: No headaches  SKIN: No itching,  no   rash  LYMPH Nodes: No enlarged glands  ENDOCRINE: No heat or cold intolerance  MUSCULOSKELETAL: No joint pain or swelling  PSYCHIATRIC: No depression, anxiety  HEME/LYMPH: No easy bruising, or bleeding gums  ALLERGY AND IMMUNOLOGIC: No hives or eczema	    MEDICATIONS  (STANDING):  amoxicillin  875 milliGRAM(s)/clavulanate 1 Tablet(s) Oral two times a day  gabapentin 100 milliGRAM(s) Oral at bedtime  influenza   Vaccine 0.5 milliLiter(s) IntraMuscular once  senna 2 Tablet(s) Oral at bedtime  tamsulosin 0.4 milliGRAM(s) Oral at bedtime    MEDICATIONS  (PRN):  oxycodone    5 mG/acetaminophen 325 mG 1 Tablet(s) Oral every 6 hours PRN Moderate Pain (4 - 6)      Vital Signs Last 24 Hrs  T(C): 36.7 (29 Oct 2024 19:49), Max: 36.8 (29 Oct 2024 14:06)  T(F): 98.1 (29 Oct 2024 19:49), Max: 98.2 (29 Oct 2024 14:06)  HR: 64 (29 Oct 2024 19:49) (53 - 64)  BP: 130/92 (29 Oct 2024 19:49) (120/86 - 150/94)  BP(mean): --  RR: 18 (29 Oct 2024 19:49) (16 - 18)  SpO2: 97% (29 Oct 2024 19:49) (96% - 97%)    Parameters below as of 29 Oct 2024 19:49  Patient On (Oxygen Delivery Method): room air      CAPILLARY BLOOD GLUCOSE        I&O's Summary    29 Oct 2024 07:01  -  30 Oct 2024 07:00  --------------------------------------------------------  IN: 1490 mL / OUT: 1800 mL / NET: -310 mL          Appearance: Normal	  HEENT:   Normal oral mucosa, PERRL, EOMI	  Lymphatic: No lymphadenopathy  Cardiovascular: Normal S1 S2, No JVD  Respiratory: Lungs clear to auscultation	  Gastrointestinal:  Soft, Non-tender, + BS	  Skin: No rash, No ecchymoses	  Extremities:     LABS:    10-29    139  |  109[H]  |  14  ----------------------------<  96  4.5   |  21[L]  |  1.55[H]    Ca    8.3[L]      29 Oct 2024 08:25    TPro  5.9[L]  /  Alb  3.5  /  TBili  0.4  /  DBili  x   /  AST  11  /  ALT  15  /  AlkPhos  70  10-29          Urinalysis Basic - ( 29 Oct 2024 08:25 )    Color: x / Appearance: x / SG: x / pH: x  Gluc: 96 mg/dL / Ketone: x  / Bili: x / Urobili: x   Blood: x / Protein: x / Nitrite: x   Leuk Esterase: x / RBC: x / WBC x   Sq Epi: x / Non Sq Epi: x / Bacteria: x                      Consultant(s) Notes Reviewed:      Care Discussed with Consultants/Other Providers:    
[facility]  Division of Infectious Diseases  [phone]    Name: CAROLANN RAMIRES  Age: 49y  Gender: Male  MRN: [id number]    Interval History--  Notes reviewed. Feeling ok. Some HA. No fevers, chills, or rigors. States he passed a kidney stone.  Changed to Augmentin by primary team.    Past Medical History--  No pertinent past medical history    Kidney stones    Kidney stones    No significant past surgical history    H/O lumbar discectomy    S/P ORIF (open reduction internal fixation) fracture    Fixation hardware in leg        For details regarding the patient's social history, family history, and other miscellaneous elements, please refer the initial infectious diseases consultation and/or the admitting history and physical examination for this admission.    Allergies    No Known Allergies    Intolerances        Medications--  Antibiotics:  amoxicillin  875 milliGRAM(s)/clavulanate 1 Tablet(s) Oral two times a day    Immunologic:  influenza   Vaccine 0.5 milliLiter(s) IntraMuscular once    Other:  gabapentin  oxycodone    5 mG/acetaminophen 325 mG PRN  senna  tamsulosin      Review of Systems--  A 10-point review of systems was obtained.   Review of systems otherwise unchanged compared to prior visit  except as previously noted.    Physical Examination--  Vital Signs: T(F): 98.4 (10-30-24 @ 10:38), Max: 98.4 (10-30-24 @ 10:38)  HR: 60 (10-30-24 @ 10:38)  BP: 143/94 (10-30-24 @ 10:38)  RR: 18 (10-30-24 @ 10:38)  SpO2: 98% (10-30-24 @ 10:38)  Wt(kg): --  General: Nontoxic-appearing Male in no acute distress.  HEENT: AT/NC. Anicteric. Conjunctiva pink and moist. Oropharynx clear.  Neck: Not rigid. No sense of mass.  Nodes: None palpable.  Lungs: Clear bilaterally without rales, wheezing or rhonchi  Heart: Regular rate and rhythm.   Abdomen: Bowel sounds present and normoactive. Soft. Nondistended. Nontender. Obese  Extremities: No cyanosis or clubbing. No edema.   Skin: Warm. Dry. Good turgor. No rash. No vasculitic stigmata.  Psychiatric: Appropriate affect and mood for situation.         Laboratory Studies--  CBC      Chemistries  10-29    139  |  109[H]  |  14  ----------------------------<  96  4.5   |  21[L]  |  1.55[H]    Ca    8.3[L]      29 Oct 2024 08:25    TPro  5.9[L]  /  Alb  3.5  /  TBili  0.4  /  DBili  x   /  AST  11  /  ALT  15  /  AlkPhos  70  10-29      Culture Data    Culture - Urine (collected 28 Oct 2024 04:41)  Source: Clean Catch Clean Catch (Midstream)  Final Report (29 Oct 2024 16:23):    Normal Urogenital cyndi present

## 2024-10-30 NOTE — DISCHARGE NOTE PROVIDER - PROVIDER TOKENS
PROVIDER:[TOKEN:[[id number]]] PROVIDER:[TOKEN:[[id number]]],PROVIDER:[TOKEN:[[id number]],FOLLOWUP:[1 week]]

## 2024-10-30 NOTE — DISCHARGE NOTE PROVIDER - NSDCFUADDAPPT_GEN_ALL_CORE_FT
APPTS ARE READY TO BE MADE: [x] YES    Best Family or Patient Contact (if needed):    Additional Information about above appointments (if needed):    1:   2:   3:     Other comments or requests:    APPTS ARE READY TO BE MADE: [x] YES    Best Family or Patient Contact (if needed):    Additional Information about above appointments (if needed):    1: Dr. Villarreal  2: Dr. Valderrama  3:     Other comments or requests:    APPTS ARE READY TO BE MADE: [x] YES    Best Family or Patient Contact (if needed):    Additional Information about above appointments (if needed):    1: Dr. Villarreal  2: Dr. Valderrama  3:     Other comments or requests:     Cardiovascular Disease:  Met with patient face to face and provided the patient with provider referral information, however patient prefers to schedule the appointments on their own.    Internal Medicine:  Patient informed us they already have secured a follow up appointment which was not scheduled by our team.

## 2024-10-30 NOTE — DISCHARGE NOTE NURSING/CASE MANAGEMENT/SOCIAL WORK - FINANCIAL ASSISTANCE
[facility] provides services at a reduced cost to those who are determined to be eligible through [facility]’s financial assistance program. Information regarding [facility]’s financial assistance program can be found by going to [email or web address] or by calling [phone].

## 2024-10-30 NOTE — DISCHARGE NOTE NURSING/CASE MANAGEMENT/SOCIAL WORK - NSDCFUADDAPPT_GEN_ALL_CORE_FT
APPTS ARE READY TO BE MADE: [x] YES    Best Family or Patient Contact (if needed):    Additional Information about above appointments (if needed):    1: Dr. Villarreal  2: Dr. Valderrama  3:     Other comments or requests:     Cardiovascular Disease:  Met with patient face to face and provided the patient with provider referral information, however patient prefers to schedule the appointments on their own.    Internal Medicine:  Patient informed us they already have secured a follow up appointment which was not scheduled by our team.

## 2024-10-31 ENCOUNTER — TRANSCRIPTION ENCOUNTER (OUTPATIENT)
Age: 49
End: 2024-10-31

## 2024-10-31 LAB
CULTURE RESULTS: SIGNIFICANT CHANGE UP
SPECIMEN SOURCE: SIGNIFICANT CHANGE UP

## 2024-11-01 LAB — SURGICAL PATHOLOGY STUDY: SIGNIFICANT CHANGE UP

## 2024-11-05 ENCOUNTER — TRANSCRIPTION ENCOUNTER (OUTPATIENT)
Age: 49
End: 2024-11-05

## 2024-11-07 LAB
CELL MATERIAL STONE EST-MCNT: SIGNIFICANT CHANGE UP
LABORATORY COMMENT REPORT: SIGNIFICANT CHANGE UP
NIDUS STONE QN: SIGNIFICANT CHANGE UP

## 2024-11-26 PROCEDURE — 87077 CULTURE AEROBIC IDENTIFY: CPT

## 2024-11-26 PROCEDURE — 82365 CALCULUS SPECTROSCOPY: CPT

## 2024-11-26 PROCEDURE — 80053 COMPREHEN METABOLIC PANEL: CPT

## 2024-11-26 PROCEDURE — 96375 TX/PRO/DX INJ NEW DRUG ADDON: CPT

## 2024-11-26 PROCEDURE — 83605 ASSAY OF LACTIC ACID: CPT

## 2024-11-26 PROCEDURE — 80048 BASIC METABOLIC PNL TOTAL CA: CPT

## 2024-11-26 PROCEDURE — 85018 HEMOGLOBIN: CPT

## 2024-11-26 PROCEDURE — 84132 ASSAY OF SERUM POTASSIUM: CPT

## 2024-11-26 PROCEDURE — 82330 ASSAY OF CALCIUM: CPT

## 2024-11-26 PROCEDURE — 74177 CT ABD & PELVIS W/CONTRAST: CPT | Mod: MC

## 2024-11-26 PROCEDURE — 96374 THER/PROPH/DIAG INJ IV PUSH: CPT

## 2024-11-26 PROCEDURE — 82803 BLOOD GASES ANY COMBINATION: CPT

## 2024-11-26 PROCEDURE — 87086 URINE CULTURE/COLONY COUNT: CPT

## 2024-11-26 PROCEDURE — 81001 URINALYSIS AUTO W/SCOPE: CPT

## 2024-11-26 PROCEDURE — 71046 X-RAY EXAM CHEST 2 VIEWS: CPT

## 2024-11-26 PROCEDURE — 99285 EMERGENCY DEPT VISIT HI MDM: CPT | Mod: 25

## 2024-11-26 PROCEDURE — 85025 COMPLETE CBC W/AUTO DIFF WBC: CPT

## 2024-11-26 PROCEDURE — 82947 ASSAY GLUCOSE BLOOD QUANT: CPT

## 2024-11-26 PROCEDURE — 83690 ASSAY OF LIPASE: CPT

## 2024-11-26 PROCEDURE — 76770 US EXAM ABDO BACK WALL COMP: CPT

## 2024-11-26 PROCEDURE — 76937 US GUIDE VASCULAR ACCESS: CPT

## 2024-11-26 PROCEDURE — G0378: CPT

## 2024-11-26 PROCEDURE — 88300 SURGICAL PATH GROSS: CPT

## 2024-11-26 PROCEDURE — 85014 HEMATOCRIT: CPT

## 2024-11-26 PROCEDURE — 82435 ASSAY OF BLOOD CHLORIDE: CPT

## 2024-11-26 PROCEDURE — 84295 ASSAY OF SERUM SODIUM: CPT

## 2024-11-26 PROCEDURE — 87635 SARS-COV-2 COVID-19 AMP PRB: CPT

## 2025-06-11 NOTE — ED ADULT NURSE NOTE - CAS EDN DISCHARGE INTERVENTIONS
Medication passed protocol.     Medication: pioglitazone passed protocol.   Last office visit date: 3/13/25  Next appointment scheduled?: Yes     Patient saw Dr. Beach yesterday regarding diabetes. Does Dr. Beach want to take of pioglitazone? If so, please change prescribing provider name.    IV discontinued, cath removed intact

## 2025-07-25 NOTE — ED ADULT NURSE NOTE - CAS TRG GEN SKIN COLOR
Spoke with pt to verify date and arrival time of procedure. July 29, 2025 for 0945. Pt is not sure and will call back within the hour.   Normal for race